# Patient Record
Sex: MALE | Race: WHITE | NOT HISPANIC OR LATINO
[De-identification: names, ages, dates, MRNs, and addresses within clinical notes are randomized per-mention and may not be internally consistent; named-entity substitution may affect disease eponyms.]

---

## 2017-01-13 ENCOUNTER — APPOINTMENT (OUTPATIENT)
Dept: INTERNAL MEDICINE | Facility: CLINIC | Age: 82
End: 2017-01-13

## 2017-03-07 ENCOUNTER — APPOINTMENT (OUTPATIENT)
Dept: INTERNAL MEDICINE | Facility: CLINIC | Age: 82
End: 2017-03-07

## 2017-04-12 ENCOUNTER — APPOINTMENT (OUTPATIENT)
Dept: DERMATOLOGY | Facility: CLINIC | Age: 82
End: 2017-04-12

## 2017-04-14 ENCOUNTER — APPOINTMENT (OUTPATIENT)
Dept: INTERNAL MEDICINE | Facility: CLINIC | Age: 82
End: 2017-04-14

## 2017-07-03 ENCOUNTER — APPOINTMENT (OUTPATIENT)
Dept: DERMATOLOGY | Facility: CLINIC | Age: 82
End: 2017-07-03

## 2017-07-12 ENCOUNTER — APPOINTMENT (OUTPATIENT)
Dept: INTERNAL MEDICINE | Facility: CLINIC | Age: 82
End: 2017-07-12

## 2017-09-15 ENCOUNTER — APPOINTMENT (OUTPATIENT)
Dept: DERMATOLOGY | Facility: CLINIC | Age: 82
End: 2017-09-15
Payer: MEDICARE

## 2017-09-15 PROCEDURE — 99213 OFFICE O/P EST LOW 20 MIN: CPT

## 2017-10-02 ENCOUNTER — APPOINTMENT (OUTPATIENT)
Dept: INTERNAL MEDICINE | Facility: CLINIC | Age: 82
End: 2017-10-02
Payer: MEDICARE

## 2017-10-02 PROCEDURE — 90686 IIV4 VACC NO PRSV 0.5 ML IM: CPT

## 2017-10-02 PROCEDURE — G0008: CPT

## 2017-10-02 PROCEDURE — 99214 OFFICE O/P EST MOD 30 MIN: CPT | Mod: 25

## 2018-01-02 ENCOUNTER — APPOINTMENT (OUTPATIENT)
Dept: INTERNAL MEDICINE | Facility: CLINIC | Age: 83
End: 2018-01-02
Payer: MEDICARE

## 2018-01-02 PROCEDURE — 99214 OFFICE O/P EST MOD 30 MIN: CPT

## 2018-03-19 ENCOUNTER — APPOINTMENT (OUTPATIENT)
Dept: DERMATOLOGY | Facility: CLINIC | Age: 83
End: 2018-03-19
Payer: MEDICARE

## 2018-03-19 PROCEDURE — 99214 OFFICE O/P EST MOD 30 MIN: CPT

## 2018-04-13 ENCOUNTER — APPOINTMENT (OUTPATIENT)
Dept: DERMATOLOGY | Facility: CLINIC | Age: 83
End: 2018-04-13
Payer: MEDICARE

## 2018-04-13 PROCEDURE — 99212 OFFICE O/P EST SF 10 MIN: CPT

## 2018-05-02 ENCOUNTER — APPOINTMENT (OUTPATIENT)
Dept: INTERNAL MEDICINE | Facility: CLINIC | Age: 83
End: 2018-05-02
Payer: MEDICARE

## 2018-05-02 PROCEDURE — 99214 OFFICE O/P EST MOD 30 MIN: CPT

## 2018-06-12 ENCOUNTER — APPOINTMENT (OUTPATIENT)
Dept: INTERNAL MEDICINE | Facility: CLINIC | Age: 83
End: 2018-06-12
Payer: MEDICARE

## 2018-06-12 PROCEDURE — 99213 OFFICE O/P EST LOW 20 MIN: CPT

## 2018-10-03 ENCOUNTER — APPOINTMENT (OUTPATIENT)
Dept: DERMATOLOGY | Facility: CLINIC | Age: 83
End: 2018-10-03
Payer: MEDICARE

## 2018-10-03 PROCEDURE — 99213 OFFICE O/P EST LOW 20 MIN: CPT

## 2018-10-05 ENCOUNTER — OTHER (OUTPATIENT)
Age: 83
End: 2018-10-05

## 2018-10-17 ENCOUNTER — APPOINTMENT (OUTPATIENT)
Dept: INTERNAL MEDICINE | Facility: CLINIC | Age: 83
End: 2018-10-17
Payer: MEDICARE

## 2018-10-17 VITALS
DIASTOLIC BLOOD PRESSURE: 70 MMHG | HEIGHT: 66 IN | SYSTOLIC BLOOD PRESSURE: 140 MMHG | BODY MASS INDEX: 27.64 KG/M2 | WEIGHT: 172 LBS

## 2018-10-17 PROCEDURE — 99215 OFFICE O/P EST HI 40 MIN: CPT

## 2018-10-17 NOTE — PHYSICAL EXAM

## 2018-10-17 NOTE — HISTORY OF PRESENT ILLNESS
[FreeTextEntry1] : follow up on results [de-identified] : PT HERE WITH SON AND WIFE HAS RECENT ELEVATION IN PSA SEEN BY DR SOMMERS AND MRI WAS SOEN WITH CON[facility]EREN FOR MALIGNANCY \par HE HAS SEEN UROLOGY  HERE FOR FOLLOW UP

## 2018-10-17 NOTE — PLAN
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

## 2018-10-23 ENCOUNTER — APPOINTMENT (OUTPATIENT)
Dept: UROLOGY | Facility: CLINIC | Age: 83
End: 2018-10-23
Payer: MEDICARE

## 2018-10-23 DIAGNOSIS — Z85.46 PERSONAL HISTORY OF MALIGNANT NEOPLASM OF PROSTATE: ICD-10-CM

## 2018-10-23 PROCEDURE — 99204 OFFICE O/P NEW MOD 45 MIN: CPT

## 2018-10-24 PROBLEM — Z85.46 HISTORY OF MALIGNANT NEOPLASM OF PROSTATE: Status: RESOLVED | Noted: 2018-10-17 | Resolved: 2018-10-24

## 2019-01-11 ENCOUNTER — APPOINTMENT (OUTPATIENT)
Dept: INTERNAL MEDICINE | Facility: CLINIC | Age: 84
End: 2019-01-11
Payer: MEDICARE

## 2019-01-11 VITALS
WEIGHT: 166 LBS | BODY MASS INDEX: 26.68 KG/M2 | HEIGHT: 66 IN | SYSTOLIC BLOOD PRESSURE: 158 MMHG | HEART RATE: 59 BPM | DIASTOLIC BLOOD PRESSURE: 74 MMHG

## 2019-01-11 VITALS — SYSTOLIC BLOOD PRESSURE: 120 MMHG | DIASTOLIC BLOOD PRESSURE: 70 MMHG

## 2019-01-11 PROCEDURE — 99214 OFFICE O/P EST MOD 30 MIN: CPT

## 2019-01-11 NOTE — HISTORY OF PRESENT ILLNESS
[FreeTextEntry1] : follow up meds [de-identified] : PT HERE FOR FOLLOW UP  FEELS OK \par HAS SOME DYSPNEA ON EXERTION AT TIMES NO CHEST PAIN\par PT SAW DR KIP KULKARNI FOR SECOND OPINION ON ELEVATED PSA\par

## 2019-01-11 NOTE — PLAN
[FreeTextEntry1] : PT HERE FOR FOLLOW UP  FEELS OK \par HAS SOME DYSPNEA ON EXERTION AT TIMES NO CHEST PAIN\par WILL REFER OT Cardiology HE HAD SEEN DR HESS YEARS AOG\par IT MAY BE DECONDITIONING BUT WILL HAVE THEM EVALUATE \par PT SAW DR KIP KULKARNI FOR SECOND OPINION ON ELEVATED PSA\par NO INDICATION FOR FURTHER WORK UP \par WILL FOLLOW UP\par D/W PT AND SON IN ROOM

## 2019-04-05 ENCOUNTER — APPOINTMENT (OUTPATIENT)
Dept: DERMATOLOGY | Facility: CLINIC | Age: 84
End: 2019-04-05
Payer: MEDICARE

## 2019-04-05 PROCEDURE — 99214 OFFICE O/P EST MOD 30 MIN: CPT

## 2019-04-22 ENCOUNTER — APPOINTMENT (OUTPATIENT)
Dept: INTERNAL MEDICINE | Facility: CLINIC | Age: 84
End: 2019-04-22
Payer: MEDICARE

## 2019-04-22 VITALS
BODY MASS INDEX: 26.52 KG/M2 | WEIGHT: 165 LBS | HEIGHT: 66 IN | DIASTOLIC BLOOD PRESSURE: 75 MMHG | SYSTOLIC BLOOD PRESSURE: 130 MMHG

## 2019-04-22 DIAGNOSIS — L29.9 PRURITUS, UNSPECIFIED: ICD-10-CM

## 2019-04-22 PROCEDURE — 99215 OFFICE O/P EST HI 40 MIN: CPT

## 2019-04-22 NOTE — PHYSICAL EXAM

## 2019-04-22 NOTE — HISTORY OF PRESENT ILLNESS
[FreeTextEntry1] : F/U  on B/P & Other Medical Problems [de-identified] : PT HERE FOR FOLLOW UP  FEELS OK \par HAS SOME DYSPNEA ON EXERTION AT TIMES NO CHEST PAIN\par PT SEES DR SOMMERS FOR CRI OVERALL FEELS OK\par \par

## 2019-04-22 NOTE — PLAN
[FreeTextEntry1] : PT HERE FOR FOLLOW UP  FEELS OK \par HAS SOME DYSPNEA ON EXERTION AT TIMES NO CHEST PAIN\par SAW  Cardiology   DR HESS  STRESS TEST OK\par  P T WITH DJD  DIFFICULTY WALKING LONG DISTANCES \par SON IN ROOM WITH PT\par GAVE  RX TO CHECK LIPID LEVEL

## 2019-08-29 ENCOUNTER — APPOINTMENT (OUTPATIENT)
Dept: INTERNAL MEDICINE | Facility: CLINIC | Age: 84
End: 2019-08-29
Payer: MEDICARE

## 2019-08-29 VITALS
DIASTOLIC BLOOD PRESSURE: 70 MMHG | SYSTOLIC BLOOD PRESSURE: 130 MMHG | WEIGHT: 167 LBS | HEIGHT: 68 IN | BODY MASS INDEX: 25.31 KG/M2

## 2019-08-29 DIAGNOSIS — M79.605 PAIN IN LEFT LEG: ICD-10-CM

## 2019-08-29 PROCEDURE — 99214 OFFICE O/P EST MOD 30 MIN: CPT

## 2019-08-29 NOTE — PLAN
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

## 2019-08-29 NOTE — PHYSICAL EXAM
[No Acute Distress] : no acute distress [Well Nourished] : well nourished [Well Developed] : well developed [Well-Appearing] : well-appearing [Normal Sclera/Conjunctiva] : normal sclera/conjunctiva [PERRL] : pupils equal round and reactive to light [EOMI] : extraocular movements intact [Normal Outer Ear/Nose] : the outer ears and nose were normal in appearance [Normal Oropharynx] : the oropharynx was normal [No JVD] : no jugular venous distention [No Lymphadenopathy] : no lymphadenopathy [Supple] : supple [Thyroid Normal, No Nodules] : the thyroid was normal and there were no nodules present [No Respiratory Distress] : no respiratory distress  [Clear to Auscultation] : lungs were clear to auscultation bilaterally [No Accessory Muscle Use] : no accessory muscle use [Regular Rhythm] : with a regular rhythm [Normal Rate] : normal rate  [No Murmur] : no murmur heard [Normal S1, S2] : normal S1 and S2 [No Carotid Bruits] : no carotid bruits [No Abdominal Bruit] : a ~M bruit was not heard ~T in the abdomen [No Varicosities] : no varicosities [Pedal Pulses Present] : the pedal pulses are present [No Palpable Aorta] : no palpable aorta [No Edema] : there was no peripheral edema [No Extremity Clubbing/Cyanosis] : no extremity clubbing/cyanosis [Soft] : abdomen soft [Non-distended] : non-distended [Non Tender] : non-tender [No HSM] : no HSM [No Masses] : no abdominal mass palpated [Normal Bowel Sounds] : normal bowel sounds [Normal Posterior Cervical Nodes] : no posterior cervical lymphadenopathy [Normal Anterior Cervical Nodes] : no anterior cervical lymphadenopathy [No CVA Tenderness] : no CVA  tenderness [No Spinal Tenderness] : no spinal tenderness [No Joint Swelling] : no joint swelling [Grossly Normal Strength/Tone] : grossly normal strength/tone [No Rash] : no rash [Coordination Grossly Intact] : coordination grossly intact [No Focal Deficits] : no focal deficits [Normal Gait] : normal gait [Deep Tendon Reflexes (DTR)] : deep tendon reflexes were 2+ and symmetric [Normal Affect] : the affect was normal [Normal Insight/Judgement] : insight and judgment were intact

## 2019-08-29 NOTE — HISTORY OF PRESENT ILLNESS
[FreeTextEntry1] : Follow Up On Medical Problems [de-identified] : PT HERE FOR FOLLOW UP  FEELS OK \par HAS SOME DYSPNEA ON EXERTION AT TIMES NO CHEST PAIN\par PT SEES DR SOMMERS FOR CRI OVERALL FEELS OK\par LABS Done RECENTLY\par \par

## 2019-10-25 ENCOUNTER — APPOINTMENT (OUTPATIENT)
Dept: DERMATOLOGY | Facility: CLINIC | Age: 84
End: 2019-10-25
Payer: MEDICARE

## 2019-10-25 PROCEDURE — 17000 DESTRUCT PREMALG LESION: CPT

## 2019-10-25 PROCEDURE — 99214 OFFICE O/P EST MOD 30 MIN: CPT | Mod: 25

## 2019-11-15 ENCOUNTER — OTHER (OUTPATIENT)
Age: 84
End: 2019-11-15

## 2020-02-20 ENCOUNTER — APPOINTMENT (OUTPATIENT)
Dept: INTERNAL MEDICINE | Facility: CLINIC | Age: 85
End: 2020-02-20
Payer: MEDICARE

## 2020-02-20 VITALS
SYSTOLIC BLOOD PRESSURE: 130 MMHG | WEIGHT: 168.5 LBS | DIASTOLIC BLOOD PRESSURE: 80 MMHG | BODY MASS INDEX: 25.54 KG/M2 | HEIGHT: 68 IN

## 2020-02-20 PROCEDURE — 99214 OFFICE O/P EST MOD 30 MIN: CPT

## 2020-02-20 NOTE — PHYSICAL EXAM
[No Acute Distress] : no acute distress [Well Nourished] : well nourished [Well Developed] : well developed [Well-Appearing] : well-appearing [Normal Sclera/Conjunctiva] : normal sclera/conjunctiva [EOMI] : extraocular movements intact [PERRL] : pupils equal round and reactive to light [Normal Outer Ear/Nose] : the outer ears and nose were normal in appearance [Normal Oropharynx] : the oropharynx was normal [No JVD] : no jugular venous distention [No Lymphadenopathy] : no lymphadenopathy [Supple] : supple [Thyroid Normal, No Nodules] : the thyroid was normal and there were no nodules present [No Accessory Muscle Use] : no accessory muscle use [No Respiratory Distress] : no respiratory distress  [Clear to Auscultation] : lungs were clear to auscultation bilaterally [Normal Rate] : normal rate  [Regular Rhythm] : with a regular rhythm [Normal S1, S2] : normal S1 and S2 [No Murmur] : no murmur heard [No Carotid Bruits] : no carotid bruits [No Abdominal Bruit] : a ~M bruit was not heard ~T in the abdomen [No Varicosities] : no varicosities [Pedal Pulses Present] : the pedal pulses are present [No Edema] : there was no peripheral edema [No Palpable Aorta] : no palpable aorta [No Extremity Clubbing/Cyanosis] : no extremity clubbing/cyanosis [Soft] : abdomen soft [Non Tender] : non-tender [Non-distended] : non-distended [No Masses] : no abdominal mass palpated [Normal Bowel Sounds] : normal bowel sounds [No HSM] : no HSM [Normal Posterior Cervical Nodes] : no posterior cervical lymphadenopathy [Normal Anterior Cervical Nodes] : no anterior cervical lymphadenopathy [No CVA Tenderness] : no CVA  tenderness [No Spinal Tenderness] : no spinal tenderness [No Joint Swelling] : no joint swelling [Grossly Normal Strength/Tone] : grossly normal strength/tone [No Rash] : no rash [Coordination Grossly Intact] : coordination grossly intact [No Focal Deficits] : no focal deficits [Normal Gait] : normal gait [Deep Tendon Reflexes (DTR)] : deep tendon reflexes were 2+ and symmetric [Normal Affect] : the affect was normal [Normal Insight/Judgement] : insight and judgment were intact

## 2020-02-20 NOTE — HISTORY OF PRESENT ILLNESS
[FreeTextEntry1] : Follow Up on B/P  Chol  & Other  Medical Problems [de-identified] : PT HERE FOR FOLLOW UP  FEELS OK \par NO CP OR SOB\par PT SEES DR SOMMERS FOR CRI OVERALL FEELS OK\par LABS Done IN NOVMEBER\par \par

## 2020-02-20 NOTE — PLAN
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

## 2020-04-16 ENCOUNTER — TRANSCRIPTION ENCOUNTER (OUTPATIENT)
Age: 85
End: 2020-04-16

## 2020-08-10 ENCOUNTER — RX RENEWAL (OUTPATIENT)
Age: 85
End: 2020-08-10

## 2020-08-13 ENCOUNTER — APPOINTMENT (OUTPATIENT)
Dept: INTERNAL MEDICINE | Facility: CLINIC | Age: 85
End: 2020-08-13
Payer: MEDICARE

## 2020-08-13 VITALS
TEMPERATURE: 98.1 F | SYSTOLIC BLOOD PRESSURE: 150 MMHG | BODY MASS INDEX: 25.46 KG/M2 | HEIGHT: 68 IN | WEIGHT: 168 LBS | DIASTOLIC BLOOD PRESSURE: 70 MMHG

## 2020-08-13 VITALS — SYSTOLIC BLOOD PRESSURE: 138 MMHG | DIASTOLIC BLOOD PRESSURE: 80 MMHG

## 2020-08-13 DIAGNOSIS — L30.9 DERMATITIS, UNSPECIFIED: ICD-10-CM

## 2020-08-13 DIAGNOSIS — R97.20 ELEVATED PROSTATE, SPECIFIC ANTIGEN [PSA]: ICD-10-CM

## 2020-08-13 PROCEDURE — 99214 OFFICE O/P EST MOD 30 MIN: CPT

## 2020-08-13 NOTE — PLAN
[FreeTextEntry1] : PT HERE FOR FOLLOW UP  FEELS OK \par   P T WITH DJD  DIFFICULTY WALKING LONG DISTANCES  SCIATIC ISSUES \par  \par LABS DONE  BY RENAL  \par  NOW ON FLOMAX DAILY AN DNOTES SOME IMPROVEMENT\par  \par HAD LIPID AND A1C BEFORE REVIEWED WITH POT\par OVERALL STABLE \par HAS BEEN CAREFUL DURING PANDEMIC\par OVERALL STABLE

## 2020-08-13 NOTE — HISTORY OF PRESENT ILLNESS
[FreeTextEntry1] : check up [de-identified] : PT HERE FOR FOLLOW UP  FEELS OK \par NO CP OR SOB\par PT SEES DR SOMMERS FOR CRI OVERALL FEELS OK\par LABS Done IN FEB\par \par

## 2020-08-21 ENCOUNTER — APPOINTMENT (OUTPATIENT)
Dept: DERMATOLOGY | Facility: CLINIC | Age: 85
End: 2020-08-21
Payer: MEDICARE

## 2020-08-21 PROCEDURE — 99214 OFFICE O/P EST MOD 30 MIN: CPT

## 2020-09-18 DIAGNOSIS — Z23 ENCOUNTER FOR IMMUNIZATION: ICD-10-CM

## 2020-09-21 ENCOUNTER — APPOINTMENT (OUTPATIENT)
Dept: INTERNAL MEDICINE | Facility: CLINIC | Age: 85
End: 2020-09-21

## 2020-09-22 ENCOUNTER — APPOINTMENT (OUTPATIENT)
Dept: DERMATOLOGY | Facility: CLINIC | Age: 85
End: 2020-09-22
Payer: MEDICARE

## 2020-09-22 PROCEDURE — 99213 OFFICE O/P EST LOW 20 MIN: CPT

## 2020-11-19 ENCOUNTER — APPOINTMENT (OUTPATIENT)
Dept: INTERNAL MEDICINE | Facility: CLINIC | Age: 85
End: 2020-11-19
Payer: MEDICARE

## 2020-11-19 VITALS
HEART RATE: 80 BPM | BODY MASS INDEX: 25.46 KG/M2 | TEMPERATURE: 97.4 F | WEIGHT: 168 LBS | HEIGHT: 68 IN | DIASTOLIC BLOOD PRESSURE: 76 MMHG | SYSTOLIC BLOOD PRESSURE: 153 MMHG | RESPIRATION RATE: 16 BRPM

## 2020-11-19 DIAGNOSIS — Z00.00 ENCOUNTER FOR GENERAL ADULT MEDICAL EXAMINATION W/OUT ABNORMAL FINDINGS: ICD-10-CM

## 2020-11-19 PROCEDURE — G0009: CPT

## 2020-11-19 PROCEDURE — 90732 PPSV23 VACC 2 YRS+ SUBQ/IM: CPT

## 2020-11-19 PROCEDURE — 36415 COLL VENOUS BLD VENIPUNCTURE: CPT

## 2020-11-19 PROCEDURE — 99214 OFFICE O/P EST MOD 30 MIN: CPT | Mod: 25

## 2020-11-19 NOTE — HISTORY OF PRESENT ILLNESS
[de-identified] : PT HERE FOR FOLLOW UP  FEELS OK \par NO CP OR SOB\par PT SEES DR SOMMERS FOR CRI OVERALL FEELS OK\par L \par \par

## 2020-11-19 NOTE — PLAN
[FreeTextEntry1] : PT HERE FOR FOLLOW UP  FEELS OK \par   P T WITH DJD  DIFFICULTY WALKING LONG DISTANCES  SCIATIC ISSUES \par  \par LABS DONE TODAY\par   BP IS ELEVATED WILL START LOW DOSE OF NORVASC 2.5MG AND IWLL FOLLOW UP HERE IN A MONTH FOR BP CHECK\par PNEUMOVAX 23 GIVEN

## 2020-11-22 LAB
ALBUMIN SERPL ELPH-MCNC: 4.5 G/DL
ALP BLD-CCNC: 64 U/L
ALT SERPL-CCNC: 26 U/L
ANION GAP SERPL CALC-SCNC: 14 MMOL/L
AST SERPL-CCNC: 22 U/L
BILIRUB SERPL-MCNC: 0.7 MG/DL
BUN SERPL-MCNC: 35 MG/DL
CALCIUM SERPL-MCNC: 10.2 MG/DL
CHLORIDE SERPL-SCNC: 106 MMOL/L
CHOLEST SERPL-MCNC: 158 MG/DL
CO2 SERPL-SCNC: 20 MMOL/L
CREAT SERPL-MCNC: 3.67 MG/DL
ESTIMATED AVERAGE GLUCOSE: 108 MG/DL
GLUCOSE SERPL-MCNC: 78 MG/DL
HBA1C MFR BLD HPLC: 5.4 %
HDLC SERPL-MCNC: 55 MG/DL
LDLC SERPL CALC-MCNC: 57 MG/DL
NONHDLC SERPL-MCNC: 104 MG/DL
POTASSIUM SERPL-SCNC: 4.1 MMOL/L
PROT SERPL-MCNC: 6.6 G/DL
SODIUM SERPL-SCNC: 140 MMOL/L
T4 SERPL-MCNC: 6.2 UG/DL
TRIGL SERPL-MCNC: 231 MG/DL
TSH SERPL-ACNC: 2.87 UIU/ML

## 2020-11-23 ENCOUNTER — NON-APPOINTMENT (OUTPATIENT)
Age: 85
End: 2020-11-23

## 2020-12-18 ENCOUNTER — APPOINTMENT (OUTPATIENT)
Dept: INTERNAL MEDICINE | Facility: CLINIC | Age: 85
End: 2020-12-18
Payer: MEDICARE

## 2020-12-18 VITALS
BODY MASS INDEX: 25.76 KG/M2 | HEIGHT: 68 IN | TEMPERATURE: 97.5 F | WEIGHT: 170 LBS | DIASTOLIC BLOOD PRESSURE: 75 MMHG | SYSTOLIC BLOOD PRESSURE: 130 MMHG

## 2020-12-18 PROCEDURE — 99214 OFFICE O/P EST MOD 30 MIN: CPT

## 2020-12-18 NOTE — HISTORY OF PRESENT ILLNESS
[Family Member] : family member [FreeTextEntry1] : FOLLOW UP ON BP [de-identified] : PT HERE FOR FOLLOW UP  FEELS OK \par NO CP OR SOB\par PT SEES DR SOMMERS FOR CRI OVERALL FEELS OK\par NORVASC ADDED TO BP LAST VISIT\par \par

## 2020-12-18 NOTE — HISTORY OF PRESENT ILLNESS
[Family Member] : family member [FreeTextEntry1] : FOLLOW UP ON BP [de-identified] : PT HERE FOR FOLLOW UP  FEELS OK \par NO CP OR SOB\par PT SEES DR SOMMERS FOR CRI OVERALL FEELS OK\par NORVASC ADDED TO BP LAST VISIT\par \par

## 2020-12-18 NOTE — PLAN
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

## 2021-01-13 ENCOUNTER — APPOINTMENT (OUTPATIENT)
Dept: INTERNAL MEDICINE | Facility: CLINIC | Age: 86
End: 2021-01-13
Payer: MEDICARE

## 2021-01-13 VITALS
WEIGHT: 170 LBS | TEMPERATURE: 97.6 F | DIASTOLIC BLOOD PRESSURE: 80 MMHG | BODY MASS INDEX: 25.76 KG/M2 | HEIGHT: 68 IN | SYSTOLIC BLOOD PRESSURE: 132 MMHG

## 2021-01-13 DIAGNOSIS — R60.9 EDEMA, UNSPECIFIED: ICD-10-CM

## 2021-01-13 PROCEDURE — 99214 OFFICE O/P EST MOD 30 MIN: CPT

## 2021-01-13 NOTE — PHYSICAL EXAM
[No Acute Distress] : no acute distress [Well Nourished] : well nourished [Well Developed] : well developed [Well-Appearing] : well-appearing [Normal Sclera/Conjunctiva] : normal sclera/conjunctiva [PERRL] : pupils equal round and reactive to light [EOMI] : extraocular movements intact [Normal Outer Ear/Nose] : the outer ears and nose were normal in appearance [Normal Oropharynx] : the oropharynx was normal [No JVD] : no jugular venous distention [No Lymphadenopathy] : no lymphadenopathy [Supple] : supple [Thyroid Normal, No Nodules] : the thyroid was normal and there were no nodules present [No Respiratory Distress] : no respiratory distress  [No Accessory Muscle Use] : no accessory muscle use [Clear to Auscultation] : lungs were clear to auscultation bilaterally [Normal Rate] : normal rate  [Regular Rhythm] : with a regular rhythm [Normal S1, S2] : normal S1 and S2 [No Murmur] : no murmur heard [No Carotid Bruits] : no carotid bruits [No Abdominal Bruit] : a ~M bruit was not heard ~T in the abdomen [No Varicosities] : no varicosities [Pedal Pulses Present] : the pedal pulses are present [No Palpable Aorta] : no palpable aorta [No Extremity Clubbing/Cyanosis] : no extremity clubbing/cyanosis [Soft] : abdomen soft [Non Tender] : non-tender [Non-distended] : non-distended [No Masses] : no abdominal mass palpated [No HSM] : no HSM [Normal Bowel Sounds] : normal bowel sounds [Normal Posterior Cervical Nodes] : no posterior cervical lymphadenopathy [Normal Anterior Cervical Nodes] : no anterior cervical lymphadenopathy [No CVA Tenderness] : no CVA  tenderness [No Spinal Tenderness] : no spinal tenderness [No Joint Swelling] : no joint swelling [Grossly Normal Strength/Tone] : grossly normal strength/tone [No Rash] : no rash [Coordination Grossly Intact] : coordination grossly intact [No Focal Deficits] : no focal deficits [Normal Gait] : normal gait [Deep Tendon Reflexes (DTR)] : deep tendon reflexes were 2+ and symmetric [Normal Affect] : the affect was normal [Normal Insight/Judgement] : insight and judgment were intact [de-identified] : bilateral pitting edema +2 [de-identified] : negative Homans sign

## 2021-01-13 NOTE — HISTORY OF PRESENT ILLNESS
[FreeTextEntry1] : bilateral leg swelling [de-identified] : Mr. MALENA STEIN is a 86 year male with a PMH of CKD, HTN comes to the office c.o Swelling of bilateral lower legs for the past 3-4 weeks. Patient states that he has been sitting for long periods of time at home, and has not walked "as much as he should" patient denies pain in legs, Patient denies fever, cough SOB. No other complaints at this time.

## 2021-01-13 NOTE — PLAN
[FreeTextEntry1] : patient's BP well controlled with current medication. will continue current  regimen \par \par Bilateral leg swelling likely secondary to dependent edema, patient was prescribed compression stockings and advised to elevated legs when sitting\par \par CKD- patient has follow up with his Nephrologist Dr. Mtz on 01/14/21\par \par Counseling included abnormal lab results, differential diagnoses, treatment options, risks and benefits, lifestyle changes, current condition, medications, and dose adjustments. \par The patient was interactive, attentive, asked questions, and verbalized understanding

## 2021-03-17 ENCOUNTER — APPOINTMENT (OUTPATIENT)
Dept: INTERNAL MEDICINE | Facility: CLINIC | Age: 86
End: 2021-03-17

## 2021-03-25 ENCOUNTER — APPOINTMENT (OUTPATIENT)
Dept: INTERNAL MEDICINE | Facility: CLINIC | Age: 86
End: 2021-03-25
Payer: MEDICARE

## 2021-03-25 VITALS
BODY MASS INDEX: 28.16 KG/M2 | SYSTOLIC BLOOD PRESSURE: 120 MMHG | HEIGHT: 65 IN | TEMPERATURE: 97.3 F | WEIGHT: 169 LBS | DIASTOLIC BLOOD PRESSURE: 73 MMHG

## 2021-03-25 DIAGNOSIS — R19.7 DIARRHEA, UNSPECIFIED: ICD-10-CM

## 2021-03-25 DIAGNOSIS — G47.33 OBSTRUCTIVE SLEEP APNEA (ADULT) (PEDIATRIC): ICD-10-CM

## 2021-03-25 PROCEDURE — 36415 COLL VENOUS BLD VENIPUNCTURE: CPT

## 2021-03-25 PROCEDURE — 99214 OFFICE O/P EST MOD 30 MIN: CPT | Mod: 25

## 2021-03-25 NOTE — PLAN
[FreeTextEntry1] : PT HERE FOR FOLLOW UP  FEELS OK \par NO CP OR SOB HAD ESPIODE EARLIER IN THE WEEK WITH INCREASED DIARRHEA DN FELT ILL IT HAS PASSSED AND FEELS BETTER POSSIBEL ETIOLOGY INCLUDES VIRAL OR FOOD BORNE ILLNESS\par PT SEES DR SOMMERS FOR CRI OVERALL FEELS OK DOES HAVE OCCASIONAL LEG CRAMPS\par CHECK LABS AND CHCEK MAGNSIUM \par NORVASC ADDED TO BP LAST VISIT AND TODAY\par BP OK\par OVERALL INMPROVED LABS DRAWN HERE\par \par \par

## 2021-03-25 NOTE — PHYSICAL EXAM
[Well Nourished] : well nourished [Well Developed] : well developed [Well-Appearing] : well-appearing [Normal Sclera/Conjunctiva] : normal sclera/conjunctiva [PERRL] : pupils equal round and reactive to light [EOMI] : extraocular movements intact [Normal Outer Ear/Nose] : the outer ears and nose were normal in appearance [Normal Oropharynx] : the oropharynx was normal [No JVD] : no jugular venous distention [No Lymphadenopathy] : no lymphadenopathy [Supple] : supple [Thyroid Normal, No Nodules] : the thyroid was normal and there were no nodules present [No Respiratory Distress] : no respiratory distress  [No Accessory Muscle Use] : no accessory muscle use [Clear to Auscultation] : lungs were clear to auscultation bilaterally [Normal Rate] : normal rate  [Regular Rhythm] : with a regular rhythm [Normal S1, S2] : normal S1 and S2 [No Murmur] : no murmur heard [No Carotid Bruits] : no carotid bruits [No Abdominal Bruit] : a ~M bruit was not heard ~T in the abdomen [No Varicosities] : no varicosities [Pedal Pulses Present] : the pedal pulses are present [No Edema] : there was no peripheral edema [No Palpable Aorta] : no palpable aorta [No Extremity Clubbing/Cyanosis] : no extremity clubbing/cyanosis [Soft] : abdomen soft [Non Tender] : non-tender [Non-distended] : non-distended [No Masses] : no abdominal mass palpated [No HSM] : no HSM [Normal Bowel Sounds] : normal bowel sounds [Normal Posterior Cervical Nodes] : no posterior cervical lymphadenopathy [Normal Anterior Cervical Nodes] : no anterior cervical lymphadenopathy [No CVA Tenderness] : no CVA  tenderness [No Spinal Tenderness] : no spinal tenderness [No Joint Swelling] : no joint swelling [Grossly Normal Strength/Tone] : grossly normal strength/tone [No Rash] : no rash [Coordination Grossly Intact] : coordination grossly intact [No Focal Deficits] : no focal deficits [Normal Gait] : normal gait [Normal Affect] : the affect was normal [Normal Insight/Judgement] : insight and judgment were intact [de-identified] : OSTOMY IN PLACE

## 2021-03-25 NOTE — HISTORY OF PRESENT ILLNESS
[Family Member] : family member [FreeTextEntry1] : FOLLOW UP ON BP [de-identified] : PT HERE FOR FOLLOW UP  FEELS OK \par NO CP OR SOB HAD ESPIODE EARLIER IN THE WEEK WITH INCREASED DIARRHEA DN FELT ILL IT HAS PASSSED AND FEELS BETTER\par PT SEES DR SOMMERS FOR CRI OVERALL FEELS OK DOES HAVE OCCSIAONAL LEG CRAMPS\par NORVASC ADDED TO BP LAST VISIT\par \par

## 2021-04-01 LAB
ALBUMIN SERPL ELPH-MCNC: 4.8 G/DL
ALP BLD-CCNC: 64 U/L
ALT SERPL-CCNC: 22 U/L
ANION GAP SERPL CALC-SCNC: 18 MMOL/L
AST SERPL-CCNC: 18 U/L
BILIRUB SERPL-MCNC: 1 MG/DL
BUN SERPL-MCNC: 62 MG/DL
CALCIUM SERPL-MCNC: 10.4 MG/DL
CHLORIDE SERPL-SCNC: 99 MMOL/L
CO2 SERPL-SCNC: 17 MMOL/L
CREAT SERPL-MCNC: 6.42 MG/DL
GLUCOSE SERPL-MCNC: 101 MG/DL
MAGNESIUM SERPL-MCNC: 2.2 MG/DL
POTASSIUM SERPL-SCNC: 4.2 MMOL/L
PROT SERPL-MCNC: 7.6 G/DL
SODIUM SERPL-SCNC: 135 MMOL/L

## 2021-04-30 ENCOUNTER — TRANSCRIPTION ENCOUNTER (OUTPATIENT)
Age: 86
End: 2021-04-30

## 2021-06-03 ENCOUNTER — APPOINTMENT (OUTPATIENT)
Dept: DERMATOLOGY | Facility: CLINIC | Age: 86
End: 2021-06-03
Payer: MEDICARE

## 2021-06-03 ENCOUNTER — RESULT REVIEW (OUTPATIENT)
Age: 86
End: 2021-06-03

## 2021-06-03 PROCEDURE — 99212 OFFICE O/P EST SF 10 MIN: CPT | Mod: 25

## 2021-06-03 PROCEDURE — 17281 DSTR MAL LS F/E/E/N/L/M .6-1: CPT

## 2021-07-01 ENCOUNTER — APPOINTMENT (OUTPATIENT)
Dept: INTERNAL MEDICINE | Facility: CLINIC | Age: 86
End: 2021-07-01
Payer: MEDICARE

## 2021-07-01 VITALS
HEIGHT: 65 IN | SYSTOLIC BLOOD PRESSURE: 140 MMHG | TEMPERATURE: 97.5 F | BODY MASS INDEX: 28.82 KG/M2 | WEIGHT: 173 LBS | DIASTOLIC BLOOD PRESSURE: 80 MMHG

## 2021-07-01 DIAGNOSIS — R25.2 CRAMP AND SPASM: ICD-10-CM

## 2021-07-01 PROCEDURE — 99214 OFFICE O/P EST MOD 30 MIN: CPT

## 2021-07-01 NOTE — PHYSICAL EXAM
[Well Nourished] : well nourished [Well Developed] : well developed [Well-Appearing] : well-appearing [Normal Sclera/Conjunctiva] : normal sclera/conjunctiva [PERRL] : pupils equal round and reactive to light [EOMI] : extraocular movements intact [Normal Outer Ear/Nose] : the outer ears and nose were normal in appearance [Normal Oropharynx] : the oropharynx was normal [No JVD] : no jugular venous distention [No Lymphadenopathy] : no lymphadenopathy [Supple] : supple [Thyroid Normal, No Nodules] : the thyroid was normal and there were no nodules present [No Respiratory Distress] : no respiratory distress  [No Accessory Muscle Use] : no accessory muscle use [Clear to Auscultation] : lungs were clear to auscultation bilaterally [Normal Rate] : normal rate  [Regular Rhythm] : with a regular rhythm [Normal S1, S2] : normal S1 and S2 [No Murmur] : no murmur heard [No Carotid Bruits] : no carotid bruits [No Abdominal Bruit] : a ~M bruit was not heard ~T in the abdomen [No Varicosities] : no varicosities [Pedal Pulses Present] : the pedal pulses are present [No Edema] : there was no peripheral edema [No Palpable Aorta] : no palpable aorta [No Extremity Clubbing/Cyanosis] : no extremity clubbing/cyanosis [Soft] : abdomen soft [Non Tender] : non-tender [Non-distended] : non-distended [No Masses] : no abdominal mass palpated [Normal Bowel Sounds] : normal bowel sounds [No HSM] : no HSM [Normal Posterior Cervical Nodes] : no posterior cervical lymphadenopathy [Normal Anterior Cervical Nodes] : no anterior cervical lymphadenopathy [No CVA Tenderness] : no CVA  tenderness [No Spinal Tenderness] : no spinal tenderness [No Joint Swelling] : no joint swelling [Grossly Normal Strength/Tone] : grossly normal strength/tone [No Rash] : no rash [Coordination Grossly Intact] : coordination grossly intact [No Focal Deficits] : no focal deficits [Normal Gait] : normal gait [Normal Affect] : the affect was normal [Normal Insight/Judgement] : insight and judgment were intact [de-identified] : OSTOMY IN PLACE

## 2021-07-01 NOTE — PLAN
[FreeTextEntry1] : PT HERE FOR FOLLOW UP  FEELS OK \par NO CP OR SOB  \par PT SEES DR SOMMERS FOR CRI OVERALL FEELS OK DOES HAVE OCCASIONAL LEG CRAMPS\par  \par NORVASC FOR  BP LAST VISIT AND TODAY\par BP OK\par OVERALL NEEDS TO SEE DR SOMMERS\par \par \par

## 2021-07-01 NOTE — HISTORY OF PRESENT ILLNESS
[Family Member] : family member [FreeTextEntry1] : FOLLOW UP ON BP [de-identified] : PT HERE FOR FOLLOW UP  FEELS OK  HAS SEEN DR SOMMERS FROM [facility] OVERALL FEELS OK NO MAJOR COMPLAINTS \par NO CP OR SOB HS LEG CRAMPS IS TRYING TO DRINK MORE WATER\par

## 2021-07-04 ENCOUNTER — RX RENEWAL (OUTPATIENT)
Age: 86
End: 2021-07-04

## 2021-09-09 ENCOUNTER — APPOINTMENT (OUTPATIENT)
Dept: DERMATOLOGY | Facility: CLINIC | Age: 86
End: 2021-09-09
Payer: MEDICARE

## 2021-09-09 PROCEDURE — 99213 OFFICE O/P EST LOW 20 MIN: CPT

## 2021-10-21 ENCOUNTER — APPOINTMENT (OUTPATIENT)
Dept: INTERNAL MEDICINE | Facility: CLINIC | Age: 86
End: 2021-10-21
Payer: MEDICARE

## 2021-10-21 VITALS
BODY MASS INDEX: 28.66 KG/M2 | SYSTOLIC BLOOD PRESSURE: 120 MMHG | HEIGHT: 65 IN | WEIGHT: 172 LBS | DIASTOLIC BLOOD PRESSURE: 60 MMHG

## 2021-10-21 DIAGNOSIS — M25.551 PAIN IN RIGHT HIP: ICD-10-CM

## 2021-10-21 DIAGNOSIS — C61 MALIGNANT NEOPLASM OF PROSTATE: ICD-10-CM

## 2021-10-21 DIAGNOSIS — C79.51 MALIGNANT NEOPLASM OF PROSTATE: ICD-10-CM

## 2021-10-21 PROCEDURE — 99215 OFFICE O/P EST HI 40 MIN: CPT

## 2021-10-21 NOTE — PLAN
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

## 2021-10-21 NOTE — HISTORY OF PRESENT ILLNESS
[Family Member] : family member [FreeTextEntry1] : FOLLOW UP ON BP [de-identified] : PT HERE FOR FOLLOW UP  FEELS OK  HAS SEEN DR SOMMERS FROM [facility] OVERALL FEELS OK NO MAJOR COMPLAINTS \par NO CP OR SOB   HAS RIGHT HIP PAIN  AND HAS BEEN LIMPING AS PER SON \par

## 2021-10-21 NOTE — PHYSICAL EXAM
[Well Nourished] : well nourished [Well Developed] : well developed [Well-Appearing] : well-appearing [Normal Sclera/Conjunctiva] : normal sclera/conjunctiva [PERRL] : pupils equal round and reactive to light [EOMI] : extraocular movements intact [Normal Outer Ear/Nose] : the outer ears and nose were normal in appearance [Normal Oropharynx] : the oropharynx was normal [No JVD] : no jugular venous distention [No Lymphadenopathy] : no lymphadenopathy [Supple] : supple [Thyroid Normal, No Nodules] : the thyroid was normal and there were no nodules present [No Respiratory Distress] : no respiratory distress  [No Accessory Muscle Use] : no accessory muscle use [Clear to Auscultation] : lungs were clear to auscultation bilaterally [Normal Rate] : normal rate  [Regular Rhythm] : with a regular rhythm [Normal S1, S2] : normal S1 and S2 [No Murmur] : no murmur heard [No Carotid Bruits] : no carotid bruits [No Abdominal Bruit] : a ~M bruit was not heard ~T in the abdomen [No Varicosities] : no varicosities [Pedal Pulses Present] : the pedal pulses are present [No Edema] : there was no peripheral edema [No Palpable Aorta] : no palpable aorta [No Extremity Clubbing/Cyanosis] : no extremity clubbing/cyanosis [Soft] : abdomen soft [Non Tender] : non-tender [Non-distended] : non-distended [No Masses] : no abdominal mass palpated [No HSM] : no HSM [Normal Bowel Sounds] : normal bowel sounds [Normal Posterior Cervical Nodes] : no posterior cervical lymphadenopathy [Normal Anterior Cervical Nodes] : no anterior cervical lymphadenopathy [No CVA Tenderness] : no CVA  tenderness [No Spinal Tenderness] : no spinal tenderness [No Rash] : no rash [Coordination Grossly Intact] : coordination grossly intact [No Focal Deficits] : no focal deficits [Normal Gait] : normal gait [Normal Affect] : the affect was normal [Normal Insight/Judgement] : insight and judgment were intact [de-identified] : OSTOMY IN PLACE [de-identified] : RIGHT HIP PAIN

## 2021-11-08 ENCOUNTER — APPOINTMENT (OUTPATIENT)
Dept: INTERNAL MEDICINE | Facility: CLINIC | Age: 86
End: 2021-11-08
Payer: MEDICARE

## 2021-11-08 ENCOUNTER — MED ADMIN CHARGE (OUTPATIENT)
Age: 86
End: 2021-11-08

## 2021-11-08 PROCEDURE — 90471 IMMUNIZATION ADMIN: CPT

## 2021-11-08 PROCEDURE — 90715 TDAP VACCINE 7 YRS/> IM: CPT | Mod: GY

## 2021-12-02 ENCOUNTER — APPOINTMENT (OUTPATIENT)
Dept: PULMONOLOGY | Facility: CLINIC | Age: 86
End: 2021-12-02
Payer: MEDICARE

## 2021-12-02 ENCOUNTER — NON-APPOINTMENT (OUTPATIENT)
Age: 86
End: 2021-12-02

## 2021-12-02 VITALS
SYSTOLIC BLOOD PRESSURE: 128 MMHG | WEIGHT: 173 LBS | OXYGEN SATURATION: 98 % | BODY MASS INDEX: 28.79 KG/M2 | HEART RATE: 65 BPM | RESPIRATION RATE: 16 BRPM | DIASTOLIC BLOOD PRESSURE: 69 MMHG

## 2021-12-02 DIAGNOSIS — Z13.9 ENCOUNTER FOR SCREENING, UNSPECIFIED: ICD-10-CM

## 2021-12-02 DIAGNOSIS — R91.1 SOLITARY PULMONARY NODULE: ICD-10-CM

## 2021-12-02 DIAGNOSIS — J98.6 DISORDERS OF DIAPHRAGM: ICD-10-CM

## 2021-12-02 PROCEDURE — 99204 OFFICE O/P NEW MOD 45 MIN: CPT

## 2021-12-02 RX ORDER — SODIUM CITRATE DIHYDRATE 100 %
POWDER (GRAM) MISCELLANEOUS
Refills: 0 | Status: ACTIVE | COMMUNITY

## 2021-12-02 RX ORDER — SODIUM BICARBONATE 650 MG/1
650 TABLET ORAL
Refills: 0 | Status: ACTIVE | COMMUNITY

## 2021-12-02 NOTE — HISTORY OF PRESENT ILLNESS
[Former] : former [TextBox_4] : 87M PMH HTN, HLD, CKD4, VLADIMIR (untreated) who presents for initial pulmonary evaluation of cough and SOB for 2 months. He has morning coughs, he has post nasal drip. He saw a cardiologist earlier this year (Dr. Flora Hood), had a nuclear stress test a few years ago, he reports there were 'no blockages.' No fevers, no chills. No sweats. No headaches. No LOC. No chest pain or palpitations. He reports getting fatigued easily and winded with exertion, and this abates with rest. \par \par Notably pt had recent CXR at [facility] 8/27/21 showing elevated right diaphragm and possible acute airspace disease at the left lung base. CXR previous to that on 7/28/21 showed right lower lobe infiltrate.  [TextBox_11] : 1 [TextBox_13] : 10 [YearQuit] : 1963

## 2021-12-02 NOTE — PROCEDURE
[FreeTextEntry1] : R\par 	\par Exam requested by:\par MIGRATED REFERRING MD\par [address]\par [facility] [address]\par SITE PERFORMED: [facility]\par SITE PHONE: [phone] \par Patient: CELESTINE STEIN\par YOB: 1934\par Phone: \par MRN: [id number] Acc: [id number]\par Date of Exam: 2016\par  \par \par EXAM:  Chest radiograph\par \par CLINICAL HISTORY:  Cough and congestion\par \par TECHNIQUE:\par PA and lateral\par \par COMPARISON:\par None.\par \par FINDINGS:\par The heart and mediastinum are normal. Minimal infiltrate or atelectasis is noted at the right lung base. Elevation of the right hemidiaphragm is noted as well. The remaining lung fields are clear.  Kyphosis of the thoracic spine is noted. \par \par IMPRESSION:\par Elevation of the right hemidiaphragm with minimal infiltrate or atelectasis at the right lung base.  Radiographic follow-up in 2 weeks time is suggested.\par Thank you for the opportunity to participate in the care of this patient.  \par  \par HAKEEM CHRIS MD  - Electronically Signed: 2016 1:13 PM \par \par ~~~~~~~~~~~~~~~~~~~~~~~~~~~~~~~~~~~~~~~~~~~~~~~~~~~~~~~~~~~~~~~~~~~~~~~~\par \par \par DEVIN\par PATIENT NAME: Celestine Stein\par PATIENT PHONE NUMBER:\[facility] PATIENT ID: [id number]\par : 1934\par DATE OF EXAM: 2006\par R. Phys. Name: Felice Constatnino\par R. Phys. Address: [address] [place] [address]\par R. Phys. Phone: [phone]\par \par CT scan of the chest was performed with volume acquisition obtained from the pulmonary apices to the costophrenic sulci. Intravenous contrast was not administered.\par \par Comparison is made to selected images from and the report of the previous study of .\par \par This study is limited due to variation in patient breathing during the examination.\par \par The tiny nodular densities previously described appear unchanged.\par \par Areas of subsegmental atelectasis versus scarring at the right lung base appear stable. No new nodules are evident. There is a lobulation of the medial aspect of the left hemidiaphragm which appears unchanged. There is no pleural or pericardial effusion. No gross hilar or mediastinal adenopathy is evident. There is no axillary adenopathy.\par \par The bone windows show no destructive lytic or blastic change.\par \par Impression\par \par Limited study due to breathing respiratory variation.\par \par Nodular densities and subsegmental atelectasis in both lungs appear unchanged dating back to . An additional short interval follow up study is suggested.\par \par \par \par \par SIGNED BY: Romero Venegas M.D., Ext. [id number] 2006 12:00 AM\par \par \par ~~~~~~~~~~~~~~~~~~~~~~~~~~~~~~~~~~~~~~~~~~~~~~~~~~~~~~~~~~~~~~~~~~~~~~~~\par \par BRAYDONMAYACLIF\par PATIENT NAME: Celestine Stein\par PATIENT PHONE NUMBER:\[facility] PATIENT ID: [id number]\par : 1934\par DATE OF EXAM: 2005\par R. Phys. Name: Zocassandra Greg\par R. Phys. Address: [address] [place] [address], [facility]\par R. Phys. Phone: [phone]\par \par CT scan of the chest was performed with volume acquisition obtained from the pulmonary apices to the costophrenic sulci. Intravenous contrast was not administered.\par \par This is a follow-up to the patients abdominal CT scan performed on 05.\par \par There are no pathologically enlarged mediastinal or hilar lymph nodes on this noncontrast examination. The heart size is not enlarged. There is no pericardial fluid. There is a subcentimeter right cardiophrenic angle lymph node (image 42). There are no pleural effusions.\par \par There is dependent atelectatic change along the posterior aspect of both lower lobes. There is prominence to the interstitial markings in the left lower lobe. There is a 0.5 cm nodule in the left lower lobe in the superior segment which abuts the fissure measuring 0.5 cm (image 31). There is a nodule measuring 0.2 cm at the left lung base posteriorly (image 45). There is linear atelectasis versus scarring in the right middle lobe. There is question of a tiny nodule at the junction of the right middle lobe and right upper lobe (image 31). There are no endobronchial abnormalities.\par \par In the upper abdomen the adrenal glands are noted. The gallbladder is absent. There are degenerative changes of the spine.\par \par IMPRESSION:\par \par \par There is dependent atelectatic change along the posterior aspect of both lower lobes with prominent interstitial markings particularly at the left lung base.\par \par There is a 0.5 cm nodule in the left lower lobe which abuts the fissure measuring 0.5 cm. There is a question of a tiny nodule at the junction of the right upper and right middle lobe along with linear atelectasis versus scarring in the right middle lobe. Short interval follow-up CT scan in 4-6 months is recommended to assess for stability.\par \par Status post cholecystectomy.\par \par \par \par \par SIGNED BY: Jackeline Osullivan M.D., Ext. [id number] 2005 12:00 AM\par \par ~~~~~~~~~~~~~~~~~~~~~~~~~~~~~~~~~~~~~~~~~~~~~~~~~~~~~~~~~~~~~~~~~~~~~~~~\par \par

## 2021-12-07 ENCOUNTER — APPOINTMENT (OUTPATIENT)
Dept: ORTHOPEDIC SURGERY | Facility: CLINIC | Age: 86
End: 2021-12-07
Payer: MEDICARE

## 2021-12-07 VITALS
SYSTOLIC BLOOD PRESSURE: 164 MMHG | BODY MASS INDEX: 28.82 KG/M2 | DIASTOLIC BLOOD PRESSURE: 76 MMHG | HEART RATE: 58 BPM | WEIGHT: 173 LBS | HEIGHT: 65 IN

## 2021-12-07 PROCEDURE — 73502 X-RAY EXAM HIP UNI 2-3 VIEWS: CPT | Mod: RT

## 2021-12-07 PROCEDURE — 99204 OFFICE O/P NEW MOD 45 MIN: CPT

## 2021-12-07 NOTE — HISTORY OF PRESENT ILLNESS
[de-identified] : Patient is an 87-year-old male here today for evaluation of right hip pain that is been going on for the past 3 to 4 months.  Patient states that he has pain radiating into his right groin that hurts more when he rises from a seated position or is trying to put on shoes.  After he is walking for a short period of time the pain started in the knee improves.  He is unable to take NSAIDs due to chronic kidney disease as well as GI issues.  He takes Tylenol as needed for the pain.  Has not tried therapy or injections.  Denies any history of trauma to the hip.  Denies any neurovascular compromise in the leg or back pain.

## 2021-12-07 NOTE — DISCUSSION/SUMMARY
[Medication Risks Reviewed] : Medication risks reviewed [Surgical risks reviewed] : Surgical risks reviewed [de-identified] : Patient is an 87-year-old male with right hip osteoarthritis presenting today for initial evaluation management.  I do not think he is a surgical candidate due to his history of chronic kidney disease as well as ileostomy and multiple GI issues.  We did discuss conservative treatment at this time.  I recommended physical therapy for his right hip.  Have also recommended a right hip ultrasound-guided cortisone injection and given him a prescription for that.  He is going to follow-up with me on an as-needed basis for his right hip.  All questions were asked and answered with his son.

## 2021-12-07 NOTE — PHYSICAL EXAM
[de-identified] : GENERAL APPEARANCE: Well nourished and hydrated, pleasant, alert, and oriented x 3. Appears their stated age. \par HEENT: Normocephalic, extraocular eye motion intact. Nasal septum midline. Oral cavity clear. External auditory canal clear. \par RESPIRATORY: Breath sounds clear and audible in all lobes. No wheezing, No accessory muscle use.\par CARDIOVASCULAR: No apparent abnormalities. No lower leg edema. No varicosities. Pedal pulses are palpable.\par NEUROLOGIC: Sensation is normal, no muscle weakness in the upper or lower extremities.\par DERMATOLOGIC: No apparent skin lesions, moist, warm, no rash.\par SPINE: Cervical spine appears normal and moves freely; thoracic spine appears normal and moves freely; lumbosacral spine appears normal and moves freely, normal, nontender.\par MUSCULOSKELETAL: Hands, wrists, and elbows are normal and move freely, shoulders are normal and move freely. \par Psychiatric: Oriented to person, place, and time, insight and judgement were intact and the affect was normal. \par Musculoskeletal: ambulates with a limp. Right hip exam showed positive groin pain with SLR, ROM is limited to internal and external rotation with pain in the, NOREEN negative, FADIR negative.  No significant leg length inequality noted.\par 5/5 motor strength in bilateral lower extremities. Sensory: Intact in bilateral lower extremities. DTRs: Biceps, brachioradialis, triceps, patellar, ankle and plantar 2+ and symmetric bilaterally. Pulses: dorsalis pedis, posterior tibial, femoral, popliteal, and radial 2+ and symmetric bilaterally. \par  [de-identified] : AP pelvis and 2 views of the right hip obtained in the office today show no acute fracture or dislocation.  There is severe joint space narrowing subchondral cyst and osteophyte formation in the bilateral hips right worse than left.

## 2021-12-15 ENCOUNTER — NON-APPOINTMENT (OUTPATIENT)
Age: 86
End: 2021-12-15

## 2022-01-19 ENCOUNTER — APPOINTMENT (OUTPATIENT)
Dept: INTERNAL MEDICINE | Facility: CLINIC | Age: 87
End: 2022-01-19
Payer: MEDICARE

## 2022-01-19 VITALS — DIASTOLIC BLOOD PRESSURE: 80 MMHG | SYSTOLIC BLOOD PRESSURE: 140 MMHG

## 2022-01-19 DIAGNOSIS — S40.022A CONTUSION OF LEFT UPPER ARM, INITIAL ENCOUNTER: ICD-10-CM

## 2022-01-19 DIAGNOSIS — E87.5 HYPERKALEMIA: ICD-10-CM

## 2022-01-19 PROCEDURE — 99215 OFFICE O/P EST HI 40 MIN: CPT | Mod: 25

## 2022-01-19 PROCEDURE — 36415 COLL VENOUS BLD VENIPUNCTURE: CPT

## 2022-01-19 NOTE — HISTORY OF PRESENT ILLNESS
[Family Member] : family member [FreeTextEntry1] : FOLLOW UP ON BP [de-identified] : PT HERE FOR FOLLOW UP  FEELS OK  HAS SEEN DR SOMMERS FROM RENAL OVERALL FEELS [place] NO MAJOR COMPLAINTS \par NO CP OR SOB   HAS RIGHT HIP PAIN  AND HAS BEEN LIMPING AS PER SON  PT HAD ELEVATED WHEN BLOODS WERE DRAWN IN DEC\par PT ALSO WITH TRAUMA TO LEFT ARM \par

## 2022-01-19 NOTE — PHYSICAL EXAM
[Well Nourished] : well nourished [Well Developed] : well developed [Well-Appearing] : well-appearing [Normal Sclera/Conjunctiva] : normal sclera/conjunctiva [PERRL] : pupils equal round and reactive to light [EOMI] : extraocular movements intact [Normal Outer Ear/Nose] : the outer ears and nose were normal in appearance [Normal Oropharynx] : the oropharynx was normal [No JVD] : no jugular venous distention [No Lymphadenopathy] : no lymphadenopathy [Supple] : supple [Thyroid Normal, No Nodules] : the thyroid was normal and there were no nodules present [No Respiratory Distress] : no respiratory distress  [No Accessory Muscle Use] : no accessory muscle use [Clear to Auscultation] : lungs were clear to auscultation bilaterally [Normal Rate] : normal rate  [Regular Rhythm] : with a regular rhythm [Normal S1, S2] : normal S1 and S2 [No Murmur] : no murmur heard [No Carotid Bruits] : no carotid bruits [No Abdominal Bruit] : a ~M bruit was not heard ~T in the abdomen [No Varicosities] : no varicosities [Pedal Pulses Present] : the pedal pulses are present [No Edema] : there was no peripheral edema [No Palpable Aorta] : no palpable aorta [No Extremity Clubbing/Cyanosis] : no extremity clubbing/cyanosis [Soft] : abdomen soft [Non Tender] : non-tender [Non-distended] : non-distended [No Masses] : no abdominal mass palpated [No HSM] : no HSM [Normal Bowel Sounds] : normal bowel sounds [Normal Posterior Cervical Nodes] : no posterior cervical lymphadenopathy [Normal Anterior Cervical Nodes] : no anterior cervical lymphadenopathy [No CVA Tenderness] : no CVA  tenderness [No Spinal Tenderness] : no spinal tenderness [No Rash] : no rash [Coordination Grossly Intact] : coordination grossly intact [No Focal Deficits] : no focal deficits [Normal Gait] : normal gait [Normal Affect] : the affect was normal [Normal Insight/Judgement] : insight and judgment were intact [de-identified] : OSTOMY IN PLACE [de-identified] : RIGHT HIP PAIN  [de-identified] : LEFT UPPER EXTREMITUY ECCHMOSOIS  pROMINENT BICEP

## 2022-01-19 NOTE — PLAN
[FreeTextEntry1] : PT HERE FOR FOLLOW UP  FEELS OK  HAS SEEN DR SOMMERS FROM RENAL [facility]ALL [facility]EL[facility] [place] NO MAJOR COMPLAINTS HAD BLLOD WORK FORM RENAL WITH ELEVATED K 5.5 IN DEC WILL REPEAT BP TODAY \par NO CP OR SOB   HAS RIGHT HIP PAIN  AND HAS BEEN LIMPING AS PER SON \par PT WITH TRAUMA TO LEFT ARM AT [employer] AND HAS RESOLVING HEMAOTMA IT APPERAS BICEPS IS PROMINENT  ? 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

## 2022-01-20 LAB
ANION GAP SERPL CALC-SCNC: 17 MMOL/L
BUN SERPL-MCNC: 35 MG/DL
CALCIUM SERPL-MCNC: 10.4 MG/DL
CHLORIDE SERPL-SCNC: 109 MMOL/L
CO2 SERPL-SCNC: 16 MMOL/L
CREAT SERPL-MCNC: 4.49 MG/DL
GLUCOSE SERPL-MCNC: 88 MG/DL
POTASSIUM SERPL-SCNC: 5.1 MMOL/L
SODIUM SERPL-SCNC: 142 MMOL/L

## 2022-01-21 ENCOUNTER — NON-APPOINTMENT (OUTPATIENT)
Age: 87
End: 2022-01-21

## 2022-02-03 ENCOUNTER — APPOINTMENT (OUTPATIENT)
Dept: PULMONOLOGY | Facility: CLINIC | Age: 87
End: 2022-02-03
Payer: MEDICARE

## 2022-02-03 VITALS — BODY MASS INDEX: 28.49 KG/M2 | WEIGHT: 171 LBS | HEIGHT: 65 IN

## 2022-02-03 VITALS
SYSTOLIC BLOOD PRESSURE: 128 MMHG | HEART RATE: 62 BPM | RESPIRATION RATE: 16 BRPM | DIASTOLIC BLOOD PRESSURE: 68 MMHG | OXYGEN SATURATION: 98 %

## 2022-02-03 PROCEDURE — 99214 OFFICE O/P EST MOD 30 MIN: CPT

## 2022-02-03 NOTE — PROCEDURE
[FreeTextEntry1] : R\par 	\par Exam requested by:\par MIGRATED REFERRING MD\par [address]\par [facility] [address]\par SITE PERFORMED: [facility] DATA\par SITE PHONE: [phone] \par Patient: CELESTINE STEIN\par YOB: 1934\par Phone: \par MRN: [id number] Acc: [id number]\par Date of Exam: 2016\par  \par \par EXAM: Chest radiograph\par \par CLINICAL HISTORY: Cough and congestion\par \par TECHNIQUE:\par PA and lateral\par \par COMPARISON:\par None.\par \par FINDINGS:\par The heart and mediastinum are normal. Minimal infiltrate or atelectasis is noted at the right lung base. Elevation of the right hemidiaphragm is noted as well. The remaining lung fields are clear. Kyphosis of the thoracic spine is noted. \par \par IMPRESSION:\par Elevation of the right hemidiaphragm with minimal infiltrate or atelectasis at the right lung base. Radiographic follow-up in 2 weeks time is suggested.\par Thank you for the opportunity to participate in the care of this patient. \par  \par HAKEEM CHRIS MD - Electronically Signed: 2016 1:13 PM \par \par ~~~~~~~~~~~~~~~~~~~~~~~~~~~~~~~~~~~~~~~~~~~~~~~~~~~~~~~~~~~~~~~~~~~~~~~~\par \par \par DEVIN\par PATIENT NAME: Celestine Stein\par PATIENT PHONE NUMBER:\[facility] PATIENT ID: [id number]\par : 1934\par DATE OF EXAM: 2006\par R. Phys. Name: Felice Constantino\par R. Phys. Address: [address] [place] [address]\par R. Phys. Phone: [phone]\par \par CT scan of the chest was performed with volume acquisition obtained from the pulmonary apices to the costophrenic sulci. Intravenous contrast was not administered.\par \par Comparison is made to selected images from and the report of the previous study of .\par \par This study is limited due to variation in patient breathing during the examination.\par \par The tiny nodular densities previously described appear unchanged.\par \par Areas of subsegmental atelectasis versus scarring at the right lung base appear stable. No new nodules are evident. There is a lobulation of the medial aspect of the left hemidiaphragm which appears unchanged. There is no pleural or pericardial effusion. No gross hilar or mediastinal adenopathy is evident. There is no axillary adenopathy.\par \par The bone windows show no destructive lytic or blastic change.\par \par Impression\par \par Limited study due to breathing respiratory variation.\par \par Nodular densities and subsegmental atelectasis in both lungs appear unchanged dating back to . An additional short interval follow up study is suggested.\par \par \par \par \par SIGNED BY: Romero Venegas M.D., Ext. [id number] 2006 12:00 AM\par \par \par ~~~~~~~~~~~~~~~~~~~~~~~~~~~~~~~~~~~~~~~~~~~~~~~~~~~~~~~~~~~~~~~~~~~~~~~~\par \par BRAYDONMAYACLIF\par PATIENT NAME: Celestine Stein\par PATIENT PHONE NUMBER:\[facility] PATIENT ID: [id number]\par : 1934\par DATE OF EXAM: 2005\par R. Phys. Name: Zocassandra Greg\par R. Phys. Address: [address] [place] [address], [facility]\par R. Phys. Phone: [phone]\par \par CT scan of the chest was performed with volume acquisition obtained from the pulmonary apices to the costophrenic sulci. Intravenous contrast was not administered.\par \par This is a follow-up to the patients abdominal CT scan performed on 05.\par \par There are no pathologically enlarged mediastinal or hilar lymph nodes on this noncontrast examination. The heart size is not enlarged. There is no pericardial fluid. There is a subcentimeter right cardiophrenic angle lymph node (image 42). There are no pleural effusions.\par \par There is dependent atelectatic change along the posterior aspect of both lower lobes. There is prominence to the interstitial markings in the left lower lobe. There is a 0.5 cm nodule in the left lower lobe in the superior segment which abuts the fissure measuring 0.5 cm (image 31). There is a nodule measuring 0.2 cm at the left lung base posteriorly (image 45). There is linear atelectasis versus scarring in the right middle lobe. There is question of a tiny nodule at the junction of the right middle lobe and right upper lobe (image 31). There are no endobronchial abnormalities.\par \par In the upper abdomen the adrenal glands are noted. The gallbladder is absent. There are degenerative changes of the spine.\par \par IMPRESSION:\par \par \par There is dependent atelectatic change along the posterior aspect of both lower lobes with prominent interstitial markings particularly at the left lung base.\par \par There is a 0.5 cm nodule in the left lower lobe which abuts the fissure measuring 0.5 cm. There is a question of a tiny nodule at the junction of the right upper and right middle lobe along with linear atelectasis versus scarring in the right middle lobe. Short interval follow-up CT scan in 4-6 months is recommended to assess for stability.\par \par Status post cholecystectomy.\par \par \par \par \par SIGNED BY: Jackeline Osullivan M.D., Ext. [id number] 12:00 AM\par \par ~~~~~~~~~~~~~~~~~~~~~~~~~~~~~~~~~~~~~~~~~~~~~~~~~~~~~~~~~~~~~~~~~~~~~~~~\par

## 2022-02-03 NOTE — HISTORY OF PRESENT ILLNESS
[Former] : former [TextBox_4] : 87M PMH HTN, HLD, CKD4, VLADIMIR (untreated) who presents for f/u cough/SOB. Had CXR 07/2021 showing RLL PNA, and 08/2021 showing LLL PNA. Had CT chest 12/9/2021 showing 3mm nodules in LLL and mild fibrotic changes. Also had a fluoroscopic sniff test (given hemidiaphragm elevation) that was negative bilaterally. He reports SOB with mild-moderate exertion but resolves with rest. No productive cough. No fevers, no chills. No N/V/D.  [TextBox_11] : 1 [TextBox_13] : 10 [YearQuit] : 1963

## 2022-02-16 LAB — SARS-COV-2 N GENE NPH QL NAA+PROBE: NOT DETECTED

## 2022-02-17 ENCOUNTER — APPOINTMENT (OUTPATIENT)
Dept: PULMONOLOGY | Facility: CLINIC | Age: 87
End: 2022-02-17
Payer: MEDICARE

## 2022-02-17 VITALS — HEIGHT: 62 IN | WEIGHT: 173 LBS | BODY MASS INDEX: 31.83 KG/M2

## 2022-02-17 PROCEDURE — 94727 GAS DIL/WSHOT DETER LNG VOL: CPT

## 2022-02-17 PROCEDURE — 85018 HEMOGLOBIN: CPT | Mod: QW

## 2022-02-17 PROCEDURE — 94729 DIFFUSING CAPACITY: CPT

## 2022-02-17 PROCEDURE — 94010 BREATHING CAPACITY TEST: CPT

## 2022-03-31 ENCOUNTER — NON-APPOINTMENT (OUTPATIENT)
Age: 87
End: 2022-03-31

## 2022-04-10 ENCOUNTER — RX RENEWAL (OUTPATIENT)
Age: 87
End: 2022-04-10

## 2022-04-11 ENCOUNTER — RX CHANGE (OUTPATIENT)
Age: 87
End: 2022-04-11

## 2022-04-11 RX ORDER — ALBUTEROL SULFATE 90 UG/1
108 (90 BASE) INHALANT RESPIRATORY (INHALATION)
Qty: 3 | Refills: 3 | Status: DISCONTINUED | COMMUNITY
Start: 2021-12-02 | End: 2022-04-11

## 2022-04-14 ENCOUNTER — APPOINTMENT (OUTPATIENT)
Dept: ORTHOPEDIC SURGERY | Facility: CLINIC | Age: 87
End: 2022-04-14
Payer: MEDICARE

## 2022-04-14 VITALS
DIASTOLIC BLOOD PRESSURE: 71 MMHG | HEIGHT: 62 IN | HEART RATE: 61 BPM | BODY MASS INDEX: 31.83 KG/M2 | WEIGHT: 173 LBS | SYSTOLIC BLOOD PRESSURE: 134 MMHG

## 2022-04-14 PROCEDURE — 99213 OFFICE O/P EST LOW 20 MIN: CPT

## 2022-04-14 NOTE — HISTORY OF PRESENT ILLNESS
[de-identified] : 89 yo m c/o right hip pain.  Patient reports that he had cortisone injection to right hip about 4 months ago.  He reports that he had relief of pain for about 2 months, but the pain has been increasing again over the last 2 months.  He has had f/u with card, pulm and renal doctors.  States that the pain is better when he is walking.  At worst it is an 8 out of 10 however does not reach that often.  Has been following up with his pulmonologist as well as his renal doctor.  He requests another cortisone injection.

## 2022-04-14 NOTE — PHYSICAL EXAM
[de-identified] : Musculoskeletal: ambulates with a limp. Right hip exam showed positive groin pain with SLR, ROM is limited to internal and external rotation 30 degrees of external and 10 degrees of internal with pain NOREEN negative, FADIR negative.  There is approximately half centimeter leg length discrepancy with the right leg being shorter..\par 5/5 motor strength in bilateral lower extremities. Sensory: Intact in bilateral lower extremities. DTRs: Biceps, brachioradialis, triceps, patellar, ankle and plantar 2+ and symmetric bilaterally. Pulses: dorsalis pedis, posterior tibial, femoral, popliteal, and radial 2+ and symmetric bilaterally. \par

## 2022-04-14 NOTE — DISCUSSION/SUMMARY
[Medication Risks Reviewed] : Medication risks reviewed [Surgical risks reviewed] : Surgical risks reviewed [de-identified] : Patient is an 88-year-old male with severe right hip osteoarthritis seen today for follow-up.  He does have significant pulmonary renal and GI issues that he is being treated for.  He had good relief of the pain with a cortisone injection his last visit that allowed him to go out and do his yard work go about his daily business and have minimal pain in his hip.  He would like to try another cortisone injection today due to the fact that he had good response to it at his last visit I think that is indicated.  I given prescription for an ultrasound-guided cortisone injection.  I have also again recommended physical therapy.  He should perform at home exercise and strengthening instructions without were given.  All questions were asked and answered.  I will see him back on an as-needed basis for his right hip.  He was advised to be a candidate for total hip in the future.  However he would need to be cleared by his physicians prior to any surgical intervention.

## 2022-05-04 ENCOUNTER — APPOINTMENT (OUTPATIENT)
Dept: INTERNAL MEDICINE | Facility: CLINIC | Age: 87
End: 2022-05-04
Payer: MEDICARE

## 2022-05-04 VITALS
HEIGHT: 62 IN | DIASTOLIC BLOOD PRESSURE: 72 MMHG | WEIGHT: 173 LBS | HEART RATE: 64 BPM | BODY MASS INDEX: 31.83 KG/M2 | SYSTOLIC BLOOD PRESSURE: 141 MMHG

## 2022-05-04 DIAGNOSIS — K21.9 GASTRO-ESOPHAGEAL REFLUX DISEASE W/OUT ESOPHAGITIS: ICD-10-CM

## 2022-05-04 PROCEDURE — 99214 OFFICE O/P EST MOD 30 MIN: CPT

## 2022-05-04 RX ORDER — ESOMEPRAZOLE MAGNESIUM 40 MG/1
40 CAPSULE, DELAYED RELEASE ORAL DAILY
Qty: 90 | Refills: 2 | Status: ACTIVE | COMMUNITY
Start: 2022-05-04 | End: 1900-01-01

## 2022-05-04 NOTE — PLAN
[FreeTextEntry1] : PT HERE FOR FOLLOW UP  FEELS OK  HAS SEEN DR SOMMERS FROM RENAL [facility]ALL FEEL[facility] [place] NO MAJOR COMPLAINTS \par NO CP OR SOB   HAS RIGHT HIP PAIN   HAS SEEN DR FLORENCE JASMINE HAD INJECTION OVERALL FEELS OK ALOS HAS SEEN DR URENA OF PULM  USES INHALER\par COMPLAINS TODAY OF INTERMITTENT HICCUPS ADN WITH GERD\par IN TERMS OF HICCUPS D/W OSN THORAZINE BUT TOO MANY ISDE EFFECT POTENTIALS\par PT REPORTS HE FEELS IT DEPENDS ON WHAT HE EATS\par ALSO WITH GERD WILLRX NEXIUM\par WILL FOLLOWUP ANSWERED QUESTIONS\par

## 2022-05-04 NOTE — HISTORY OF PRESENT ILLNESS
[Family Member] : family member [FreeTextEntry1] : FOLLOW UP ON BP [de-identified] : PT HERE FOR FOLLOW UP  FEELS OK  HAS SEEN DR SOMMERS FROM RENAL OVERALL FEELS [place] NO MAJOR COMPLAINTS \par NO CP OR SOB   HAS RIGHT HIP PAIN   HAS SEEN DR FLORENCE JASMINE HAD INJECTION OVERALL FEELS OK ALOS HAS SEEN DR URENA OF PULM  USES INHALER\par COMPLAINS TODAY OF INTERMITTENT HICCUPS ADN WITH GERD

## 2022-05-04 NOTE — PHYSICAL EXAM
[Well Nourished] : well nourished [Well Developed] : well developed [Well-Appearing] : well-appearing [Normal Sclera/Conjunctiva] : normal sclera/conjunctiva [PERRL] : pupils equal round and reactive to light [EOMI] : extraocular movements intact [Normal Outer Ear/Nose] : the outer ears and nose were normal in appearance [Normal Oropharynx] : the oropharynx was normal [No JVD] : no jugular venous distention [No Lymphadenopathy] : no lymphadenopathy [Supple] : supple [Thyroid Normal, No Nodules] : the thyroid was normal and there were no nodules present [No Respiratory Distress] : no respiratory distress  [No Accessory Muscle Use] : no accessory muscle use [Clear to Auscultation] : lungs were clear to auscultation bilaterally [Normal Rate] : normal rate  [Regular Rhythm] : with a regular rhythm [Normal S1, S2] : normal S1 and S2 [No Murmur] : no murmur heard [No Carotid Bruits] : no carotid bruits [No Abdominal Bruit] : a ~M bruit was not heard ~T in the abdomen [No Varicosities] : no varicosities [Pedal Pulses Present] : the pedal pulses are present [No Edema] : there was no peripheral edema [No Palpable Aorta] : no palpable aorta [No Extremity Clubbing/Cyanosis] : no extremity clubbing/cyanosis [Soft] : abdomen soft [Non Tender] : non-tender [Non-distended] : non-distended [No Masses] : no abdominal mass palpated [No HSM] : no HSM [Normal Bowel Sounds] : normal bowel sounds [Normal Posterior Cervical Nodes] : no posterior cervical lymphadenopathy [Normal Anterior Cervical Nodes] : no anterior cervical lymphadenopathy [No CVA Tenderness] : no CVA  tenderness [No Spinal Tenderness] : no spinal tenderness [No Rash] : no rash [Coordination Grossly Intact] : coordination grossly intact [No Focal Deficits] : no focal deficits [Normal Gait] : normal gait [Normal Affect] : the affect was normal [Normal Insight/Judgement] : insight and judgment were intact [de-identified] : OSTOMY IN PLACE [de-identified] : RIGHT HIP PAIN  [de-identified] : LEFT UPPER EXTREMITUY ECCHMOSOIS  pROMINENT BICEP

## 2022-05-11 ENCOUNTER — APPOINTMENT (OUTPATIENT)
Dept: PULMONOLOGY | Facility: CLINIC | Age: 87
End: 2022-05-11
Payer: MEDICARE

## 2022-05-11 VITALS
SYSTOLIC BLOOD PRESSURE: 130 MMHG | DIASTOLIC BLOOD PRESSURE: 70 MMHG | WEIGHT: 172 LBS | BODY MASS INDEX: 31.65 KG/M2 | HEIGHT: 62 IN | HEART RATE: 61 BPM | OXYGEN SATURATION: 97 % | RESPIRATION RATE: 16 BRPM

## 2022-05-11 PROCEDURE — 99214 OFFICE O/P EST MOD 30 MIN: CPT

## 2022-05-11 RX ORDER — NYSTATIN 100000 1/G
100000 POWDER TOPICAL
Qty: 1 | Refills: 1 | Status: DISCONTINUED | COMMUNITY
Start: 2020-02-20 | End: 2022-05-11

## 2022-05-11 RX ORDER — BETAMETHASONE DIPROPIONATE 0.5 MG/G
0.05 CREAM TOPICAL TWICE DAILY
Qty: 1 | Refills: 1 | Status: DISCONTINUED | COMMUNITY
Start: 2019-01-11 | End: 2022-05-11

## 2022-05-11 NOTE — PROCEDURE
[FreeTextEntry1] : R\par 	\par Exam requested by:\par MIGRATED REFERRING MD\par [address]\par [facility] [address]\par SITE PERFORMED: [facility]IG[facility] DATA\par SITE PHONE: [phone] \par Patient: CELESTINE STEIN\par YOB: 1934\par Phone: \par MRN: [id number] Acc: [id number]\par Date of Exam: 2016\par  \par \par EXAM: Chest radiograph\par \par CLINICAL HISTORY: Cough and congestion\par \par TECHNIQUE:\par PA and lateral\par \par COMPARISON:\par None.\par \par FINDINGS:\par The heart and mediastinum are normal. Minimal infiltrate or atelectasis is noted at the right lung base. Elevation of the right hemidiaphragm is noted as well. The remaining lung fields are clear. Kyphosis of the thoracic spine is noted. \par \par IMPRESSION:\par Elevation of the right hemidiaphragm with minimal infiltrate or atelectasis at the right lung base. Radiographic follow-up in 2 weeks time is suggested.\par Thank you for the opportunity to participate in the care of this patient. \par  \par HAKEEM CHRIS MD - Electronically Signed: 2016 1:13 PM \par \par ~~~~~~~~~~~~~~~~~~~~~~~~~~~~~~~~~~~~~~~~~~~~~~~~~~~~~~~~~~~~~~~~~~~~~~~~\par \par \par DEVIN\par PATIENT NAME: Celestine Stein\par PATIENT PHONE NUMBER:\[facility] PATIENT ID: [id number]\par : 1934\par DATE OF EXAM: 2006\par R. Phys. Name: Felice Constantino\par R. Phys. Address: [address] [place] [address]\par R. Phys. Phone: [phone]\par \par CT scan of the chest was performed with volume acquisition obtained from the pulmonary apices to the costophrenic sulci. Intravenous contrast was not administered.\par \par Comparison is made to selected images from and the report of the previous study of .\par \par This study is limited due to variation in patient breathing during the examination.\par \par The tiny nodular densities previously described appear unchanged.\par \par Areas of subsegmental atelectasis versus scarring at the right lung base appear stable. No new nodules are evident. There is a lobulation of the medial aspect of the left hemidiaphragm which appears unchanged. There is no pleural or pericardial effusion. No gross hilar or mediastinal adenopathy is evident. There is no axillary adenopathy.\par \par The bone windows show no destructive lytic or blastic change.\par \par Impression\par \par Limited study due to breathing respiratory variation.\par \par Nodular densities and subsegmental atelectasis in both lungs appear unchanged dating back to . An additional short interval follow up study is suggested.\par \par \par \par \par SIGNED BY: Romero Venegas M.D., Ext. [id number] 2006 12:00 AM\par \par \par ~~~~~~~~~~~~~~~~~~~~~~~~~~~~~~~~~~~~~~~~~~~~~~~~~~~~~~~~~~~~~~~~~~~~~~~~\par \par BRAYDONMAYACLIF\par PATIENT NAME: Celestine Stein\par PATIENT PHONE NUMBER:\[facility] PATIENT ID: [id number]\par : 1934\par DATE OF EXAM: 2005\par R. Phys. Name: Zocassandra Greg\par R. Phys. Address: [address] [place] [address], [facility]\par R. Phys. Phone: [phone]\par \par CT scan of the chest was performed with volume acquisition obtained from the pulmonary apices to the costophrenic sulci. Intravenous contrast was not administered.\par \par This is a follow-up to the patients abdominal CT scan performed on 05.\par \par There are no pathologically enlarged mediastinal or hilar lymph nodes on this noncontrast examination. The heart size is not enlarged. There is no pericardial fluid. There is a subcentimeter right cardiophrenic angle lymph node (image 42). There are no pleural effusions.\par \par There is dependent atelectatic change along the posterior aspect of both lower lobes. There is prominence to the interstitial markings in the left lower lobe. There is a 0.5 cm nodule in the left lower lobe in the superior segment which abuts the fissure measuring 0.5 cm (image 31). There is a nodule measuring 0.2 cm at the left lung base posteriorly (image 45). There is linear atelectasis versus scarring in the right middle lobe. There is question of a tiny nodule at the junction of the right middle lobe and right upper lobe (image 31). There are no endobronchial abnormalities.\par \par In the upper abdomen the adrenal glands are noted. The gallbladder is absent. There are degenerative changes of the spine.\par \par IMPRESSION:\par \par \par There is dependent atelectatic change along the posterior aspect of both lower lobes with prominent interstitial markings particularly at the left lung base.\par \par There is a 0.5 cm nodule in the left lower lobe which abuts the fissure measuring 0.5 cm. There is a question of a tiny nodule at the junction of the right upper and right middle lobe along with linear atelectasis versus scarring in the right middle lobe. Short interval follow-up CT scan in 4-6 months is recommended to assess for stability.\par \par Status post cholecystectomy.\par \par \par \par \par SIGNED BY: Jackeline Osullivan M.D., Ext. [id number] 2005 12:00 AM\par \par ~~~~~~~~~~~~~~~~~~~~~~~~~~~~~~~~~~~~~~~~~~~~~~~~~~~~~~~~~~~~~~~~~~~~~~~~\par

## 2022-05-11 NOTE — HISTORY OF PRESENT ILLNESS
[Former] : former [TextBox_4] : 88M PMH mild COPD, HTN, HLD, CKD4, VLADIMIR (untreated) who presents for f/u cough/SOB. Had CXR 07/2021 showing RLL PNA, and 08/2021 showing LLL PNA. Had CT chest 12/9/2021 showing 3mm nodules in LLL and mild fibrotic changes. Also had a fluoroscopic sniff test (given hemidiaphragm elevation) that was negative bilaterally. PFT 02/2022 showed mild obstruction with low DLCO, consistent COPD. Patient is feeling slightly better while using albuterol, which he has been doing about once per day. No fevers, no chills, no hospitalizations recently.  [TextBox_11] : 1 [TextBox_13] : 10 [YearQuit] : 1963

## 2022-06-15 ENCOUNTER — APPOINTMENT (OUTPATIENT)
Dept: DERMATOLOGY | Facility: CLINIC | Age: 87
End: 2022-06-15
Payer: MEDICARE

## 2022-06-15 PROCEDURE — 17000 DESTRUCT PREMALG LESION: CPT

## 2022-06-15 PROCEDURE — 99213 OFFICE O/P EST LOW 20 MIN: CPT | Mod: 25

## 2022-08-02 ENCOUNTER — APPOINTMENT (OUTPATIENT)
Dept: ORTHOPEDIC SURGERY | Facility: CLINIC | Age: 87
End: 2022-08-02

## 2022-08-02 VITALS
SYSTOLIC BLOOD PRESSURE: 148 MMHG | BODY MASS INDEX: 31.65 KG/M2 | HEIGHT: 62 IN | WEIGHT: 172 LBS | HEART RATE: 60 BPM | DIASTOLIC BLOOD PRESSURE: 75 MMHG

## 2022-08-02 PROCEDURE — 99213 OFFICE O/P EST LOW 20 MIN: CPT

## 2022-08-02 PROCEDURE — 73502 X-RAY EXAM HIP UNI 2-3 VIEWS: CPT

## 2022-08-02 NOTE — DISCUSSION/SUMMARY
[Medication Risks Reviewed] : Medication risks reviewed [Surgical risks reviewed] : Surgical risks reviewed [de-identified] : Patient is an 88-year-old male with severe right hip osteoarthritis presenting today for follow-up.  He is hesitant to proceed with any type of surgical intervention due to his medical comorbidities.  He would like to try another cortisone injection.  I have given him prescription for that.  I have also recommended physical therapy again given him prescription for that.  He should use Voltaren gel for his hip as well as Tylenol as needed.  We again thoroughly discussed a total hip arthroplasty.  He would like to exhaust all conservative treatment prior to proceeding with this.  All questions were asked and answered.  I will see him back on an as-needed basis for his right hip.

## 2022-08-02 NOTE — HISTORY OF PRESENT ILLNESS
[de-identified] : Patient is an 88-year-old male here today for follow-up of his right hip pain.  He had a cortisone injection his hip in December that he states helped his pain immensely.  Had another one in April that moderate relieved his pain.  States the hip is starting to hurt him more.  States is chronic kidney disease as well as COPD has been stable since his last visit.  Has difficulty with navigating stairs and rising reseated position.  Has pain over the lateral aspect of the hip that radiates into the groin.  Is here today for follow-up

## 2022-08-02 NOTE — PHYSICAL EXAM
[de-identified] : Musculoskeletal: ambulates with a limp. Right hip exam showed positive groin pain with SLR, ROM is limited to internal and external rotation 30 degrees of external and 10 degrees of internal with pain NOREEN negative, FADIR negative.  There is approximately half centimeter leg length discrepancy with the right leg being shorter..\par 5/5 motor strength in bilateral lower extremities. Sensory: Intact in bilateral lower extremities. DTRs: Biceps, brachioradialis, triceps, patellar, ankle and plantar 2+ and symmetric bilaterally. Pulses: dorsalis pedis, posterior tibial, femoral, popliteal, and radial 2+ and symmetric bilaterally. \par  [de-identified] : AP pelvis and 2 views of the right hip obtained the office today show no acute fracture or dislocation.  There is progression of his right hip arthritis with subchondral collapse bone-on-bone osteoarthritis consistent with severe right hip osteoarthritis.

## 2022-08-10 ENCOUNTER — APPOINTMENT (OUTPATIENT)
Dept: PULMONOLOGY | Facility: CLINIC | Age: 87
End: 2022-08-10

## 2022-08-10 VITALS
SYSTOLIC BLOOD PRESSURE: 138 MMHG | BODY MASS INDEX: 31.47 KG/M2 | DIASTOLIC BLOOD PRESSURE: 78 MMHG | OXYGEN SATURATION: 98 % | HEIGHT: 62 IN | RESPIRATION RATE: 16 BRPM | HEART RATE: 72 BPM | WEIGHT: 171 LBS

## 2022-08-10 DIAGNOSIS — R06.02 SHORTNESS OF BREATH: ICD-10-CM

## 2022-08-10 DIAGNOSIS — R09.89 OTHER SPECIFIED SYMPTOMS AND SIGNS INVOLVING THE CIRCULATORY AND RESPIRATORY SYSTEMS: ICD-10-CM

## 2022-08-10 PROCEDURE — 99214 OFFICE O/P EST MOD 30 MIN: CPT

## 2022-08-10 NOTE — HISTORY OF PRESENT ILLNESS
[Former] : former [TextBox_4] : 88M PMH mild COPD, HTN, HLD, CKD4, VLADIMIR (untreated) who presents for f/u cough/SOB, COPD. PFT 02/2022 showed FEV1 1.84L (132.5%). Last visit was started on a trial of Anoro ellipta; pt reports not feeling a difference, however his son reports his shortness of breath episodes are "less dramatic" and so he thinks he is better overall. No recent hospital stays. No phlegm production. No chest pain or tightness. No N/V/D.  [TextBox_11] : 1 [TextBox_13] : 10 [YearQuit] : 1963

## 2022-08-11 ENCOUNTER — APPOINTMENT (OUTPATIENT)
Dept: PULMONOLOGY | Facility: CLINIC | Age: 87
End: 2022-08-11

## 2022-08-11 ENCOUNTER — APPOINTMENT (OUTPATIENT)
Dept: INTERNAL MEDICINE | Facility: CLINIC | Age: 87
End: 2022-08-11

## 2022-08-11 VITALS
HEART RATE: 74 BPM | OXYGEN SATURATION: 98 % | BODY MASS INDEX: 31.65 KG/M2 | DIASTOLIC BLOOD PRESSURE: 80 MMHG | WEIGHT: 172 LBS | SYSTOLIC BLOOD PRESSURE: 138 MMHG | HEIGHT: 62 IN

## 2022-08-11 PROCEDURE — 99214 OFFICE O/P EST MOD 30 MIN: CPT

## 2022-08-11 NOTE — PHYSICAL EXAM
[Well Nourished] : well nourished [Well Developed] : well developed [Well-Appearing] : well-appearing [Normal Sclera/Conjunctiva] : normal sclera/conjunctiva [PERRL] : pupils equal round and reactive to light [EOMI] : extraocular movements intact [Normal Outer Ear/Nose] : the outer ears and nose were normal in appearance [Normal Oropharynx] : the oropharynx was normal [No JVD] : no jugular venous distention [No Lymphadenopathy] : no lymphadenopathy [Supple] : supple [Thyroid Normal, No Nodules] : the thyroid was normal and there were no nodules present [No Respiratory Distress] : no respiratory distress  [No Accessory Muscle Use] : no accessory muscle use [Clear to Auscultation] : lungs were clear to auscultation bilaterally [Normal Rate] : normal rate  [Regular Rhythm] : with a regular rhythm [Normal S1, S2] : normal S1 and S2 [No Murmur] : no murmur heard [No Carotid Bruits] : no carotid bruits [No Abdominal Bruit] : a ~M bruit was not heard ~T in the abdomen [No Varicosities] : no varicosities [Pedal Pulses Present] : the pedal pulses are present [No Edema] : there was no peripheral edema [No Palpable Aorta] : no palpable aorta [No Extremity Clubbing/Cyanosis] : no extremity clubbing/cyanosis [Soft] : abdomen soft [Non Tender] : non-tender [Non-distended] : non-distended [No Masses] : no abdominal mass palpated [No HSM] : no HSM [Normal Bowel Sounds] : normal bowel sounds [Normal Posterior Cervical Nodes] : no posterior cervical lymphadenopathy [Normal Anterior Cervical Nodes] : no anterior cervical lymphadenopathy [No CVA Tenderness] : no CVA  tenderness [No Spinal Tenderness] : no spinal tenderness [No Rash] : no rash [Coordination Grossly Intact] : coordination grossly intact [No Focal Deficits] : no focal deficits [Normal Gait] : normal gait [Normal Affect] : the affect was normal [Normal Insight/Judgement] : insight and judgment were intact [de-identified] : OSTOMY IN PLACE [de-identified] : RIGHT HIP PAIN  [de-identified] : LEFT UPPER EXTREMITUY ECCHMOSOIS  pROMINENT BICEP

## 2022-08-11 NOTE — PLAN
[FreeTextEntry1] : 87YO MALE PT HERE FOR FOLLOW UP  FEELS OK  HAS SEEN DR SOMMERS FROM RENAL OVERALL FEELS OK NO MAJOR COMPLAINTS \par NO CP OR SOB   HAS RIGHT HIP PAIN   HAS SEEN DR FLORENCE JASMINE HAD INJECTION OVERALL FEELS OK ALOS HAS SEEN DR URENA OF PULM  USES INHALER\par   OVERALL DOING WELL HAD LABS  ORDERED BY DR URENA WILL FOLLOUWP \par ANSWERED QUESTIONS

## 2022-10-26 ENCOUNTER — APPOINTMENT (OUTPATIENT)
Dept: ORTHOPEDIC SURGERY | Facility: CLINIC | Age: 87
End: 2022-10-26

## 2022-11-17 ENCOUNTER — APPOINTMENT (OUTPATIENT)
Dept: PULMONOLOGY | Facility: CLINIC | Age: 87
End: 2022-11-17
Payer: MEDICARE

## 2022-11-17 VITALS — OXYGEN SATURATION: 98 % | RESPIRATION RATE: 16 BRPM | HEART RATE: 60 BPM

## 2022-11-17 VITALS — BODY MASS INDEX: 31.09 KG/M2 | WEIGHT: 170 LBS

## 2022-11-17 VITALS — DIASTOLIC BLOOD PRESSURE: 60 MMHG | SYSTOLIC BLOOD PRESSURE: 115 MMHG

## 2022-11-17 PROCEDURE — 99214 OFFICE O/P EST MOD 30 MIN: CPT

## 2022-11-17 RX ORDER — DICLOFENAC SODIUM 1% 10 MG/G
1 GEL TOPICAL
Qty: 300 | Refills: 0 | Status: ACTIVE | COMMUNITY
Start: 2022-10-11

## 2022-11-17 RX ORDER — OXICONAZOLE NITRATE 10 MG/G
1 CREAM TOPICAL
Qty: 180 | Refills: 0 | Status: ACTIVE | COMMUNITY
Start: 2022-09-14

## 2022-11-17 RX ORDER — SODIUM CITRATE AND CITRIC ACID 334; 500 MG/5ML; MG/5ML
500-334 SOLUTION ORAL
Qty: 1419 | Refills: 0 | Status: ACTIVE | COMMUNITY
Start: 2022-05-31

## 2022-11-17 NOTE — HISTORY OF PRESENT ILLNESS
[Former] : former [TextBox_4] : 88M PMH mild COPD, HTN, HLD, CKD4, VLADIMIR (untreated) who presents for f/u cough/SOB, COPD. Is currently on Anoro. He feels he gets SOB when walking mainly because of his R hip pain, for which he is getting joint injections. No recent hospitalizations. Denies increased sputum production or hemoptysis. No chest pain or pressure. No N/V/D. No fevers, no chills.  [TextBox_11] : 1 [TextBox_13] : 10 [YearQuit] : 1963

## 2022-12-02 ENCOUNTER — RX RENEWAL (OUTPATIENT)
Age: 87
End: 2022-12-02

## 2022-12-08 ENCOUNTER — APPOINTMENT (OUTPATIENT)
Dept: INTERNAL MEDICINE | Facility: CLINIC | Age: 87
End: 2022-12-08
Payer: MEDICARE

## 2022-12-08 VITALS
OXYGEN SATURATION: 97 % | DIASTOLIC BLOOD PRESSURE: 60 MMHG | SYSTOLIC BLOOD PRESSURE: 110 MMHG | WEIGHT: 170 LBS | BODY MASS INDEX: 31.28 KG/M2 | HEIGHT: 62 IN | HEART RATE: 67 BPM

## 2022-12-08 DIAGNOSIS — R53.83 OTHER FATIGUE: ICD-10-CM

## 2022-12-08 DIAGNOSIS — M25.551 PAIN IN RIGHT HIP: ICD-10-CM

## 2022-12-08 PROCEDURE — 99215 OFFICE O/P EST HI 40 MIN: CPT

## 2022-12-08 NOTE — PHYSICAL EXAM
[Well Nourished] : well nourished [Well Developed] : well developed [Well-Appearing] : well-appearing [Normal Sclera/Conjunctiva] : normal sclera/conjunctiva [PERRL] : pupils equal round and reactive to light [EOMI] : extraocular movements intact [Normal Outer Ear/Nose] : the outer ears and nose were normal in appearance [Normal Oropharynx] : the oropharynx was normal [No JVD] : no jugular venous distention [No Lymphadenopathy] : no lymphadenopathy [Supple] : supple [Thyroid Normal, No Nodules] : the thyroid was normal and there were no nodules present [No Respiratory Distress] : no respiratory distress  [No Accessory Muscle Use] : no accessory muscle use [Clear to Auscultation] : lungs were clear to auscultation bilaterally [Normal Rate] : normal rate  [Regular Rhythm] : with a regular rhythm [Normal S1, S2] : normal S1 and S2 [No Murmur] : no murmur heard [No Carotid Bruits] : no carotid bruits [No Abdominal Bruit] : a ~M bruit was not heard ~T in the abdomen [No Varicosities] : no varicosities [Pedal Pulses Present] : the pedal pulses are present [No Edema] : there was no peripheral edema [No Palpable Aorta] : no palpable aorta [No Extremity Clubbing/Cyanosis] : no extremity clubbing/cyanosis [Soft] : abdomen soft [Non Tender] : non-tender [Non-distended] : non-distended [No Masses] : no abdominal mass palpated [No HSM] : no HSM [Normal Bowel Sounds] : normal bowel sounds [Normal Posterior Cervical Nodes] : no posterior cervical lymphadenopathy [Normal Anterior Cervical Nodes] : no anterior cervical lymphadenopathy [No CVA Tenderness] : no CVA  tenderness [No Spinal Tenderness] : no spinal tenderness [No Rash] : no rash [Coordination Grossly Intact] : coordination grossly intact [No Focal Deficits] : no focal deficits [Normal Gait] : normal gait [Normal Affect] : the affect was normal [Normal Insight/Judgement] : insight and judgment were intact [de-identified] : OSTOMY IN PLACE [de-identified] : RIGHT HIP PAIN  [de-identified] : LEFT UPPER EXTREMITUY ECCHMOSOIS  pROMINENT BICEP

## 2022-12-08 NOTE — PLAN
[FreeTextEntry1] : PT HERE FOR FOLLOW UP  FEELS OK  HAS SEEN DR SOMMERS FROM RENAL OVERALL MAJOR ISSUE IS R HIP HAS BEEN SEEIN [facility] HAS SEEN PULM AS WELL FOR MILD COPD \par ALOS HAS SEEN MD FOR MEDICAL MARIJUANA\par PT IS IN PAIN BUT AT HIGHER REISK FOR SURGERY\par HE MAY CONISDER SURGERY IF NO RELIEF FORM PAIN\par D.W SON \par WILL FOLLLWO UP WITH DR SOMMERS FOR BLOOD WORK

## 2022-12-08 NOTE — HISTORY OF PRESENT ILLNESS
[Family Member] : family member [FreeTextEntry1] : FOLLOW UP ON BP AN R IGHT HIP PAN [de-identified] : PT HERE FOR FOLLOW UP  FEELS OK  HAS SEEN DR SOMMERS FROM RE[facility] OVERALL MAJOR ISSUE IS R HIP HAS BEEN SEEIN [facility] HAS SEEN PULM AS WELL FOR MILD COPD \emma EDWARDS HAS SEEN MD FOR MEDICAL MARIJUANA

## 2023-01-15 ENCOUNTER — RX RENEWAL (OUTPATIENT)
Age: 88
End: 2023-01-15

## 2023-02-04 ENCOUNTER — NON-APPOINTMENT (OUTPATIENT)
Age: 88
End: 2023-02-04

## 2023-02-07 ENCOUNTER — APPOINTMENT (OUTPATIENT)
Dept: DERMATOLOGY | Facility: CLINIC | Age: 88
End: 2023-02-07
Payer: MEDICARE

## 2023-02-07 PROCEDURE — 99213 OFFICE O/P EST LOW 20 MIN: CPT

## 2023-02-08 ENCOUNTER — APPOINTMENT (OUTPATIENT)
Dept: FAMILY MEDICINE | Facility: CLINIC | Age: 88
End: 2023-02-08

## 2023-02-08 ENCOUNTER — APPOINTMENT (OUTPATIENT)
Dept: INTERNAL MEDICINE | Facility: CLINIC | Age: 88
End: 2023-02-08
Payer: MEDICARE

## 2023-02-08 ENCOUNTER — NON-APPOINTMENT (OUTPATIENT)
Age: 88
End: 2023-02-08

## 2023-02-08 VITALS
DIASTOLIC BLOOD PRESSURE: 46 MMHG | WEIGHT: 170 LBS | BODY MASS INDEX: 31.28 KG/M2 | HEART RATE: 54 BPM | SYSTOLIC BLOOD PRESSURE: 115 MMHG | HEIGHT: 62 IN | RESPIRATION RATE: 16 BRPM

## 2023-02-08 PROCEDURE — 99215 OFFICE O/P EST HI 40 MIN: CPT

## 2023-02-08 RX ORDER — SILVER SULFADIAZINE 10 MG/G
1 CREAM TOPICAL TWICE DAILY
Qty: 1 | Refills: 1 | Status: ACTIVE | COMMUNITY
Start: 2023-02-08 | End: 1900-01-01

## 2023-02-22 ENCOUNTER — APPOINTMENT (OUTPATIENT)
Dept: ORTHOPEDIC SURGERY | Facility: CLINIC | Age: 88
End: 2023-02-22
Payer: MEDICARE

## 2023-02-22 ENCOUNTER — APPOINTMENT (OUTPATIENT)
Dept: INTERNAL MEDICINE | Facility: CLINIC | Age: 88
End: 2023-02-22
Payer: MEDICARE

## 2023-02-22 VITALS
BODY MASS INDEX: 31.47 KG/M2 | HEART RATE: 64 BPM | SYSTOLIC BLOOD PRESSURE: 122 MMHG | HEIGHT: 62 IN | WEIGHT: 171 LBS | DIASTOLIC BLOOD PRESSURE: 80 MMHG | OXYGEN SATURATION: 98 %

## 2023-02-22 VITALS
SYSTOLIC BLOOD PRESSURE: 124 MMHG | BODY MASS INDEX: 31.28 KG/M2 | HEIGHT: 62 IN | HEART RATE: 80 BPM | DIASTOLIC BLOOD PRESSURE: 62 MMHG | WEIGHT: 170 LBS

## 2023-02-22 DIAGNOSIS — L89.319 PRESSURE ULCER OF RIGHT BUTTOCK, UNSPECIFIED STAGE: ICD-10-CM

## 2023-02-22 DIAGNOSIS — L89.90 PRESSURE ULCER OF UNSPECIFIED SITE, UNSPECIFIED STAGE: ICD-10-CM

## 2023-02-22 DIAGNOSIS — M16.0 BILATERAL PRIMARY OSTEOARTHRITIS OF HIP: ICD-10-CM

## 2023-02-22 PROCEDURE — 73502 X-RAY EXAM HIP UNI 2-3 VIEWS: CPT

## 2023-02-22 PROCEDURE — 99213 OFFICE O/P EST LOW 20 MIN: CPT

## 2023-02-22 PROCEDURE — 99215 OFFICE O/P EST HI 40 MIN: CPT

## 2023-02-22 NOTE — DISCUSSION/SUMMARY
[Medication Risks Reviewed] : Medication risks reviewed [Surgical risks reviewed] : Surgical risks reviewed [de-identified] : 88 y/o M pt presents with advanced right hip osteoarthritis. The nature of his condition and treatment options were discussed in detail. The pt is a candidate for a right ROMINA.He is extremely high risk. THe surgery was discussed in detail. The pt has exhausted conservative treatment such as bursa injection, intraarticular injections, and antiinflammatories. We are concerned for his advanced age, hx of COPD and kidney disease, specifically the risk of dialysis. However, the pt is losing his quality of life. We discussed the risk of infection and possibly needing dialysis after surgery. The pt understands he needs nephrologic and cardiac clearance. He understands his surgical site of his ileostomy will be covered during surgery. He will talk with the surgical coordinators to schedule a right ROMINA.  He has a small pressure sore centrally which will not prohibit surgery.  There is no current sign of infection.  We discussed the risk of prolonged hospitalization and postoperative medical complication.  Both the patient and family is aware that he is extremely high risk.  They understand that this risks include end-stage renal disease requiring dialysis, infection, fracture, medical complications including perioperative mortality.\par \par The patient is a 89 year individual with end stage arthritis of their right hip joint. Based upon the patient's continued symptoms and failure to respond to conservative treatment (including HA injections, cortisone injections, over the counter medications, and PT) I have recommended a right total hip arthroplasty for this patient. A long discussion took place with the patient describing what a total joint replacement is and what the procedure would entail. A total hip arthroplasty model, similar to the implant that will be used during the operation, was utilized to demonstrate and to discuss the various bearing surfaces of the implants. The hospitalization and post-operative care and rehabilitation were also discussed. The use of perioperative antibiotics and DVT prophylaxis were discussed. The risk, benefits and alternatives to a surgical intervention were discussed at length with the patient. The patient was also advised of risks related to the medical comorbidities, elevated body mass index (BMI), and smoking where applicable. We discussed how to reduce modifiable risk factors and encouraged smoking cessation were applicable. A lengthy discussion took place to review the most common complications including but not limited to: deep vein thrombosis, pulmonary embolus, heart attack, stroke, infection, wound breakdown, numbness, damage to nerves, tendon, muscles, arteries or other blood vessels, death and other possible complications from anesthesia. The patient was told that we will take steps to minimize these risks by using sterile technique, antibiotics and DVT prophylaxis when appropriate and follow the patient postoperatively in the office setting to monitor progress. The possibility of recurrent pain, no improvement in pain and actual worsening of pain were also discussed with the patient.\par The discharge plan of care focused on the patient going home following surgery. The patient was encouraged to make the necessary arrangements to have someone stay with them when they are discharged home. Following discharge, a home care nurse will visit the patient. The home care nurse will open your home care case and request home physical therapy services. Home physical therapy will commence following discharge provided it is appropriate and covered by the health insurance benefit plan. \par The benefits of surgery were discussed with the patient including the potential for improving his current clinical condition through operative intervention. Alternatives to surgical intervention including continued conservative management were also discussed in detail. All questions were answered to the satisfaction of the patient. The treatment plan of care, as well as a model of a total hip arthroplasty equivalent to the one that will be used for their total joint replacement, was shared with the patient. The patient agreed to the plan of care as well as the use of implants in their total joint replacement.

## 2023-02-22 NOTE — PLAN
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

## 2023-02-22 NOTE — HISTORY OF PRESENT ILLNESS
[Family Member] : family member [FreeTextEntry1] : BED SORE [de-identified] : 89 PT HERE FOR FOLLOW UP  FEELS OK  HAS SEEN DR SOMMERS FROM [facility] OVERALL MAJOR ISSUE IS R HIP HAS BEEN SEEIN [facility] HAS SEEN PULM AS WELL FOR MILD COPD \par   REASON FOR VISIT IS FOLLOW UP ON  PRESSURE SORES  ON BUTTOCKS HAS SEEN DERM AND NOW HERE FOR FOLLOWUP AGAIN  WS GIVEN  RX FOR SILVADNE LAST VISIT \par

## 2023-02-22 NOTE — PLAN
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

## 2023-02-22 NOTE — PHYSICAL EXAM
[Well Nourished] : well nourished [Well Developed] : well developed [Well-Appearing] : well-appearing [Normal Sclera/Conjunctiva] : normal sclera/conjunctiva [PERRL] : pupils equal round and reactive to light [EOMI] : extraocular movements intact [Normal Outer Ear/Nose] : the outer ears and nose were normal in appearance [Normal Oropharynx] : the oropharynx was normal [No JVD] : no jugular venous distention [No Lymphadenopathy] : no lymphadenopathy [Supple] : supple [Thyroid Normal, No Nodules] : the thyroid was normal and there were no nodules present [No Respiratory Distress] : no respiratory distress  [No Accessory Muscle Use] : no accessory muscle use [Clear to Auscultation] : lungs were clear to auscultation bilaterally [Normal Rate] : normal rate  [Regular Rhythm] : with a regular rhythm [Normal S1, S2] : normal S1 and S2 [No Murmur] : no murmur heard [No Carotid Bruits] : no carotid bruits [No Abdominal Bruit] : a ~M bruit was not heard ~T in the abdomen [No Varicosities] : no varicosities [Pedal Pulses Present] : the pedal pulses are present [No Edema] : there was no peripheral edema [No Palpable Aorta] : no palpable aorta [No Extremity Clubbing/Cyanosis] : no extremity clubbing/cyanosis [Soft] : abdomen soft [Non Tender] : non-tender [Non-distended] : non-distended [No Masses] : no abdominal mass palpated [No HSM] : no HSM [Normal Bowel Sounds] : normal bowel sounds [No CVA Tenderness] : no CVA  tenderness [No Spinal Tenderness] : no spinal tenderness [No Rash] : no rash [Coordination Grossly Intact] : coordination grossly intact [No Focal Deficits] : no focal deficits [Normal Gait] : normal gait [Normal Affect] : the affect was normal [Normal Insight/Judgement] : insight and judgment were intact [de-identified] : OSTOMY IN PLACE [de-identified] : RIGHT HIP PAIN  [de-identified] : 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

## 2023-02-22 NOTE — PHYSICAL EXAM
[LE] : Sensory: Intact in bilateral lower extremities [ALL] : dorsalis pedis, posterior tibial, femoral, popliteal, and radial 2+ and symmetric bilaterally [Normal] : Alert and in no acute distress [Poor Appearance] : well-appearing [de-identified] : GENERAL APPEARANCE: Well nourished and hydrated, pleasant, alert, and oriented x 3. Appears their stated age. \par HEENT: Normocephalic, extraocular eye motion intact. Nasal septum midline. Oral cavity clear. External auditory canal clear. \par RESPIRATORY: Breath sounds clear and audible in all lobes. No wheezing, No accessory muscle use.\par CARDIOVASCULAR: No apparent abnormalities. No lower leg edema. No varicosities. Pedal pulses are palpable.\par NEUROLOGIC: Sensation is normal, no muscle weakness in the upper or lower extremities.\par DERMATOLOGIC: No apparent skin lesions, moist, warm, no rash.\par SPINE: Cervical spine appears normal and moves freely; thoracic spine appears normal and moves freely; lumbosacral spine appears normal and moves freely, normal, nontender.\par MUSCULOSKELETAL: Hands, wrists, and elbows are normal and move freely, shoulders are normal and move freely.  [de-identified] : Right hip exam shows pain with straight leg raise, pain over treater trochanter, positive Stinchfield maneuver  [de-identified] : 3V xray of the right hip done in the office today and reviewed by Dr. Alessandro Sarmiento demonstrates advanced bilateral hip osteoarthritis

## 2023-02-22 NOTE — HISTORY OF PRESENT ILLNESS
[FreeTextEntry1] : BED SORE [de-identified] : PT HERE FOR FOLLOW UP  FEELS OK  HAS SEEN DR SOMMERS FROM [facility] MAJOR ISSUE IS R HIP HAS BEEN SEEIN [facility] HAS SEEN PULM AS WELL FOR MILD COPD \par EARLENE REASON FOR VISIT IS PRESSURE SORES  ON BUTTOCKS HAS SEEN DERM AND NOW HERE FOR FOLLOWUP \par

## 2023-02-22 NOTE — END OF VISIT
[FreeTextEntry3] : I, Julian Umanzor, acted solely as a scribe for Dr. Alessandro Sarmiento on 02/22/2023

## 2023-02-22 NOTE — PHYSICAL EXAM
[Well Nourished] : well nourished [Well Developed] : well developed [Well-Appearing] : well-appearing [Normal Sclera/Conjunctiva] : normal sclera/conjunctiva [PERRL] : pupils equal round and reactive to light [EOMI] : extraocular movements intact [Normal Outer Ear/Nose] : the outer ears and nose were normal in appearance [Normal Oropharynx] : the oropharynx was normal [No JVD] : no jugular venous distention [No Lymphadenopathy] : no lymphadenopathy [Supple] : supple [Thyroid Normal, No Nodules] : the thyroid was normal and there were no nodules present [No Respiratory Distress] : no respiratory distress  [No Accessory Muscle Use] : no accessory muscle use [Clear to Auscultation] : lungs were clear to auscultation bilaterally [Normal Rate] : normal rate  [Regular Rhythm] : with a regular rhythm [Normal S1, S2] : normal S1 and S2 [No Murmur] : no murmur heard [No Carotid Bruits] : no carotid bruits [No Abdominal Bruit] : a ~M bruit was not heard ~T in the abdomen [No Varicosities] : no varicosities [Pedal Pulses Present] : the pedal pulses are present [No Edema] : there was no peripheral edema [No Palpable Aorta] : no palpable aorta [No Extremity Clubbing/Cyanosis] : no extremity clubbing/cyanosis [Soft] : abdomen soft [Non Tender] : non-tender [Non-distended] : non-distended [No Masses] : no abdominal mass palpated [No HSM] : no HSM [Normal Bowel Sounds] : normal bowel sounds [No CVA Tenderness] : no CVA  tenderness [No Spinal Tenderness] : no spinal tenderness [No Rash] : no rash [Coordination Grossly Intact] : coordination grossly intact [No Focal Deficits] : no focal deficits [Normal Gait] : normal gait [Normal Affect] : the affect was normal [Normal Insight/Judgement] : insight and judgment were intact [de-identified] : OSTOMY IN PLACE [de-identified] : RIGHT HIP PAIN  [de-identified] : 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

## 2023-02-22 NOTE — HISTORY OF PRESENT ILLNESS
[Worsening] : worsening [7] : a minimum pain level of 7/10 [10] : a current pain level of 10/10 [Walking] : worsened by walking [Rest] : relieved by rest [de-identified] : 88 y/o M pt presents with right hip pain. Pt states his pain has been present for many years. He has pain over the lateral aspect of the hip and radiates over the groin. He has severe right hip osteoarthritis. He is on tylenol with codeine. He has difficulty navigating stairs. He has been by Dr. Lopez and has been receiving intermittent bursa injections. He had a cortisone injection his hip in December 2021 that he states helped his pain immensely. He Had another one in April 2022 that moderate relieved his pain. He also had intraarticular injections in the past which also did not helped. States the hip is starting to hurt him more. States is chronic kidney disease as well as COPD. Pt has a hx of ileostomy. \par \par

## 2023-03-06 ENCOUNTER — APPOINTMENT (OUTPATIENT)
Dept: PULMONOLOGY | Facility: CLINIC | Age: 88
End: 2023-03-06
Payer: MEDICARE

## 2023-03-06 VITALS
HEIGHT: 64 IN | RESPIRATION RATE: 16 BRPM | WEIGHT: 170 LBS | HEART RATE: 67 BPM | OXYGEN SATURATION: 98 % | BODY MASS INDEX: 29.02 KG/M2 | SYSTOLIC BLOOD PRESSURE: 114 MMHG | DIASTOLIC BLOOD PRESSURE: 60 MMHG

## 2023-03-06 VITALS — BODY MASS INDEX: 29.02 KG/M2 | HEIGHT: 64 IN | WEIGHT: 170 LBS

## 2023-03-06 DIAGNOSIS — G47.33 OBSTRUCTIVE SLEEP APNEA (ADULT) (PEDIATRIC): ICD-10-CM

## 2023-03-06 PROCEDURE — 85018 HEMOGLOBIN: CPT | Mod: QW

## 2023-03-06 PROCEDURE — 94729 DIFFUSING CAPACITY: CPT

## 2023-03-06 PROCEDURE — 99214 OFFICE O/P EST MOD 30 MIN: CPT | Mod: 25

## 2023-03-06 PROCEDURE — 94010 BREATHING CAPACITY TEST: CPT

## 2023-03-06 PROCEDURE — 94727 GAS DIL/WSHOT DETER LNG VOL: CPT

## 2023-03-06 RX ORDER — ASPIRIN 81 MG
81 TABLET, DELAYED RELEASE (ENTERIC COATED) ORAL
Refills: 0 | Status: DISCONTINUED | COMMUNITY
End: 2023-03-06

## 2023-03-06 NOTE — HISTORY OF PRESENT ILLNESS
[Former] : former [TextBox_4] : 89M PMH mild COPD, HTN, HLD, CKD4, VLADIMIR (untreated) who presents for f/u cough/SOB, COPD. PFT today showed FEV1/FVC 74.5%, FEV1 135%, %, TLC 94.5%, DLCOc 76.3%. Previously in 02/2022 his FEV1 was 132.5%. Is going for R hip replacement on 3/24/23. He was diagnosed with VLADIMIR many years ago, but is untreated. He does report fatigue during the day - he attributes this to having to use the bathroom frequently at night, getting up every 2-3 hours for this.  [TextBox_11] : 1 [TextBox_13] : 10 [YearQuit] : 1963

## 2023-03-08 ENCOUNTER — OUTPATIENT (OUTPATIENT)
Dept: OUTPATIENT SERVICES | Facility: HOSPITAL | Age: 88
LOS: 1 days | End: 2023-03-08
Payer: MEDICARE

## 2023-03-08 DIAGNOSIS — G47.33 OBSTRUCTIVE SLEEP APNEA (ADULT) (PEDIATRIC): ICD-10-CM

## 2023-03-08 PROCEDURE — 95800 SLP STDY UNATTENDED: CPT

## 2023-03-08 PROCEDURE — G0400: CPT | Mod: 26

## 2023-03-13 ENCOUNTER — OUTPATIENT (OUTPATIENT)
Dept: OUTPATIENT SERVICES | Facility: HOSPITAL | Age: 88
LOS: 1 days | End: 2023-03-13
Payer: MEDICARE

## 2023-03-13 VITALS
SYSTOLIC BLOOD PRESSURE: 120 MMHG | HEART RATE: 74 BPM | DIASTOLIC BLOOD PRESSURE: 60 MMHG | TEMPERATURE: 97 F | WEIGHT: 170.2 LBS | OXYGEN SATURATION: 98 % | RESPIRATION RATE: 17 BRPM | HEIGHT: 64 IN

## 2023-03-13 DIAGNOSIS — Z29.9 ENCOUNTER FOR PROPHYLACTIC MEASURES, UNSPECIFIED: ICD-10-CM

## 2023-03-13 DIAGNOSIS — M16.11 UNILATERAL PRIMARY OSTEOARTHRITIS, RIGHT HIP: ICD-10-CM

## 2023-03-13 DIAGNOSIS — Z01.818 ENCOUNTER FOR OTHER PREPROCEDURAL EXAMINATION: ICD-10-CM

## 2023-03-13 DIAGNOSIS — Z98.890 OTHER SPECIFIED POSTPROCEDURAL STATES: Chronic | ICD-10-CM

## 2023-03-13 DIAGNOSIS — N18.4 CHRONIC KIDNEY DISEASE, STAGE 4 (SEVERE): ICD-10-CM

## 2023-03-13 DIAGNOSIS — Z90.49 ACQUIRED ABSENCE OF OTHER SPECIFIED PARTS OF DIGESTIVE TRACT: Chronic | ICD-10-CM

## 2023-03-13 DIAGNOSIS — Z98.49 CATARACT EXTRACTION STATUS, UNSPECIFIED EYE: Chronic | ICD-10-CM

## 2023-03-13 DIAGNOSIS — Z13.89 ENCOUNTER FOR SCREENING FOR OTHER DISORDER: ICD-10-CM

## 2023-03-13 DIAGNOSIS — I10 ESSENTIAL (PRIMARY) HYPERTENSION: ICD-10-CM

## 2023-03-13 LAB
A1C WITH ESTIMATED AVERAGE GLUCOSE RESULT: 5.2 % — SIGNIFICANT CHANGE UP (ref 4–5.6)
ANION GAP SERPL CALC-SCNC: 17 MMOL/L — SIGNIFICANT CHANGE UP (ref 5–17)
APTT BLD: 29.6 SEC — SIGNIFICANT CHANGE UP (ref 27.5–35.5)
BASOPHILS # BLD AUTO: 0.06 K/UL — SIGNIFICANT CHANGE UP (ref 0–0.2)
BASOPHILS NFR BLD AUTO: 0.6 % — SIGNIFICANT CHANGE UP (ref 0–2)
BLD GP AB SCN SERPL QL: SIGNIFICANT CHANGE UP
BUN SERPL-MCNC: 54.9 MG/DL — HIGH (ref 8–20)
CALCIUM SERPL-MCNC: 10.3 MG/DL — SIGNIFICANT CHANGE UP (ref 8.4–10.5)
CHLORIDE SERPL-SCNC: 102 MMOL/L — SIGNIFICANT CHANGE UP (ref 96–108)
CO2 SERPL-SCNC: 21 MMOL/L — LOW (ref 22–29)
CREAT SERPL-MCNC: 6.54 MG/DL — HIGH (ref 0.5–1.3)
EGFR: 8 ML/MIN/1.73M2 — LOW
EOSINOPHIL # BLD AUTO: 0.53 K/UL — HIGH (ref 0–0.5)
EOSINOPHIL NFR BLD AUTO: 5.6 % — SIGNIFICANT CHANGE UP (ref 0–6)
ESTIMATED AVERAGE GLUCOSE: 103 MG/DL — SIGNIFICANT CHANGE UP (ref 68–114)
GLUCOSE SERPL-MCNC: 80 MG/DL — SIGNIFICANT CHANGE UP (ref 70–99)
HCT VFR BLD CALC: 31.9 % — LOW (ref 39–50)
HGB BLD-MCNC: 10.9 G/DL — LOW (ref 13–17)
IMM GRANULOCYTES NFR BLD AUTO: 1.4 % — HIGH (ref 0–0.9)
INR BLD: 1.03 RATIO — SIGNIFICANT CHANGE UP (ref 0.88–1.16)
LYMPHOCYTES # BLD AUTO: 1.51 K/UL — SIGNIFICANT CHANGE UP (ref 1–3.3)
LYMPHOCYTES # BLD AUTO: 15.9 % — SIGNIFICANT CHANGE UP (ref 13–44)
MCHC RBC-ENTMCNC: 33.4 PG — SIGNIFICANT CHANGE UP (ref 27–34)
MCHC RBC-ENTMCNC: 34.2 GM/DL — SIGNIFICANT CHANGE UP (ref 32–36)
MCV RBC AUTO: 97.9 FL — SIGNIFICANT CHANGE UP (ref 80–100)
MONOCYTES # BLD AUTO: 1.17 K/UL — HIGH (ref 0–0.9)
MONOCYTES NFR BLD AUTO: 12.3 % — SIGNIFICANT CHANGE UP (ref 2–14)
MRSA PCR RESULT.: SIGNIFICANT CHANGE UP
NEUTROPHILS # BLD AUTO: 6.12 K/UL — SIGNIFICANT CHANGE UP (ref 1.8–7.4)
NEUTROPHILS NFR BLD AUTO: 64.2 % — SIGNIFICANT CHANGE UP (ref 43–77)
PLATELET # BLD AUTO: 173 K/UL — SIGNIFICANT CHANGE UP (ref 150–400)
POTASSIUM SERPL-MCNC: 4.5 MMOL/L — SIGNIFICANT CHANGE UP (ref 3.5–5.3)
POTASSIUM SERPL-SCNC: 4.5 MMOL/L — SIGNIFICANT CHANGE UP (ref 3.5–5.3)
PROTHROM AB SERPL-ACNC: 12 SEC — SIGNIFICANT CHANGE UP (ref 10.5–13.4)
RBC # BLD: 3.26 M/UL — LOW (ref 4.2–5.8)
RBC # FLD: 14 % — SIGNIFICANT CHANGE UP (ref 10.3–14.5)
S AUREUS DNA NOSE QL NAA+PROBE: SIGNIFICANT CHANGE UP
SODIUM SERPL-SCNC: 140 MMOL/L — SIGNIFICANT CHANGE UP (ref 135–145)
WBC # BLD: 9.52 K/UL — SIGNIFICANT CHANGE UP (ref 3.8–10.5)
WBC # FLD AUTO: 9.52 K/UL — SIGNIFICANT CHANGE UP (ref 3.8–10.5)

## 2023-03-13 PROCEDURE — G0463: CPT

## 2023-03-13 PROCEDURE — 93010 ELECTROCARDIOGRAM REPORT: CPT

## 2023-03-13 PROCEDURE — 93005 ELECTROCARDIOGRAM TRACING: CPT

## 2023-03-13 NOTE — H&P PST ADULT - ASSESSMENT
89 y/o male with PMH HTN, COPD, anemia, VLADIMIR with no tx, stage 4 kidney failure, precancerous polyp in colon s/p colectomy and colostomy, Ulcerative colitis, HTN, GERD now with right hip osteoarthritis scheduled for right hip total joint replacement 3/24/2023.    -Medical evaluation pending  -Cardiac eval pending  Nephrology eval pending  - Patient educated on ERP protocol (written/verbal)- verbalized understanding  -Educated on NSAIDS, multivitamins and herbals that increase the risk of bleeding and need to be stopped 5 days before procedure  -Educated on infection prevention  -Covid testing 3-5 days prior to surgery   -Tylenol can be taken 5 days before surgery if needed for pain  -Will continue all other medications as prescribed  -Verbalized understanding of all instructions.    OPIOID RISK TOOL    JANET EACH BOX THAT APPLIES AND ADD TOTALS AT THE END    FAMILY HISTORY OF SUBSTANCE ABUSE                 FEMALE         MALE                                                Alcohol                             [  ]1 pt          [  ]3pts                                               Illegal Durgs                     [  ]2 pts        [  ]3pts                                               Rx Drugs                           [  ]4 pts        [  ]4 pts    PERSONAL HISTORY OF SUBSTANCE ABUSE                                                                                          Alcohol                             [  ]3 pts       [  ]3 pts                                               Illegal Drugs                     [  ]4 pts        [  ]4 pts                                               Rx Drugs                           [  ]5 pts        [  ]5 pts    AGE BETWEEN 16-45 YEARS                                      [  ]1 pt         [  ]1 pt    HISTORY OF PREADOLESCENT   SEXUAL ABUSE                                                             [  ]3 pts        [  ]0pts    PSYCHOLOGICAL DISEASE                     ADD, OCD, Bipolar, Schizophrenia        [  ]2 pts         [  ]2 pts                      Depression                                               [  ]1 pt           [  ]1 pt           SCORING TOTAL   (add numbers and type here)              (0)                                     A score of 3 or lower indicated LOW risk for future opioid abuse  A score of 4 to 7 indicated moderate risk for future opioid abuse  A score of 8 or higher indicates a high risk for opioid abuse    CAPRINI SCORE [CLOT]    AGE RELATED RISK FACTORS                                                       MOBILITY RELATED FACTORS  [ ] Age 41-60 years                                            (1 Point)                  [ ] Bed rest                                                        (1 Point)  [ ] Age: 61-74 years                                           (2 Points)                 [ ] Plaster cast                                                   (2 Points)  [x ] Age= 75 years                                              (3 Points)                 [ ] Bed bound for more than 72 hours                 (2 Points)    DISEASE RELATED RISK FACTORS                                               GENDER SPECIFIC FACTORS  [x] Edema in the lower extremities                       (1 Point)                  [ ] Pregnancy                                                     (1 Point)  [ ] Varicose veins                                               (1 Point)                  [ ] Post-partum < 6 weeks                                   (1 Point)             [ x] BMI > 25 Kg/m2                                            (1 Point)                  [ ] Hormonal therapy  or oral contraception          (1 Point)                 [ ] Sepsis (in the previous month)                        (1 Point)                  [ ] History of pregnancy complications                 (1 point)  [ ] Pneumonia or serious lung disease                                               [ ] Unexplained or recurrent                     (1 Point)           (in the previous month)                               (1 Point)  [ ] Abnormal pulmonary function test                     (1 Point)                 SURGERY RELATED RISK FACTORS  [ ] Acute myocardial infarction                              (1 Point)                 [ ]  Section                                             (1 Point)  [ ] Congestive heart failure (in the previous month)  (1 Point)               [ ] Minor surgery                                                  (1 Point)   [ ] Inflammatory bowel disease                             (1 Point)                 [ ] Arthroscopic surgery                                        (2 Points)  [ ] Central venous access                                      (2 Points)                [ x] General surgery lasting more than 45 minutes   (2 Points)       [ ] Stroke (in the previous month)                          (5 Points)               [ ] Elective arthroplasty                                         (5 Points)                                                                                                                                               HEMATOLOGY RELATED FACTORS                                                 TRAUMA RELATED RISK FACTORS  [ ] Prior episodes of VTE                                     (3 Points)                [ ] Fracture of the hip, pelvis, or leg                       (5 Points)  [ ] Positive family history for VTE                         (3 Points)                 [ ] Acute spinal cord injury (in the previous month)  (5 Points)  [ ] Prothrombin 30539 A                                     (3 Points)                 [ ] Paralysis  (less than 1 month)                             (5 Points)  [ ] Factor V Leiden                                             (3 Points)                  [ ] Multiple Trauma within 1 month                        (5 Points)  [ ] Lupus anticoagulants                                     (3 Points)                                                           [ ] Anticardiolipin antibodies                               (3 Points)                                                       [ ] High homocysteine in the blood                      (3 Points)                                             [ ] Other congenital or acquired thrombophilia      (3 Points)                                                [ ] Heparin induced thrombocytopenia                  (3 Points)                                          Total Score [   7       ]    Caprini Score 0 - 2:  Low Risk, No VTE Prophylaxis required for most patients, encourage ambulation  Caprini Score 3 - 6:  At Risk, pharmacologic VTE prophylaxis is indicated for most patients (in the absence of a contraindication)  Caprini Score Greater than or = 7:  High Risk, pharmacologic VTE prophylaxis is indicated for most patients (in the absence of a contraindication)

## 2023-03-13 NOTE — H&P PST ADULT - NSICDXPASTSURGICALHX_GEN_ALL_CORE_FT
PAST SURGICAL HISTORY:  H/O colectomy     History of cholecystectomy     Previous back surgery     S/P cataract surgery

## 2023-03-13 NOTE — H&P PST ADULT - NSICDXFAMILYHX_GEN_ALL_CORE_FT
FAMILY HISTORY:  Father  Still living? No  FH: stroke, Age at diagnosis: Age Unknown    Mother  Still living? No  FH: diabetes mellitus, Age at diagnosis: Age Unknown  FH: heart disease, Age at diagnosis: Age Unknown

## 2023-03-13 NOTE — H&P PST ADULT - ANESTHESIA, PREVIOUS REACTION, PROFILE
respiratory complications during back surgery 11/1961 and completed other surgeries after without any problems/respiratory complications

## 2023-03-13 NOTE — H&P PST ADULT - PROBLEM SELECTOR PLAN 1
88 y/o male with PMH HTN, COPD, anemia, VLADIMIR with no tx, stage 4 kidney failure, precancerous polyp in colon s/p colectomy and colostomy, Ulcerative colitis, HTN, GERD now with right hip osteoarthritis scheduled for right hip total joint replacement 3/24/2023.    -Medical evaluation pending  -Cardiac eval pending  Nephrology eval pending  - Patient educated on ERP protocol (written/verbal)- verbalized understanding  -Educated on NSAIDS, multivitamins and herbals that increase the risk of bleeding and need to be stopped 5 days before procedure  -Educated on infection prevention  -Covid testing 3-5 days prior to surgery   -Tylenol can be taken 5 days before surgery if needed for pain  -Will continue all other medications as prescribed  -Verbalized understanding of all instructions.

## 2023-03-13 NOTE — H&P PST ADULT - NSICDXPASTMEDICALHX_GEN_ALL_CORE_FT
PAST MEDICAL HISTORY:  Chronic kidney failure, stage 4 (severe)     COPD (chronic obstructive pulmonary disease)     GERD (gastroesophageal reflux disease)     H/O ulcerative colitis     HTN (hypertension)

## 2023-03-13 NOTE — H&P PST ADULT - NSHP PST DIAGOTHER LIST_GEN_A_CORE
Abnormal lab results reported to Yeon, Elebiary, PA. Pt. is pending medical, cardiac and nephrology evaluations.

## 2023-03-13 NOTE — H&P PST ADULT - MUSCULOSKELETAL
details… no joint erythema/no joint warmth/decreased ROM/decreased ROM due to pain/strength 5/5 bilateral upper extremities/decreased strength/abnormal gait

## 2023-03-13 NOTE — H&P PST ADULT - HISTORY OF PRESENT ILLNESS
89 y/o male with PMH HTN, COPD, anemia, VLADIMIR with no tx, stage 4 kidney failure, precancerous polyp in colon s/p colectomy and colostomy, Ulcerative colitis, HTN, GERD presents to PST today. Pt. reports pain to right hip pain progressively worse the past 18 months. Described pain as sharp with radiation to RLE. Reports completed cortisone injections previously with the last 12/2022 with norelief. Pt. reports pain is constant. Walking and laying down have been exacerbating pain. Sitting down alleviate pain. Pain levels include a current pain level of 9/10, a minimum pain level of 3/10 and a maximum pain level of 10/10. Pt ambulates with walker. Used to self ambulate 2 miles everyday until 1.5 year ago but can no longer the same due to pain.  89 y/o male with PMH HTN, COPD, anemia, VLADIMIR with no tx, stage 4 kidney failure, precancerous polyp in colon s/p colectomy and colostomy, Ulcerative colitis, HTN, GERD presents to PST today. Pt. reports pain to right hip pain progressively worse the past 18 months. Described pain as sharp with radiation to RLE. Reports completed cortisone injections previously with the last 12/2022 with norelief. Pt. reports pain is constant. Walking and laying down have been exacerbating pain. Sitting down alleviate pain. Pain levels include a current pain level of 9/10, a minimum pain level of 3/10 and a maximum pain level of 10/10. Pt ambulates with walker. Used to self ambulate 2 miles everyday until 1.5 year ago but can no longer the same due to pain. Scheduled for right hip total joint replacement 3/24/2023.

## 2023-03-20 ENCOUNTER — APPOINTMENT (OUTPATIENT)
Dept: INTERNAL MEDICINE | Facility: CLINIC | Age: 88
End: 2023-03-20
Payer: MEDICARE

## 2023-03-20 VITALS
SYSTOLIC BLOOD PRESSURE: 136 MMHG | WEIGHT: 170 LBS | HEIGHT: 64 IN | DIASTOLIC BLOOD PRESSURE: 70 MMHG | BODY MASS INDEX: 29.02 KG/M2

## 2023-03-20 VITALS — SYSTOLIC BLOOD PRESSURE: 132 MMHG | DIASTOLIC BLOOD PRESSURE: 70 MMHG

## 2023-03-20 PROCEDURE — 99215 OFFICE O/P EST HI 40 MIN: CPT

## 2023-03-20 RX ORDER — AMLODIPINE BESYLATE 2.5 MG/1
2.5 TABLET ORAL DAILY
Qty: 90 | Refills: 3 | Status: DISCONTINUED | COMMUNITY
Start: 2020-11-19 | End: 2023-03-20

## 2023-03-20 NOTE — PHYSICAL EXAM
[No Acute Distress] : no acute distress [Well Nourished] : well nourished [Well Developed] : well developed [Well-Appearing] : well-appearing [Normal Sclera/Conjunctiva] : normal sclera/conjunctiva [PERRL] : pupils equal round and reactive to light [EOMI] : extraocular movements intact [Normal Outer Ear/Nose] : the outer ears and nose were normal in appearance [Normal Oropharynx] : the oropharynx was normal [No JVD] : no jugular venous distention [No Lymphadenopathy] : no lymphadenopathy [Supple] : supple [Thyroid Normal, No Nodules] : the thyroid was normal and there were no nodules present [No Respiratory Distress] : no respiratory distress  [No Accessory Muscle Use] : no accessory muscle use [Clear to Auscultation] : lungs were clear to auscultation bilaterally [Normal Rate] : normal rate  [Regular Rhythm] : with a regular rhythm [Normal S1, S2] : normal S1 and S2 [No Murmur] : no murmur heard [No Carotid Bruits] : no carotid bruits [No Abdominal Bruit] : a ~M bruit was not heard ~T in the abdomen [No Varicosities] : no varicosities [Pedal Pulses Present] : the pedal pulses are present [No Edema] : there was no peripheral edema [No Palpable Aorta] : no palpable aorta [No Extremity Clubbing/Cyanosis] : no extremity clubbing/cyanosis [Soft] : abdomen soft [Non Tender] : non-tender [Non-distended] : non-distended [No Masses] : no abdominal mass palpated [No HSM] : no HSM [Normal Bowel Sounds] : normal bowel sounds [Normal Posterior Cervical Nodes] : no posterior cervical lymphadenopathy [Normal Anterior Cervical Nodes] : no anterior cervical lymphadenopathy [No CVA Tenderness] : no CVA  tenderness [No Spinal Tenderness] : no spinal tenderness [No Joint Swelling] : no joint swelling [Grossly Normal Strength/Tone] : grossly normal strength/tone [No Rash] : no rash [Coordination Grossly Intact] : coordination grossly intact [No Focal Deficits] : no focal deficits [Normal Gait] : normal gait [Deep Tendon Reflexes (DTR)] : deep tendon reflexes were 2+ and symmetric [Normal Affect] : the affect was normal [Normal Insight/Judgement] : insight and judgment were intact [de-identified] : OSTOMY

## 2023-03-20 NOTE — PLAN
[FreeTextEntry1] : 89YO MALE WITH HX OF ILIOSOTMY HX CRI  NOT YET ON DIALYSIS SEVERE DJD AND PLAN FOR R HIP SURGERY- HERE FOR MEDICAL CLEARANCE\par PT OVERALL FEELS WELL HAS SEVERE RIGHT HIP PAIN\par PT WITH CRI NO TYET ON DIALYSIS BUT CR IS 6.0  PT IS AWARE \par MAY NEED TO GO ON DIALYSIS POST OP\par SPOKE TO RENAL DR SOMMERS\par THERE IS NO CONTRAINDICATION FOR RIGHT HIP SURGERY AT THIS TIME\par FILLED OUT PAPER FORMS AND FAXED THEM

## 2023-03-20 NOTE — HISTORY OF PRESENT ILLNESS
[No Pertinent Cardiac History] : no history of aortic stenosis, atrial fibrillation, coronary artery disease, recent myocardial infarction, or implantable device/pacemaker [COPD] : COPD [No Adverse Anesthesia Reaction] : no adverse anesthesia reaction in self or family member [Chronic Kidney Disease] : chronic kidney disease [FreeTextEntry1] : Right hip surgery [FreeTextEntry3] : DR DUMONT [FreeTextEntry4] : 88YO MALE WITH HX OF ILIOSOTMY HX CRI  NOT YET ON DIALYSIS SEVERE DJD AND PLAN FOR R HIP SURGERY- HERE FOR MEDICAL CLEARJNACE

## 2023-03-22 ENCOUNTER — APPOINTMENT (OUTPATIENT)
Dept: DERMATOLOGY | Facility: CLINIC | Age: 88
End: 2023-03-22
Payer: MEDICARE

## 2023-03-22 PROBLEM — Z87.19 PERSONAL HISTORY OF OTHER DISEASES OF THE DIGESTIVE SYSTEM: Chronic | Status: ACTIVE | Noted: 2023-03-13

## 2023-03-22 PROBLEM — K21.9 GASTRO-ESOPHAGEAL REFLUX DISEASE WITHOUT ESOPHAGITIS: Chronic | Status: ACTIVE | Noted: 2023-03-13

## 2023-03-22 PROBLEM — J44.9 CHRONIC OBSTRUCTIVE PULMONARY DISEASE, UNSPECIFIED: Chronic | Status: ACTIVE | Noted: 2023-03-13

## 2023-03-22 PROBLEM — N18.4 CHRONIC KIDNEY DISEASE, STAGE 4 (SEVERE): Chronic | Status: ACTIVE | Noted: 2023-03-13

## 2023-03-22 PROBLEM — I10 ESSENTIAL (PRIMARY) HYPERTENSION: Chronic | Status: ACTIVE | Noted: 2023-03-13

## 2023-03-22 LAB — SARS-COV-2 N GENE NPH QL NAA+PROBE: NOT DETECTED

## 2023-03-22 PROCEDURE — 99213 OFFICE O/P EST LOW 20 MIN: CPT

## 2023-03-23 ENCOUNTER — APPOINTMENT (OUTPATIENT)
Dept: INTERNAL MEDICINE | Facility: CLINIC | Age: 88
End: 2023-03-23

## 2023-03-23 ENCOUNTER — TRANSCRIPTION ENCOUNTER (OUTPATIENT)
Age: 88
End: 2023-03-23

## 2023-03-24 ENCOUNTER — INPATIENT (INPATIENT)
Facility: HOSPITAL | Age: 88
LOS: 2 days | Discharge: NURSING, REHAB OR LONG-TERM CARE | DRG: 470 | End: 2023-03-27
Attending: ORTHOPAEDIC SURGERY | Admitting: ORTHOPAEDIC SURGERY
Payer: MEDICARE

## 2023-03-24 ENCOUNTER — APPOINTMENT (OUTPATIENT)
Dept: ORTHOPEDIC SURGERY | Facility: HOSPITAL | Age: 88
End: 2023-03-24

## 2023-03-24 ENCOUNTER — TRANSCRIPTION ENCOUNTER (OUTPATIENT)
Age: 88
End: 2023-03-24

## 2023-03-24 VITALS
SYSTOLIC BLOOD PRESSURE: 133 MMHG | OXYGEN SATURATION: 100 % | HEART RATE: 67 BPM | DIASTOLIC BLOOD PRESSURE: 52 MMHG | TEMPERATURE: 98 F | WEIGHT: 170.2 LBS | HEIGHT: 60 IN | RESPIRATION RATE: 16 BRPM

## 2023-03-24 DIAGNOSIS — G47.33 OBSTRUCTIVE SLEEP APNEA (ADULT) (PEDIATRIC): ICD-10-CM

## 2023-03-24 DIAGNOSIS — M16.11 UNILATERAL PRIMARY OSTEOARTHRITIS, RIGHT HIP: ICD-10-CM

## 2023-03-24 DIAGNOSIS — Z98.890 OTHER SPECIFIED POSTPROCEDURAL STATES: Chronic | ICD-10-CM

## 2023-03-24 DIAGNOSIS — Z87.19 PERSONAL HISTORY OF OTHER DISEASES OF THE DIGESTIVE SYSTEM: ICD-10-CM

## 2023-03-24 DIAGNOSIS — Z98.49 CATARACT EXTRACTION STATUS, UNSPECIFIED EYE: Chronic | ICD-10-CM

## 2023-03-24 DIAGNOSIS — Z90.49 ACQUIRED ABSENCE OF OTHER SPECIFIED PARTS OF DIGESTIVE TRACT: Chronic | ICD-10-CM

## 2023-03-24 LAB
ABO RH CONFIRMATION: SIGNIFICANT CHANGE UP
BASOPHILS # BLD AUTO: 0.05 K/UL — SIGNIFICANT CHANGE UP (ref 0–0.2)
BASOPHILS NFR BLD AUTO: 0.5 % — SIGNIFICANT CHANGE UP (ref 0–2)
EOSINOPHIL # BLD AUTO: 0.42 K/UL — SIGNIFICANT CHANGE UP (ref 0–0.5)
EOSINOPHIL NFR BLD AUTO: 4.6 % — SIGNIFICANT CHANGE UP (ref 0–6)
HCT VFR BLD CALC: 34 % — LOW (ref 39–50)
HGB BLD-MCNC: 11.1 G/DL — LOW (ref 13–17)
IMM GRANULOCYTES NFR BLD AUTO: 2 % — HIGH (ref 0–0.9)
LYMPHOCYTES # BLD AUTO: 1.44 K/UL — SIGNIFICANT CHANGE UP (ref 1–3.3)
LYMPHOCYTES # BLD AUTO: 15.8 % — SIGNIFICANT CHANGE UP (ref 13–44)
MCHC RBC-ENTMCNC: 31.3 PG — SIGNIFICANT CHANGE UP (ref 27–34)
MCHC RBC-ENTMCNC: 32.6 GM/DL — SIGNIFICANT CHANGE UP (ref 32–36)
MCV RBC AUTO: 95.8 FL — SIGNIFICANT CHANGE UP (ref 80–100)
MONOCYTES # BLD AUTO: 0.96 K/UL — HIGH (ref 0–0.9)
MONOCYTES NFR BLD AUTO: 10.5 % — SIGNIFICANT CHANGE UP (ref 2–14)
NEUTROPHILS # BLD AUTO: 6.09 K/UL — SIGNIFICANT CHANGE UP (ref 1.8–7.4)
NEUTROPHILS NFR BLD AUTO: 66.6 % — SIGNIFICANT CHANGE UP (ref 43–77)
PLATELET # BLD AUTO: 162 K/UL — SIGNIFICANT CHANGE UP (ref 150–400)
POTASSIUM SERPL-MCNC: 4.1 MMOL/L — SIGNIFICANT CHANGE UP (ref 3.5–5.3)
POTASSIUM SERPL-SCNC: 4.1 MMOL/L — SIGNIFICANT CHANGE UP (ref 3.5–5.3)
RBC # BLD: 3.55 M/UL — LOW (ref 4.2–5.8)
RBC # FLD: 13.9 % — SIGNIFICANT CHANGE UP (ref 10.3–14.5)
WBC # BLD: 9.14 K/UL — SIGNIFICANT CHANGE UP (ref 3.8–10.5)
WBC # FLD AUTO: 9.14 K/UL — SIGNIFICANT CHANGE UP (ref 3.8–10.5)

## 2023-03-24 PROCEDURE — 99223 1ST HOSP IP/OBS HIGH 75: CPT

## 2023-03-24 PROCEDURE — 27130 TOTAL HIP ARTHROPLASTY: CPT | Mod: AS,RT

## 2023-03-24 PROCEDURE — 73501 X-RAY EXAM HIP UNI 1 VIEW: CPT | Mod: 26,RT

## 2023-03-24 PROCEDURE — 27130 TOTAL HIP ARTHROPLASTY: CPT | Mod: RT

## 2023-03-24 DEVICE — IMPLANTABLE DEVICE: Type: IMPLANTABLE DEVICE | Site: RIGHT | Status: FUNCTIONAL

## 2023-03-24 DEVICE — HEAF FEM CERAMIC DELTA NEUT 36MM: Type: IMPLANTABLE DEVICE | Site: RIGHT | Status: FUNCTIONAL

## 2023-03-24 DEVICE — SCREW ACET EMPOWR 6.5X40MM: Type: IMPLANTABLE DEVICE | Site: RIGHT | Status: FUNCTIONAL

## 2023-03-24 DEVICE — CUP ACET EMPOWR CLUSTER 56MM ALPHA H: Type: IMPLANTABLE DEVICE | Site: RIGHT | Status: FUNCTIONAL

## 2023-03-24 DEVICE — SCREW ACET SZ 6.5X30MM: Type: IMPLANTABLE DEVICE | Site: RIGHT | Status: FUNCTIONAL

## 2023-03-24 RX ORDER — PANTOPRAZOLE SODIUM 20 MG/1
40 TABLET, DELAYED RELEASE ORAL
Refills: 0 | Status: DISCONTINUED | OUTPATIENT
Start: 2023-03-24 | End: 2023-03-24

## 2023-03-24 RX ORDER — CHOLECALCIFEROL (VITAMIN D3) 125 MCG
1 CAPSULE ORAL
Qty: 0 | Refills: 0 | DISCHARGE

## 2023-03-24 RX ORDER — CELECOXIB 200 MG/1
400 CAPSULE ORAL ONCE
Refills: 0 | Status: DISCONTINUED | OUTPATIENT
Start: 2023-03-24 | End: 2023-03-24

## 2023-03-24 RX ORDER — HYDROMORPHONE HYDROCHLORIDE 2 MG/ML
0.25 INJECTION INTRAMUSCULAR; INTRAVENOUS; SUBCUTANEOUS ONCE
Refills: 0 | Status: DISCONTINUED | OUTPATIENT
Start: 2023-03-24 | End: 2023-03-24

## 2023-03-24 RX ORDER — MAGNESIUM HYDROXIDE 400 MG/1
30 TABLET, CHEWABLE ORAL DAILY
Refills: 0 | Status: DISCONTINUED | OUTPATIENT
Start: 2023-03-24 | End: 2023-03-27

## 2023-03-24 RX ORDER — APREPITANT 80 MG/1
40 CAPSULE ORAL ONCE
Refills: 0 | Status: DISCONTINUED | OUTPATIENT
Start: 2023-03-24 | End: 2023-03-24

## 2023-03-24 RX ORDER — TRANEXAMIC ACID 100 MG/ML
1000 INJECTION, SOLUTION INTRAVENOUS ONCE
Refills: 0 | Status: DISCONTINUED | OUTPATIENT
Start: 2023-03-24 | End: 2023-03-24

## 2023-03-24 RX ORDER — UMECLIDINIUM BROMIDE AND VILANTEROL TRIFENATATE 62.5; 25 UG/1; UG/1
1 POWDER RESPIRATORY (INHALATION) DAILY
Refills: 0 | Status: DISCONTINUED | OUTPATIENT
Start: 2023-03-24 | End: 2023-03-27

## 2023-03-24 RX ORDER — NEBIVOLOL HYDROCHLORIDE 5 MG/1
2.5 TABLET ORAL DAILY
Refills: 0 | Status: DISCONTINUED | OUTPATIENT
Start: 2023-03-24 | End: 2023-03-24

## 2023-03-24 RX ORDER — TAMSULOSIN HYDROCHLORIDE 0.4 MG/1
0.4 CAPSULE ORAL AT BEDTIME
Refills: 0 | Status: DISCONTINUED | OUTPATIENT
Start: 2023-03-24 | End: 2023-03-27

## 2023-03-24 RX ORDER — FAMOTIDINE 10 MG/ML
20 INJECTION INTRAVENOUS DAILY
Refills: 0 | Status: DISCONTINUED | OUTPATIENT
Start: 2023-03-24 | End: 2023-03-27

## 2023-03-24 RX ORDER — FENTANYL CITRATE 50 UG/ML
25 INJECTION INTRAVENOUS
Refills: 0 | Status: DISCONTINUED | OUTPATIENT
Start: 2023-03-24 | End: 2023-03-24

## 2023-03-24 RX ORDER — ACETAMINOPHEN 500 MG
975 TABLET ORAL ONCE
Refills: 0 | Status: DISCONTINUED | OUTPATIENT
Start: 2023-03-24 | End: 2023-03-24

## 2023-03-24 RX ORDER — ONDANSETRON 8 MG/1
4 TABLET, FILM COATED ORAL EVERY 6 HOURS
Refills: 0 | Status: DISCONTINUED | OUTPATIENT
Start: 2023-03-24 | End: 2023-03-27

## 2023-03-24 RX ORDER — APIXABAN 2.5 MG/1
2.5 TABLET, FILM COATED ORAL
Refills: 0 | Status: DISCONTINUED | OUTPATIENT
Start: 2023-03-25 | End: 2023-03-27

## 2023-03-24 RX ORDER — NEBIVOLOL HYDROCHLORIDE 5 MG/1
2.5 TABLET ORAL DAILY
Refills: 0 | Status: DISCONTINUED | OUTPATIENT
Start: 2023-03-24 | End: 2023-03-27

## 2023-03-24 RX ORDER — NEBIVOLOL HYDROCHLORIDE 5 MG/1
1 TABLET ORAL
Qty: 0 | Refills: 0 | DISCHARGE

## 2023-03-24 RX ORDER — HYDROMORPHONE HYDROCHLORIDE 2 MG/ML
0.5 INJECTION INTRAMUSCULAR; INTRAVENOUS; SUBCUTANEOUS ONCE
Refills: 0 | Status: DISCONTINUED | OUTPATIENT
Start: 2023-03-24 | End: 2023-03-24

## 2023-03-24 RX ORDER — ONDANSETRON 8 MG/1
4 TABLET, FILM COATED ORAL ONCE
Refills: 0 | Status: DISCONTINUED | OUTPATIENT
Start: 2023-03-24 | End: 2023-03-24

## 2023-03-24 RX ORDER — SODIUM CHLORIDE 9 MG/ML
1000 INJECTION INTRAMUSCULAR; INTRAVENOUS; SUBCUTANEOUS
Refills: 0 | Status: DISCONTINUED | OUTPATIENT
Start: 2023-03-24 | End: 2023-03-27

## 2023-03-24 RX ORDER — POLYETHYLENE GLYCOL 3350 17 G/17G
17 POWDER, FOR SOLUTION ORAL AT BEDTIME
Refills: 0 | Status: DISCONTINUED | OUTPATIENT
Start: 2023-03-24 | End: 2023-03-27

## 2023-03-24 RX ORDER — PROCHLORPERAZINE MALEATE 5 MG
10 TABLET ORAL ONCE
Refills: 0 | Status: COMPLETED | OUTPATIENT
Start: 2023-03-24 | End: 2023-03-24

## 2023-03-24 RX ORDER — TRAMADOL HYDROCHLORIDE 50 MG/1
50 TABLET ORAL
Refills: 0 | Status: DISCONTINUED | OUTPATIENT
Start: 2023-03-24 | End: 2023-03-27

## 2023-03-24 RX ORDER — CEFAZOLIN SODIUM 1 G
2000 VIAL (EA) INJECTION
Refills: 0 | Status: COMPLETED | OUTPATIENT
Start: 2023-03-24 | End: 2023-03-24

## 2023-03-24 RX ORDER — OXYCODONE HYDROCHLORIDE 5 MG/1
5 TABLET ORAL
Refills: 0 | Status: DISCONTINUED | OUTPATIENT
Start: 2023-03-24 | End: 2023-03-27

## 2023-03-24 RX ORDER — SODIUM CHLORIDE 9 MG/ML
1000 INJECTION, SOLUTION INTRAVENOUS
Refills: 0 | Status: DISCONTINUED | OUTPATIENT
Start: 2023-03-24 | End: 2023-03-24

## 2023-03-24 RX ORDER — SODIUM CHLORIDE 9 MG/ML
3 INJECTION INTRAMUSCULAR; INTRAVENOUS; SUBCUTANEOUS EVERY 8 HOURS
Refills: 0 | Status: DISCONTINUED | OUTPATIENT
Start: 2023-03-24 | End: 2023-03-24

## 2023-03-24 RX ORDER — CEFAZOLIN SODIUM 1 G
2000 VIAL (EA) INJECTION ONCE
Refills: 0 | Status: DISCONTINUED | OUTPATIENT
Start: 2023-03-24 | End: 2023-03-24

## 2023-03-24 RX ORDER — CITRIC ACID/SODIUM CITRATE 300-500 MG
15 SOLUTION, ORAL ORAL
Refills: 0 | Status: DISCONTINUED | OUTPATIENT
Start: 2023-03-24 | End: 2023-03-27

## 2023-03-24 RX ORDER — SENNA PLUS 8.6 MG/1
2 TABLET ORAL AT BEDTIME
Refills: 0 | Status: DISCONTINUED | OUTPATIENT
Start: 2023-03-24 | End: 2023-03-27

## 2023-03-24 RX ORDER — TAMSULOSIN HYDROCHLORIDE 0.4 MG/1
1 CAPSULE ORAL
Qty: 0 | Refills: 0 | DISCHARGE

## 2023-03-24 RX ORDER — UMECLIDINIUM BROMIDE AND VILANTEROL TRIFENATATE 62.5; 25 UG/1; UG/1
1 POWDER RESPIRATORY (INHALATION)
Qty: 0 | Refills: 0 | DISCHARGE

## 2023-03-24 RX ORDER — TRAMADOL HYDROCHLORIDE 50 MG/1
25 TABLET ORAL
Refills: 0 | Status: DISCONTINUED | OUTPATIENT
Start: 2023-03-24 | End: 2023-03-27

## 2023-03-24 RX ORDER — ACETAMINOPHEN 500 MG
1000 TABLET ORAL ONCE
Refills: 0 | Status: DISCONTINUED | OUTPATIENT
Start: 2023-03-24 | End: 2023-03-27

## 2023-03-24 RX ORDER — SODIUM BICARBONATE 1 MEQ/ML
650 SYRINGE (ML) INTRAVENOUS
Refills: 0 | Status: DISCONTINUED | OUTPATIENT
Start: 2023-03-24 | End: 2023-03-27

## 2023-03-24 RX ORDER — ACETAMINOPHEN 500 MG
975 TABLET ORAL EVERY 8 HOURS
Refills: 0 | Status: DISCONTINUED | OUTPATIENT
Start: 2023-03-24 | End: 2023-03-27

## 2023-03-24 RX ADMIN — HYDROMORPHONE HYDROCHLORIDE 0.25 MILLIGRAM(S): 2 INJECTION INTRAMUSCULAR; INTRAVENOUS; SUBCUTANEOUS at 10:45

## 2023-03-24 RX ADMIN — FAMOTIDINE 20 MILLIGRAM(S): 10 INJECTION INTRAVENOUS at 14:32

## 2023-03-24 RX ADMIN — HYDROMORPHONE HYDROCHLORIDE 0.5 MILLIGRAM(S): 2 INJECTION INTRAMUSCULAR; INTRAVENOUS; SUBCUTANEOUS at 11:30

## 2023-03-24 RX ADMIN — SENNA PLUS 2 TABLET(S): 8.6 TABLET ORAL at 23:30

## 2023-03-24 RX ADMIN — ONDANSETRON 4 MILLIGRAM(S): 8 TABLET, FILM COATED ORAL at 14:32

## 2023-03-24 RX ADMIN — TAMSULOSIN HYDROCHLORIDE 0.4 MILLIGRAM(S): 0.4 CAPSULE ORAL at 23:30

## 2023-03-24 RX ADMIN — POLYETHYLENE GLYCOL 3350 17 GRAM(S): 17 POWDER, FOR SOLUTION ORAL at 23:30

## 2023-03-24 RX ADMIN — Medication 15 MILLILITER(S): at 23:30

## 2023-03-24 RX ADMIN — Medication 975 MILLIGRAM(S): at 23:30

## 2023-03-24 RX ADMIN — HYDROMORPHONE HYDROCHLORIDE 0.25 MILLIGRAM(S): 2 INJECTION INTRAMUSCULAR; INTRAVENOUS; SUBCUTANEOUS at 11:01

## 2023-03-24 RX ADMIN — HYDROMORPHONE HYDROCHLORIDE 0.25 MILLIGRAM(S): 2 INJECTION INTRAMUSCULAR; INTRAVENOUS; SUBCUTANEOUS at 10:35

## 2023-03-24 RX ADMIN — Medication 2000 MILLIGRAM(S): at 23:30

## 2023-03-24 RX ADMIN — OXYCODONE HYDROCHLORIDE 5 MILLIGRAM(S): 5 TABLET ORAL at 12:42

## 2023-03-24 RX ADMIN — Medication 650 MILLIGRAM(S): at 23:30

## 2023-03-24 RX ADMIN — HYDROMORPHONE HYDROCHLORIDE 0.5 MILLIGRAM(S): 2 INJECTION INTRAMUSCULAR; INTRAVENOUS; SUBCUTANEOUS at 11:15

## 2023-03-24 RX ADMIN — HYDROMORPHONE HYDROCHLORIDE 0.25 MILLIGRAM(S): 2 INJECTION INTRAMUSCULAR; INTRAVENOUS; SUBCUTANEOUS at 11:15

## 2023-03-24 RX ADMIN — Medication 2000 MILLIGRAM(S): at 14:32

## 2023-03-24 NOTE — DISCHARGE NOTE PROVIDER - PROVIDER TOKENS
PROVIDER:[TOKEN:[[id number]]] PROVIDER:[TOKEN:[[id number]]],PROVIDER:[TOKEN:[[id number]]]

## 2023-03-24 NOTE — ASU PREOP CHECKLIST - ALLERGY BAND ON
83-year-old male with past medical history of high blood pressure, hyperlipidemia, on metoprolol 25 mg takes half the dose in the morning and half the dose at night took half the dose this morning presents from cardiologist office Dr. Mensah for abnormal EKG/bradycardia.  Patient reports for many weeks he has been having generalized weakness associated with shortness of breath on exertion.  When he went to the cardiologist today it was sent into the emergency department.  EKG concerning for complete heart block.  Patient placed on monitor, pacer pads.  No fever, chills, n/v, cp,  pleuritic cp, palpitations, diaphoresis, cough, ha/lh/dizziness, numbness/tingling, neck pain/ stiffness, abd pain, diarrhea, constipation, melena/brbpr, urinary symptoms, trauma, edema, calf pain/swelling/erythema, sick contacts, recent travel or rash.    On Exam:   Vital Signs: I have reviewed the initial vital signs. Constitutional: Non toxic appearing pt sitting on stretcher speaking full sentences. Integumentary: No rash. ENT: MMM NECK: Supple, non-tender, no meningeal signs. Cardiovascular: BRasy, radial pulses 2/4 b/l. No JVD. Respiratory: BS present b/l, ctabl, no wheezing or crackles,  no accessory muscle use, no stridor. Gastrointestinal: BS present throughout all 4 quadrants, soft, nd, nt, no rebound tenderness or guarding, no cvat. Musculoskeletal: FROM, no edema, no calf pain/swelling/erythema. Neurologic: AAOx3, motor 5/5 and sensation intact throughout upper and lowe ext, CN II-XII intact, No facial droop or slurring of speech. No focal deficits. no known allergies 83-year-old male with past medical history of high blood pressure, hyperlipidemia, on metoprolol 25 mg takes half the dose in the morning and half the dose at night took half the dose this morning presents from cardiologist office Dr. Mensah for abnormal EKG/bradycardia.  Patient reports for many weeks he has been having generalized weakness associated with shortness of breath on exertion.  When he went to the cardiologist today it was sent into the emergency department.  EKG concerning for complete heart block.  Patient placed on monitor, pacer pads.  No fever, chills, n/v, cp,  pleuritic cp, palpitations, diaphoresis, cough, ha/lh/dizziness, numbness/tingling, neck pain/ stiffness, abd pain, diarrhea, constipation, melena/brbpr, urinary symptoms, trauma, edema, calf pain/swelling/erythema, sick contacts, recent travel or rash.    On Exam:   Vital Signs: I have reviewed the initial vital signs. Constitutional: Non toxic appearing pt sitting on stretcher speaking full sentences. Integumentary: No rash. ENT: MMM NECK: Supple, non-tender, no meningeal signs. Cardiovascular: Shawn, radial pulses 2/4 b/l. No JVD. Respiratory: BS present b/l, ctabl, no wheezing or crackles,  no accessory muscle use, no stridor. Gastrointestinal: BS present throughout all 4 quadrants, soft, nd, nt, no rebound tenderness or guarding, no cvat. Musculoskeletal: FROM, no edema, no calf pain/swelling/erythema. Neurologic: AAOx3, motor 5/5 and sensation intact throughout upper and lowe ext, CN II-XII intact, No facial droop or slurring of speech. No focal deficits.

## 2023-03-24 NOTE — DISCHARGE NOTE PROVIDER - CARE PROVIDER_API CALL
Alessandro Sarmiento)  Orthopaedic Surgery  [address] [place] [address]  Phone: [phone]  Fax: [phone]  Follow Up Time:    Alessandro Sarmiento)  Orthopaedic Surgery  [address] [place] [address]  Phone: [phone]  Fax: [phone]  Follow Up Time:     Jovanni Jones)  Urology  [address], [address]  [place] [address]  Phone: [phone]  Fax: [phone]  Follow Up Time:

## 2023-03-24 NOTE — CONSULT NOTE ADULT - NS ATTEND AMEND GEN_ALL_CORE FT
Patient seen and examined on PACU , s/p R ROMINA , [place] ,   daughter ( Blanca ) at bed side . Tolerated procedure well ,   unable to void ,   s/p straight cath - 700 ml obtained .     Vital Signs Last 24 Hrs  T(C): 36.3 (24 Mar 2023 12:00), Max: 36.9 (24 Mar 2023 06:14)  T(F): 97.4 (24 Mar 2023 12:00), Max: 98.4 (24 Mar 2023 06:14)  HR: 55 (24 Mar 2023 12:00) (55 - 81)  BP: 105/45 (24 Mar 2023 12:00) (91/40 - 133/52)  RR: 16 (24 Mar 2023 12:00) (13 - 16)  SpO2: 100% (24 Mar 2023 12:00) (94% - 100%)    O2 Parameters below as of 24 Mar 2023 10:30  Patient On (Oxygen Delivery Method): room air    [place]  Lungs : CTA B/L   CVS: S1S2 , no rubs , no murmur   R hip dressing + , clean and dry     Hx of BPH  - on Flomax , post op with urinary retention - 700 ml obtained   will have patient to ambulate , stand up , repeat bladder scan 6 hr after first one  and if PVR > 350 ML - place [place] cath , continue Flomax , TOV in am       Above noted and reviewed with NP .   f/u bmp in am , if renal function worse - consider renal eval     Thank you for the courtesy of this consult ,   will follow along with you .

## 2023-03-24 NOTE — DISCHARGE NOTE PROVIDER - NSDCFUSCHEDAPPT_GEN_ALL_CORE_FT
Alessandro Sarmiento  [facility]  ORTHOSURG [address]  Scheduled Appointment: 03/24/2023    Alessandro Sarmiento  [facility]  ORTHOSURG [address]  Scheduled Appointment: 04/14/2023    Elebiary, Yeon J  [facility]  ORTHOSURG [address]  Scheduled Appointment: 05/16/2023    Alessandro Sarmiento  [facility]  ORTHOSURG [address]  Scheduled Appointment: 06/14/2023     Alessandro Sarmiento  [facility]  ORTHOSURG [address]  Scheduled Appointment: 04/14/2023    Elebiary, Yeon J  [facility]  ORTHOSURG [address]  Scheduled Appointment: 05/16/2023    Alessandro Sarmiento  [facility]  ORTHOSURG [address]  Scheduled Appointment: 06/14/2023

## 2023-03-24 NOTE — CONSULT NOTE ADULT - PROBLEM SELECTOR RECOMMENDATION 9
S/P right hip surgery  Post op ABX as per SCIP  DVT ppx/Pain control/PT as per Ortho  IS S/P right hip surgery  Post op ABX as per SCIP  DVT ppx/Pain control/PT as per Ortho  IS  Wound care / WB status -as per ortho

## 2023-03-24 NOTE — CONSULT NOTE ADULT - PROBLEM SELECTOR RECOMMENDATION 4
Patient on Lasix and Nebivolol ( called pharmacy to add)  Hold Lasix and resume as clinically indicated

## 2023-03-24 NOTE — DISCHARGE NOTE PROVIDER - NSDCMRMEDTOKEN_GEN_ALL_CORE_FT
Anoro Ellipta 62.5 mcg-25 mcg/inh inhalation powder: 1 puff(s) inhaled once a day  furosemide 40 mg oral tablet: 1 tab(s) orally once a day  Nebivolol 2.5 mg oral tablet: 1 tab(s) orally once a day  sodium bicarbonate 650 mg oral tablet: 1 tab(s) orally 4 times a day  sodium citrate-citric acid 500 mg-334 mg/5 mL oral solution: 15 milliliter(s) orally 4 times a day (after meals and at bedtime)  tamsulosin 0.4 mg oral capsule: 1 cap(s) orally once a day  Tylenol: 650 milligram(s) orally every 6 hours, As Needed  Vitamin D3 25 mcg (1000 intl units) oral tablet: 1 tab(s) orally once a day   acetaminophen 325 mg oral tablet: 3 tab(s) orally every 8 hours  Anoro Ellipta 62.5 mcg-25 mcg/inh inhalation powder: 1 puff(s) inhaled once a day  apixaban 2.5 mg oral tablet: 1 tab(s) orally 2 times a day  Aspir 81 oral delayed release tablet: 1 orally 2 times a day  furosemide 40 mg oral tablet: 1 tab(s) orally once a day  Nebivolol 2.5 mg oral tablet: 1 tab(s) orally once a day  oxyCODONE 5 mg oral tablet: 1 tab(s) orally every 6 hours as needed for Severe Pain (7 - 10)  senna leaf extract oral tablet: 2 tab(s) orally once a day (at bedtime)  sodium bicarbonate 650 mg oral tablet: 1 tab(s) orally 4 times a day  sodium citrate-citric acid 500 mg-334 mg/5 mL oral solution: 15 milliliter(s) orally 4 times a day (after meals and at bedtime)  tamsulosin 0.4 mg oral capsule: 1 cap(s) orally once a day  Vitamin D3 25 mcg (1000 intl units) oral tablet: 1 tab(s) orally once a day

## 2023-03-24 NOTE — DISCHARGE NOTE PROVIDER - CARE PROVIDERS DIRECT ADDRESSES
[email or web address]

## 2023-03-24 NOTE — PATIENT PROFILE ADULT - FALL HARM RISK - HARM RISK INTERVENTIONS
Assistance with ambulation/Assistance OOB with selected safe patient handling equipment/Communicate Risk of Fall with Harm to all staff/Discuss with provider need for PT consult/Monitor gait and stability/Provide patient with walking aids - walker, cane, crutches/Reinforce activity limits and safety measures with patient and family/Sit up slowly, dangle for a short time, stand at bedside before walking/Tailored Fall Risk Interventions/Use of alarms - bed, chair and/or voice tab/Visual Cue: Yellow wristband and red socks/Bed in lowest position, wheels locked, appropriate side rails in place/Call bell, personal items and telephone in reach/Instruct patient to call for assistance before getting out of bed or chair/Non-slip footwear when patient is out of bed/[place] to call system/Physically safe environment - no spills, clutter or unnecessary equipment/Purposeful Proactive Rounding/Room/bathroom lighting operational, light cord in reach

## 2023-03-24 NOTE — DISCHARGE NOTE PROVIDER - NSDCFUADDINST_GEN_ALL_CORE_FT
The patient will be seen in the office between 2-3 weeks for wound check.   **Your first post-operative visit has been scheduled prior to your admission. PLEASE CONTACT OFFICE TO CONFIRM THE APPOINTMENT DATE. Sutures/Staples/Tape will be removed at that time.  **  The silver based dressing is to be removed 7 days from the date of surgery.   ** CONTACT THE OFFICE IF THE FOLLOWING DEVELOP:  - the dressing becomes soiled or saturated  - you develop a fever greater that 101F  - the wound becomes red or you develop blistering around the wound  * Patient may shower after post-op day #3.   * The patient will continue home PT consistent with posterior total hip replacement protocol and will continue to practice posterior total hip precautions for a minimum of 6 week. Transition to outpatient PT will occur at the time of the first office visit.   * The patient is FULL weight bearing.  * The patient will continue ELIQUIS for 4 weeks after surgery for blood clot prevention, then ASA for next 2 weeks for blood clot prevention.   *** While on aspirin, the patient will take daily omeprazole or other similar medication to protect the stomach from irritation.   * The patient will take OXYCODONE AND TYLENOL for pain control and adjust according to prescription and patient needs. Contact the office if pain increases while taking prescribed pain medications or related concerns develop.  * The patient will take Senna S while taking oxycodone to prevent narcotic associated constipation.  Additionally, increase water intake (drink at least 8 glasses of water daily) and try adding fiber to the diet by eating fruits, vegetables and foods that are rich in grains. If constipation is experienced, contact the medical/primary care provider to discuss further treatment options.  * To avoid injury at home:  - continue use of rolling walker until cleared by physical therapist  - have family or friend remove all throw rug or objects in hallways that may present a trip hazard.  - if you experience any dizziness or medical concerns, call your medical doctor or  911.  * The implant may activate metal detection devices.  The patient will be seen in the office between 2-3 weeks for wound check.   **Your first post-operative visit has been scheduled prior to your admission. PLEASE CONTACT OFFICE TO CONFIRM THE APPOINTMENT DATE. Sutures/Staples/Tape will be removed at that time.  **  The silver based dressing is to be removed 7 days from the date of surgery.   ** CONTACT THE OFFICE IF THE FOLLOWING DEVELOP:  - the dressing becomes soiled or saturated  - you develop a fever greater that 101F  - the wound becomes red or you develop blistering around the wound  * Patient may shower after post-op day #3.   * The patient will continue home PT consistent with posterior total hip replacement protocol and will continue to practice posterior total hip precautions for a minimum of 6 week. Transition to outpatient PT will occur at the time of the first office visit.   * The patient is FULL weight bearing.  * The patient will continue ELIQUIS for 4 weeks after surgery for blood clot prevention, then ASA for next 2 weeks for blood clot prevention.   *** While on aspirin, the patient will take daily omeprazole or other similar medication to protect the stomach from irritation.   * The patient will take OXYCODONE AND TYLENOL for pain control and adjust according to prescription and patient needs. Contact the office if pain increases while taking prescribed pain medications or related concerns develop.  * The patient will take Senna S while taking oxycodone to prevent narcotic associated constipation.  Additionally, increase water intake (drink at least 8 glasses of water daily) and try adding fiber to the diet by eating fruits, vegetables and foods that are rich in grains. If constipation is experienced, contact the medical/primary care provider to discuss further treatment options.  * To avoid injury at home:  - continue use of rolling walker until cleared by physical therapist  - have family or friend remove all throw rug or objects in hallways that may present a trip hazard.  - if you experience any dizziness or medical concerns, call your medical doctor or  911.  * The implant may activate metal detection devices.   Follow up with urology this week

## 2023-03-24 NOTE — PHYSICAL THERAPY INITIAL EVALUATION ADULT - RANGE OF MOTION EXAMINATION, REHAB EVAL
except right hip n/a/bilateral upper extremity ROM was WFL (within functional limits)/bilateral lower extremity ROM was WFL (within functional limits)

## 2023-03-24 NOTE — CONSULT NOTE ADULT - SUBJECTIVE AND OBJECTIVE BOX
HPI:  89 y/o male with PMH HTN, COPD, anemia, VLADIMIR with no tx, stage 4 kidney failure, precancerous polyp in colon s/p colectomy and colostomy, Ulcerative colitis, HTN, GERD with  pain to right hip pain progressively worse the past 18 months. Described pain as sharp with radiation to RLE. Reports completed cortisone injections previously with the last 12/2022 with no relief. His  pain is constant. Now S/P  right hip total joint replacement 3/24/2023.      PAST MEDICAL & SURGICAL HISTORY:  HTN (hypertension)      COPD (chronic obstructive pulmonary disease)      Chronic kidney failure, stage 4 (severe)      H/O ulcerative colitis      GERD (gastroesophageal reflux disease)      Previous back surgery      History of cholecystectomy      H/O colectomy      S/P cataract surgery          MEDICATIONS  (STANDING):  acetaminophen     Tablet .. 975 milliGRAM(s) Oral every 8 hours  acetaminophen   IVPB .. 1000 milliGRAM(s) IV Intermittent once  ceFAZolin  Injectable. 2000 milliGRAM(s) IV Push <User Schedule>  pantoprazole    Tablet 40 milliGRAM(s) Oral before breakfast  polyethylene glycol 3350 17 Gram(s) Oral at bedtime  senna 2 Tablet(s) Oral at bedtime  sodium chloride 0.9%. 1000 milliLiter(s) (75 mL/Hr) IV Continuous <Continuous>    MEDICATIONS  (PRN):  magnesium hydroxide Suspension 30 milliLiter(s) Oral daily PRN Constipation  ondansetron Injectable 4 milliGRAM(s) IV Push every 6 hours PRN Nausea and/or Vomiting      Allergies    No Known Allergies    Intolerances        SOCIAL HISTORY:  Former smoker  Denies ETOH/IVDA    FAMILY HISTORY:  FH: stroke (Father)    FH: heart disease (Mother)    FH: diabetes mellitus (Mother)        Vital Signs Last 24 Hrs  T(C): 36.9 (24 Mar 2023 06:14), Max: 36.9 (24 Mar 2023 06:14)  T(F): 98.4 (24 Mar 2023 06:14), Max: 98.4 (24 Mar 2023 06:14)  HR: 67 (24 Mar 2023 06:14) (67 - 67)  BP: 133/52 (24 Mar 2023 06:14) (133/52 - 133/52)  BP(mean): --  RR: 16 (24 Mar 2023 06:14) (16 - 16)  SpO2: 100% (24 Mar 2023 06:14) (100% - 100%)        LABS:                        11.1   9.14  )-----------( 162      ( 24 Mar 2023 06:00 )             34.0     03-24    x   |  x   |  x   ----------------------------<  x   4.1   |  x   |  x                 RADIOLOGY & ADDITIONAL STUDIES: HPI:  89 y/o male with PMH HTN, COPD, anemia, VLADIMIR with no tx, stage 4 kidney failure ( not on HD), precancerous polyp in colon s/p colectomy and ileostostomy, Ulcerative colitis, HTN, GERD with  pain to right hip pain progressively worse the past 18 months. Described pain as sharp with radiation to RLE. Reports completed cortisone injections previously with the last 12/2022 with no relief. His  pain is constant. Now S/P  right hip total joint replacement 3/24/2023. Patient seen and examined in PACU, POD#0. Denies chest pain, SOB, dizziness, lightheadedness. Able to move lower extremities. Ileostomy functioning.       PAST MEDICAL & SURGICAL HISTORY:  HTN (hypertension)      COPD (chronic obstructive pulmonary disease)      Chronic kidney failure, stage 4 (severe)      H/O ulcerative colitis      GERD (gastroesophageal reflux disease)      Previous back surgery      History of cholecystectomy      H/O colectomy      S/P cataract surgery          MEDICATIONS  (STANDING):  acetaminophen     Tablet .. 975 milliGRAM(s) Oral every 8 hours  acetaminophen   IVPB .. 1000 milliGRAM(s) IV Intermittent once  ceFAZolin  Injectable. 2000 milliGRAM(s) IV Push <User Schedule>  pantoprazole    Tablet 40 milliGRAM(s) Oral before breakfast  polyethylene glycol 3350 17 Gram(s) Oral at bedtime  senna 2 Tablet(s) Oral at bedtime  sodium chloride 0.9%. 1000 milliLiter(s) (75 mL/Hr) IV Continuous <Continuous>    MEDICATIONS  (PRN):  magnesium hydroxide Suspension 30 milliLiter(s) Oral daily PRN Constipation  ondansetron Injectable 4 milliGRAM(s) IV Push every 6 hours PRN Nausea and/or Vomiting      Allergies    No Known Allergies    Intolerances        SOCIAL HISTORY:  Former smoker  Denies ETOH/IVDA    FAMILY HISTORY:  FH: stroke (Father)    FH: heart disease (Mother)    FH: diabetes mellitus (Mother)        Vital Signs Last 24 Hrs  T(C): 36.9 (24 Mar 2023 06:14), Max: 36.9 (24 Mar 2023 06:14)  T(F): 98.4 (24 Mar 2023 06:14), Max: 98.4 (24 Mar 2023 06:14)  HR: 67 (24 Mar 2023 06:14) (67 - 67)  BP: 133/52 (24 Mar 2023 06:14) (133/52 - 133/52)  BP(mean): --  RR: 16 (24 Mar 2023 06:14) (16 - 16)  SpO2: 100% (24 Mar 2023 06:14) (100% - 100%)    ROS:  Constitutional, Eyes, ENT, Cardiovascular, Respiratory, Gastrointestinal, Genitourinary, Musculoskeletal, Neurological, Integumentary, Psychiatric, Endocrine, Heme/Lymph are otherwise negative.    PHYSICAL EXAM:    General: Well developed; elderly, in no acute distress  Eyes: PERRLA, EOMI; conjunctiva and sclera clear  Head: Normocephalic; atraumatic, [place]  ENMT: No nasal discharge; airway clear  Neck: Supple; non tender; no JVD  Respiratory: No wheezes, rales or rhonchi  Cardiovascular: Regular rate and rhythm. S1 and S2 Normal; No murmurs, gallops or rubs  Gastrointestinal: Soft non-tender non-distended; Normal bowel sounds  Extremities: Right hip dressing C/D/I.  No clubbing, cyanosis or edema  Vascular: Peripheral pulses palpable 2+ bilaterally  Neurological: Alert and oriented x4  Skin: Warm and dry. No acute rash  Psychiatric: Cooperative and appropriate          LABS:                        11.1   9.14  )-----------( 162      ( 24 Mar 2023 06:00 )             34.0     03-24    x   |  x   |  x   ----------------------------<  x   4.1   |  x   |  x                 RADIOLOGY & ADDITIONAL STUDIES: HPI:  88 y/o male with PMH HTN, COPD, anemia, VLADIMIR with no tx, stage 4 kidney failure ( not on HD), precancerous polyp in colon s/p colectomy and ileostostomy, Ulcerative colitis, HTN, GERD with  pain to right hip pain progressively worse the past 18 months. Described pain as sharp with radiation to RLE. Reports completed cortisone injections previously with the last 12/2022 with no relief. His  pain is constant. Now S/P  right hip total joint replacement 3/24/2023. Patient seen and examined in PACU, POD#0. Denies chest pain, SOB, dizziness, lightheadedness. Able to move lower extremities. Ileostomy functioning.       PAST MEDICAL & SURGICAL HISTORY:  HTN (hypertension)      COPD (chronic obstructive pulmonary disease)      Chronic kidney failure, stage 4 (severe)      H/O ulcerative colitis      GERD (gastroesophageal reflux disease)      Previous back surgery      History of cholecystectomy      H/O colectomy      S/P cataract surgery          MEDICATIONS  (STANDING):  acetaminophen     Tablet .. 975 milliGRAM(s) Oral every 8 hours  acetaminophen   IVPB .. 1000 milliGRAM(s) IV Intermittent once  ceFAZolin  Injectable. 2000 milliGRAM(s) IV Push <User Schedule>  pantoprazole    Tablet 40 milliGRAM(s) Oral before breakfast  polyethylene glycol 3350 17 Gram(s) Oral at bedtime  senna 2 Tablet(s) Oral at bedtime  sodium chloride 0.9%. 1000 milliLiter(s) (75 mL/Hr) IV Continuous <Continuous>    MEDICATIONS  (PRN):  magnesium hydroxide Suspension 30 milliLiter(s) Oral daily PRN Constipation  ondansetron Injectable 4 milliGRAM(s) IV Push every 6 hours PRN Nausea and/or Vomiting      Allergies    No Known Allergies    Intolerances        SOCIAL HISTORY:  Former smoker  Denies ETOH/IVDA    FAMILY HISTORY:  FH: stroke (Father)    FH: heart disease (Mother)    FH: diabetes mellitus (Mother)        Vital Signs Last 24 Hrs  T(C): 36.9 (24 Mar 2023 06:14), Max: 36.9 (24 Mar 2023 06:14)  T(F): 98.4 (24 Mar 2023 06:14), Max: 98.4 (24 Mar 2023 06:14)  HR: 67 (24 Mar 2023 06:14) (67 - 67)  BP: 133/52 (24 Mar 2023 06:14) (133/52 - 133/52)  BP(mean): --  RR: 16 (24 Mar 2023 06:14) (16 - 16)  SpO2: 100% (24 Mar 2023 06:14) (100% - 100%)    ROS:  Constitutional, Eyes, ENT, Cardiovascular, Respiratory, Gastrointestinal, Genitourinary, Musculoskeletal, Neurological, Integumentary, Psychiatric, Endocrine, Heme/Lymph are otherwise negative.    PHYSICAL EXAM:    General: Well developed; elderly, in no acute distress  Eyes: PERRLA, EOMI; conjunctiva and sclera clear  Head: Normocephalic; atraumatic, [place]  ENMT: No nasal discharge; airway clear  Neck: Supple; non tender; no JVD  Respiratory: No wheezes, rales or rhonchi  Cardiovascular: Regular rate and rhythm. S1 and S2 Normal; No murmurs, gallops or rubs  Gastrointestinal: Soft non-tender non-distended; Normal bowel sounds  Extremities: Right hip dressing C/D/I.  No clubbing, cyanosis or edema  Vascular: Peripheral pulses palpable 2+ bilaterally  Neurological: Alert and oriented x4  Skin: Warm and dry. No acute rash  Psychiatric: Cooperative and appropriate          LABS:                        11.1   9.14  )-----------( 162      ( 24 Mar 2023 06:00 )             34.0     03-24    x   |  x   |  x   ----------------------------<  x   4.1   |  x   |  x         RADIOLOGY & ADDITIONAL STUDIES:      Anoro Ellipta 62.5 mcg-25 mcg/inh inhalation powder: 1 puff(s) inhaled once a day (24 Mar 2023 11:18)  furosemide 40 mg oral tablet: 1 tab(s) orally once a day (24 Mar 2023 11:18)  Nebivolol 2.5 mg oral tablet: 1 tab(s) orally once a day (24 Mar 2023 11:18)  sodium bicarbonate 650 mg oral tablet: 1 tab(s) orally 4 times a day (24 Mar 2023 11:18)  sodium citrate-citric acid 500 mg-334 mg/5 mL oral solution: 15 milliliter(s) orally 4 times a day (after meals and at bedtime) (24 Mar 2023 11:18)  tamsulosin 0.4 mg oral capsule: 1 cap(s) orally once a day (24 Mar 2023 11:18)  Tylenol: 650 milligram(s) orally every 6 hours, As Needed (24 Mar 2023 11:18)  Vitamin D3 25 mcg (1000 intl units) oral tablet: 1 tab(s) orally once a day (24 Mar 2023 11:18)

## 2023-03-24 NOTE — CONSULT NOTE ADULT - ASSESSMENT
89 y/o male with PMH HTN, COPD, anemia, VLADIMIR with no tx, stage 4 kidney failure, precancerous polyp in colon s/p colectomy and colostomy, Ulcerative colitis, HTN, GERD with  pain to right hip pain progressively worse the past 18 months. Described pain as sharp with radiation to RLE. Reports completed cortisone injections previously with the last 12/2022 with no relief. His  pain is constant. Now S/P  right hip total joint replacement 3/24/2023.

## 2023-03-24 NOTE — CONSULT NOTE ADULT - PROBLEM SELECTOR RECOMMENDATION 2
follows with Dr. Mtz  change Protonix to H2 blocker  avoid NSAID's  Monitor BMP  Bladder scan q 6 hrs

## 2023-03-24 NOTE — PHYSICAL THERAPY INITIAL EVALUATION ADULT - ADDITIONAL COMMENTS
Pt lives with wife and sons in a 1 story house with 4 steps to enter. Modified Independent with all PTA, with rollator. Pt has a rollator at home.

## 2023-03-24 NOTE — DISCHARGE NOTE PROVIDER - HOSPITAL COURSE
The patient underwent a RIGHT POSTERIOR TOTAL HIP REPLACEMENT on 3/24/23. The patient received antibiotics consistent with SCIP guidelines. The patient underwent the procedure and had no intra-operative complications. Post-operatively, the patient was seen by medicine and PT. The patient received ELIQUIS for DVTP. The patient received pain medications per orthopedic pain management pathway and the pain was appropriately controlled. Patient was instructed on posterior total hip precautions by PT. The patient did not have any post-operative medical complications. The patient was discharged in stable condition. The patient underwent a RIGHT POSTERIOR TOTAL HIP REPLACEMENT on 3/24/23. The patient received antibiotics consistent with SCIP guidelines. The patient underwent the procedure and had no intra-operative complications. Post-operatively, the patient was seen by medicine and PT. The patient received ELIQUIS for DVTP. The patient received pain medications per orthopedic pain management pathway and the pain was appropriately controlled. Patient was instructed on posterior total hip precautions by PT. The patient did have urinary retention requiring a clement which will be left in place until follow up. The patient was discharged in stable condition.

## 2023-03-25 LAB
ANION GAP SERPL CALC-SCNC: 12 MMOL/L — SIGNIFICANT CHANGE UP (ref 5–17)
BUN SERPL-MCNC: 57.4 MG/DL — HIGH (ref 8–20)
CALCIUM SERPL-MCNC: 9.1 MG/DL — SIGNIFICANT CHANGE UP (ref 8.4–10.5)
CHLORIDE SERPL-SCNC: 106 MMOL/L — SIGNIFICANT CHANGE UP (ref 96–108)
CO2 SERPL-SCNC: 18 MMOL/L — LOW (ref 22–29)
CREAT SERPL-MCNC: 7.17 MG/DL — HIGH (ref 0.5–1.3)
EGFR: 7 ML/MIN/1.73M2 — LOW
GLUCOSE SERPL-MCNC: 123 MG/DL — HIGH (ref 70–99)
HCT VFR BLD CALC: 26.9 % — LOW (ref 39–50)
HGB BLD-MCNC: 8.5 G/DL — LOW (ref 13–17)
MCHC RBC-ENTMCNC: 31.6 GM/DL — LOW (ref 32–36)
MCHC RBC-ENTMCNC: 31.6 PG — SIGNIFICANT CHANGE UP (ref 27–34)
MCV RBC AUTO: 100 FL — SIGNIFICANT CHANGE UP (ref 80–100)
PLATELET # BLD AUTO: 125 K/UL — LOW (ref 150–400)
POTASSIUM SERPL-MCNC: 4.9 MMOL/L — SIGNIFICANT CHANGE UP (ref 3.5–5.3)
POTASSIUM SERPL-SCNC: 4.9 MMOL/L — SIGNIFICANT CHANGE UP (ref 3.5–5.3)
RBC # BLD: 2.69 M/UL — LOW (ref 4.2–5.8)
RBC # FLD: 14 % — SIGNIFICANT CHANGE UP (ref 10.3–14.5)
SODIUM SERPL-SCNC: 136 MMOL/L — SIGNIFICANT CHANGE UP (ref 135–145)
WBC # BLD: 8.68 K/UL — SIGNIFICANT CHANGE UP (ref 3.8–10.5)
WBC # FLD AUTO: 8.68 K/UL — SIGNIFICANT CHANGE UP (ref 3.8–10.5)

## 2023-03-25 PROCEDURE — 99232 SBSQ HOSP IP/OBS MODERATE 35: CPT

## 2023-03-25 RX ORDER — APIXABAN 2.5 MG/1
1 TABLET, FILM COATED ORAL
Qty: 0 | Refills: 0 | DISCHARGE
Start: 2023-03-25 | End: 2023-04-22

## 2023-03-25 RX ORDER — LIDOCAINE HCL 20 MG/ML
5 VIAL (ML) INJECTION
Refills: 0 | Status: DISCONTINUED | OUTPATIENT
Start: 2023-03-25 | End: 2023-03-27

## 2023-03-25 RX ADMIN — Medication 975 MILLIGRAM(S): at 06:30

## 2023-03-25 RX ADMIN — Medication 650 MILLIGRAM(S): at 15:05

## 2023-03-25 RX ADMIN — APIXABAN 2.5 MILLIGRAM(S): 2.5 TABLET, FILM COATED ORAL at 06:30

## 2023-03-25 RX ADMIN — SENNA PLUS 2 TABLET(S): 8.6 TABLET ORAL at 23:11

## 2023-03-25 RX ADMIN — POLYETHYLENE GLYCOL 3350 17 GRAM(S): 17 POWDER, FOR SOLUTION ORAL at 23:11

## 2023-03-25 RX ADMIN — Medication 650 MILLIGRAM(S): at 06:30

## 2023-03-25 RX ADMIN — Medication 5 MILLILITER(S): at 14:10

## 2023-03-25 RX ADMIN — TRAMADOL HYDROCHLORIDE 50 MILLIGRAM(S): 50 TABLET ORAL at 15:05

## 2023-03-25 RX ADMIN — FAMOTIDINE 20 MILLIGRAM(S): 10 INJECTION INTRAVENOUS at 15:05

## 2023-03-25 RX ADMIN — Medication 650 MILLIGRAM(S): at 17:58

## 2023-03-25 RX ADMIN — Medication 975 MILLIGRAM(S): at 15:04

## 2023-03-25 RX ADMIN — TAMSULOSIN HYDROCHLORIDE 0.4 MILLIGRAM(S): 0.4 CAPSULE ORAL at 23:11

## 2023-03-25 RX ADMIN — APIXABAN 2.5 MILLIGRAM(S): 2.5 TABLET, FILM COATED ORAL at 17:38

## 2023-03-25 RX ADMIN — TRAMADOL HYDROCHLORIDE 50 MILLIGRAM(S): 50 TABLET ORAL at 11:31

## 2023-03-25 RX ADMIN — TRAMADOL HYDROCHLORIDE 50 MILLIGRAM(S): 50 TABLET ORAL at 16:00

## 2023-03-25 RX ADMIN — Medication 975 MILLIGRAM(S): at 23:10

## 2023-03-25 RX ADMIN — Medication 15 MILLILITER(S): at 15:05

## 2023-03-25 RX ADMIN — TRAMADOL HYDROCHLORIDE 50 MILLIGRAM(S): 50 TABLET ORAL at 12:15

## 2023-03-25 RX ADMIN — Medication 15 MILLILITER(S): at 06:00

## 2023-03-25 RX ADMIN — Medication 975 MILLIGRAM(S): at 16:00

## 2023-03-25 RX ADMIN — Medication 650 MILLIGRAM(S): at 23:11

## 2023-03-25 RX ADMIN — Medication 15 MILLILITER(S): at 23:11

## 2023-03-25 NOTE — PROGRESS NOTE ADULT - ASSESSMENT
89 y/o male with Hx of HTN, COPD, anemia, VLADIMIR with no tx, stage 4 kidney failure, precancerous polyp in colon s/p colectomy and colostomy, Ulcerative colitis, HTN, GERD with  pain to right hip pain progressively worse the past 18 months, completed cortisone injections previously with the last 12/2022 with no relief, came in here for elective right hip total joint replacement 3/24/2023 s/p procedure.     Plan:       Unilateral primary osteoarthritis, right hip S/P right hip surgery  Post op ABX as per SCIP  DVT ppx/Pain control/PT as per Ortho  IS  Wound care / WB status -as per ortho.    Acute on chronic Stage 4 chronic kidney disease: follows with Dr. Mtz  change Protonix to H2 blocker  avoid NSAID's  Monitor BMP  Creatinine went from 6.5 to 7.1, will continue monitoring BMP     Urine retention: s/p foleys placement, he has Hx of BPH, on tamsulosin might need to follow with Urology      H/O ulcerative colitis: Patient has ileostomy- ileostomy care   Monitor output.    HTN (hypertension): Patient on Lasix and Nebivolol ( called pharmacy to add)  Hold Lasix and resume as clinically indicated.    VLADIMIR (obstructive sleep apnea): Not on CPAP  Follows with Dr. Sylvia MCNAIR.      Need for prophylactic measure.   : DVT ppx  GI: Pepcid  Bowel rX for opioid induced constipation ppx.

## 2023-03-26 LAB
ANION GAP SERPL CALC-SCNC: 15 MMOL/L — SIGNIFICANT CHANGE UP (ref 5–17)
BUN SERPL-MCNC: 58.4 MG/DL — HIGH (ref 8–20)
CALCIUM SERPL-MCNC: 9.1 MG/DL — SIGNIFICANT CHANGE UP (ref 8.4–10.5)
CHLORIDE SERPL-SCNC: 104 MMOL/L — SIGNIFICANT CHANGE UP (ref 96–108)
CO2 SERPL-SCNC: 18 MMOL/L — LOW (ref 22–29)
CREAT SERPL-MCNC: 7.19 MG/DL — HIGH (ref 0.5–1.3)
EGFR: 7 ML/MIN/1.73M2 — LOW
GLUCOSE SERPL-MCNC: 116 MG/DL — HIGH (ref 70–99)
HCT VFR BLD CALC: 26.8 % — LOW (ref 39–50)
HGB BLD-MCNC: 8.5 G/DL — LOW (ref 13–17)
MCHC RBC-ENTMCNC: 31.1 PG — SIGNIFICANT CHANGE UP (ref 27–34)
MCHC RBC-ENTMCNC: 31.7 GM/DL — LOW (ref 32–36)
MCV RBC AUTO: 98.2 FL — SIGNIFICANT CHANGE UP (ref 80–100)
PLATELET # BLD AUTO: 124 K/UL — LOW (ref 150–400)
POTASSIUM SERPL-MCNC: 4.7 MMOL/L — SIGNIFICANT CHANGE UP (ref 3.5–5.3)
POTASSIUM SERPL-SCNC: 4.7 MMOL/L — SIGNIFICANT CHANGE UP (ref 3.5–5.3)
RBC # BLD: 2.73 M/UL — LOW (ref 4.2–5.8)
RBC # FLD: 14.4 % — SIGNIFICANT CHANGE UP (ref 10.3–14.5)
SODIUM SERPL-SCNC: 137 MMOL/L — SIGNIFICANT CHANGE UP (ref 135–145)
WBC # BLD: 8.56 K/UL — SIGNIFICANT CHANGE UP (ref 3.8–10.5)
WBC # FLD AUTO: 8.56 K/UL — SIGNIFICANT CHANGE UP (ref 3.8–10.5)

## 2023-03-26 PROCEDURE — 99232 SBSQ HOSP IP/OBS MODERATE 35: CPT

## 2023-03-26 RX ADMIN — APIXABAN 2.5 MILLIGRAM(S): 2.5 TABLET, FILM COATED ORAL at 05:18

## 2023-03-26 RX ADMIN — Medication 975 MILLIGRAM(S): at 06:15

## 2023-03-26 RX ADMIN — TRAMADOL HYDROCHLORIDE 50 MILLIGRAM(S): 50 TABLET ORAL at 19:24

## 2023-03-26 RX ADMIN — Medication 975 MILLIGRAM(S): at 13:34

## 2023-03-26 RX ADMIN — Medication 15 MILLILITER(S): at 13:34

## 2023-03-26 RX ADMIN — APIXABAN 2.5 MILLIGRAM(S): 2.5 TABLET, FILM COATED ORAL at 18:31

## 2023-03-26 RX ADMIN — TRAMADOL HYDROCHLORIDE 50 MILLIGRAM(S): 50 TABLET ORAL at 12:15

## 2023-03-26 RX ADMIN — Medication 650 MILLIGRAM(S): at 05:19

## 2023-03-26 RX ADMIN — Medication 650 MILLIGRAM(S): at 18:31

## 2023-03-26 RX ADMIN — Medication 5 MILLILITER(S): at 05:18

## 2023-03-26 RX ADMIN — Medication 975 MILLIGRAM(S): at 00:10

## 2023-03-26 RX ADMIN — Medication 650 MILLIGRAM(S): at 13:33

## 2023-03-26 RX ADMIN — Medication 975 MILLIGRAM(S): at 14:30

## 2023-03-26 RX ADMIN — Medication 975 MILLIGRAM(S): at 22:20

## 2023-03-26 RX ADMIN — Medication 975 MILLIGRAM(S): at 05:15

## 2023-03-26 RX ADMIN — TRAMADOL HYDROCHLORIDE 50 MILLIGRAM(S): 50 TABLET ORAL at 11:16

## 2023-03-26 RX ADMIN — Medication 15 MILLILITER(S): at 05:18

## 2023-03-26 RX ADMIN — TRAMADOL HYDROCHLORIDE 50 MILLIGRAM(S): 50 TABLET ORAL at 18:35

## 2023-03-26 RX ADMIN — Medication 5 MILLILITER(S): at 19:24

## 2023-03-26 NOTE — PROGRESS NOTE ADULT - ASSESSMENT
89 y/o male with Hx of HTN, COPD, anemia, VLADIMIR with no tx, stage 4 kidney failure, precancerous polyp in colon s/p colectomy and colostomy, Ulcerative colitis, HTN, GERD with  pain to right hip pain progressively worse the past 18 months, completed cortisone injections previously with the last 12/2022 with no relief, came in here for elective right hip total joint replacement 3/24/2023 s/p procedure.     Plan:       Unilateral primary osteoarthritis, right hip S/P right hip surgery  Post op ABX as per SCIP  DVT ppx/Pain control/PT as per Ortho  IS  Wound care / WB status -as per ortho.    Acute on chronic Stage 4 chronic kidney disease: follows with Dr. Mtz  change Protonix to H2 blocker  avoid NSAID's  Monitor BMP  Creatinine went from 6.5 to 7.1, will continue monitoring BMP   creatinine is stable, would recommend BMP to be checked in 1 week and follow up with Nephrology in 1 week as well.      Urine retention: s/p foleys placement, he has Hx of BPH, on tamsulosin might need to follow with Urology, would do voiding trial today      H/O ulcerative colitis: Patient has ileostomy- ileostomy care   Monitor output.    HTN (hypertension): Patient on Lasix and Nebivolol ( called pharmacy to add)  Hold Lasix and resume as clinically indicated.    VLADIMIR (obstructive sleep apnea): Not on CPAP  Follows with Dr. Sylvia MCNAIR.      Need for prophylactic measure.    DVT ppx  GI: Pepcid  Bowel rX for opioid induced constipation ppx.    Voiding trial, if fails will put clement's cathter in and have him follow with Urology.   Patient is stable from the medicine point of view for discharge pending PT and Ortho eval.

## 2023-03-27 ENCOUNTER — TRANSCRIPTION ENCOUNTER (OUTPATIENT)
Age: 88
End: 2023-03-27

## 2023-03-27 VITALS
TEMPERATURE: 98 F | SYSTOLIC BLOOD PRESSURE: 116 MMHG | RESPIRATION RATE: 17 BRPM | HEART RATE: 68 BPM | DIASTOLIC BLOOD PRESSURE: 60 MMHG | OXYGEN SATURATION: 98 %

## 2023-03-27 LAB — SARS-COV-2 RNA SPEC QL NAA+PROBE: SIGNIFICANT CHANGE UP

## 2023-03-27 PROCEDURE — 73501 X-RAY EXAM HIP UNI 1 VIEW: CPT

## 2023-03-27 PROCEDURE — 85025 COMPLETE CBC W/AUTO DIFF WBC: CPT

## 2023-03-27 PROCEDURE — 99232 SBSQ HOSP IP/OBS MODERATE 35: CPT

## 2023-03-27 PROCEDURE — 80048 BASIC METABOLIC PNL TOTAL CA: CPT

## 2023-03-27 PROCEDURE — U0003: CPT

## 2023-03-27 PROCEDURE — U0005: CPT

## 2023-03-27 PROCEDURE — 97110 THERAPEUTIC EXERCISES: CPT

## 2023-03-27 PROCEDURE — 36415 COLL VENOUS BLD VENIPUNCTURE: CPT

## 2023-03-27 PROCEDURE — C1776: CPT

## 2023-03-27 PROCEDURE — 85027 COMPLETE CBC AUTOMATED: CPT

## 2023-03-27 PROCEDURE — C1713: CPT

## 2023-03-27 PROCEDURE — 97116 GAIT TRAINING THERAPY: CPT

## 2023-03-27 PROCEDURE — 84132 ASSAY OF SERUM POTASSIUM: CPT

## 2023-03-27 RX ORDER — SENNA PLUS 8.6 MG/1
2 TABLET ORAL
Qty: 0 | Refills: 0 | DISCHARGE
Start: 2023-03-27

## 2023-03-27 RX ORDER — OXYCODONE HYDROCHLORIDE 5 MG/1
1 TABLET ORAL
Qty: 0 | Refills: 0 | DISCHARGE
Start: 2023-03-27

## 2023-03-27 RX ORDER — ACETAMINOPHEN 500 MG
3 TABLET ORAL
Qty: 0 | Refills: 0 | DISCHARGE
Start: 2023-03-27

## 2023-03-27 RX ORDER — ACETAMINOPHEN 500 MG
650 TABLET ORAL
Qty: 0 | Refills: 0 | DISCHARGE

## 2023-03-27 RX ADMIN — Medication 650 MILLIGRAM(S): at 06:11

## 2023-03-27 RX ADMIN — Medication 15 MILLILITER(S): at 12:05

## 2023-03-27 RX ADMIN — TRAMADOL HYDROCHLORIDE 50 MILLIGRAM(S): 50 TABLET ORAL at 12:04

## 2023-03-27 RX ADMIN — FAMOTIDINE 20 MILLIGRAM(S): 10 INJECTION INTRAVENOUS at 12:01

## 2023-03-27 RX ADMIN — APIXABAN 2.5 MILLIGRAM(S): 2.5 TABLET, FILM COATED ORAL at 06:11

## 2023-03-27 RX ADMIN — Medication 975 MILLIGRAM(S): at 06:10

## 2023-03-27 RX ADMIN — TRAMADOL HYDROCHLORIDE 50 MILLIGRAM(S): 50 TABLET ORAL at 13:04

## 2023-03-27 RX ADMIN — TRAMADOL HYDROCHLORIDE 50 MILLIGRAM(S): 50 TABLET ORAL at 05:54

## 2023-03-27 RX ADMIN — NEBIVOLOL HYDROCHLORIDE 2.5 MILLIGRAM(S): 5 TABLET ORAL at 06:11

## 2023-03-27 RX ADMIN — TAMSULOSIN HYDROCHLORIDE 0.4 MILLIGRAM(S): 0.4 CAPSULE ORAL at 00:39

## 2023-03-27 RX ADMIN — Medication 650 MILLIGRAM(S): at 12:01

## 2023-03-27 RX ADMIN — Medication 5 MILLILITER(S): at 05:55

## 2023-03-27 RX ADMIN — Medication 650 MILLIGRAM(S): at 00:40

## 2023-03-27 NOTE — PROGRESS NOTE ADULT - SUBJECTIVE AND OBJECTIVE BOX
MALENA GALINAMICHELELINSEY    [id number]    History: Patient is status post right posterior total hip arthroplasty on 3/24/2023, pod #1. Patient is doing well. The patient's pain is controlled using the prescribed pain medications. The patient is participating in physical therapy.  Pt had clement placed this am for urinary retention.  Denies nausea, vomiting, chest pain, shortness of breath, abdominal pain or fever. No new complaints.                              8.5    8.68  )-----------( 125      ( 25 Mar 2023 05:30 )             26.9     03-25    136  |  106  |  57.4<H>  ----------------------------<  123<H>  4.9   |  18.0<L>  |  7.17<H>    Ca    9.1      25 Mar 2023 05:30        MEDICATIONS  (STANDING):  acetaminophen     Tablet .. 975 milliGRAM(s) Oral every 8 hours  acetaminophen   IVPB .. 1000 milliGRAM(s) IV Intermittent once  apixaban 2.5 milliGRAM(s) Oral two times a day  ceFAZolin  Injectable. 2000 milliGRAM(s) IV Push <User Schedule>  citric acid/sodium citrate Solution 15 milliLiter(s) Oral four times a day  famotidine    Tablet 20 milliGRAM(s) Oral daily  nebivolol 2.5 milliGRAM(s) Oral daily  polyethylene glycol 3350 17 Gram(s) Oral at bedtime  senna 2 Tablet(s) Oral at bedtime  sodium bicarbonate 650 milliGRAM(s) Oral four times a day  sodium chloride 0.9%. 1000 milliLiter(s) (75 mL/Hr) IV Continuous <Continuous>  tamsulosin 0.4 milliGRAM(s) Oral at bedtime  umeclidinium 62.5 MICROgram(s)/vilanterol 25 MICROgram(s) Inhaler 1 Puff(s) Inhalation daily    MEDICATIONS  (PRN):  magnesium hydroxide Suspension 30 milliLiter(s) Oral daily PRN Constipation  ondansetron Injectable 4 milliGRAM(s) IV Push every 6 hours PRN Nausea and/or Vomiting  oxyCODONE    IR 5 milliGRAM(s) Oral every 3 hours PRN Severe Pain (7 - 10)  traMADol 25 milliGRAM(s) Oral every 3 hours PRN Mild Pain (1 - 3)  traMADol 50 milliGRAM(s) Oral every 3 hours PRN Moderate Pain (4 - 6)      Physical exam: The right hip dressing is clean, dry and intact. No drainage or discharge. No erythema is noted. No blistering. No ecchymosis. The calf is supple nontender.  No calf tenderness. Sensation to light touch is grossly intact distally. Motor function distally is 5/5. No foot drop. 2+ dorsalis pedis pulse. Capillary refill is less than 2 seconds. No cyanosis.    Primary Orthopedic Assessment:  • s/p RIGHT POSTERIOR total hip replacement pod #1    Plan:   Pt may be discharged with clement and follow up with urology this week  • DVT prophylaxis eliquis 2.5mg bid x 4 weeks then aspirin 81mg bid x 2 weeks including use of compression devices and ankle pumps  • Continue physical therapy  • Weightbearing as tolerated of the right lower extremity with assistance of a walker  • Incentive spirometry encouraged  • Pain control as clinically indicated  • Posterior hip precautions reviewed with patient  • Discharge planning – anticipated discharge is subacute rehabilitation   
MALENA STEIN    [id number]    89y      Male    Patient is a 89y old  Male who presents with a chief complaint of right hip surgery (24 Mar 2023 10:21)      INTERVAL HPI/OVERNIGHT EVENTS:    patient's pain is well controlled, he failed voiding trial, denies fever, chills, chest pain, he has no complains     REVIEW OF SYSTEMS:    CONSTITUTIONAL: No fever, fatigue  RESPIRATORY: No cough, No shortness of breath  CARDIOVASCULAR: No chest pain, palpitations  GASTROINTESTINAL: No abdominal, No nausea, vomiting  NEUROLOGICAL: No headaches,  loss of strength.  MISCELLANEOUS: Right hip is controlled        Vital Signs Last 24 Hrs  T(C): 36.8 (27 Mar 2023 05:52), Max: 36.8 (26 Mar 2023 16:14)  T(F): 98.2 (27 Mar 2023 05:52), Max: 98.2 (26 Mar 2023 16:14)  HR: 72 (27 Mar 2023 05:52) (68 - 73)  BP: 125/65 (27 Mar 2023 05:52) (111/55 - 126/65)  RR: 18 (27 Mar 2023 05:52) (18 - 18)  SpO2: 98% (27 Mar 2023 05:52) (92% - 98%)    Parameters below as of 27 Mar 2023 05:52  Patient On (Oxygen Delivery Method): room air        PHYSICAL EXAM:  GENERAL: Elderly male looking comfortable    HEENT: PERRL, +EOMI  NECK: soft, Supple, No JVD   CHEST/LUNG: Clear to auscultate bilaterally; No wheezing  HEART: S1S2+, Regular rate and rhythm; No murmurs  ABDOMEN: Soft, Nontender, Nondistended; Bowel sounds present, has ileostomy   EXTREMITIES:  1+ Peripheral Pulses, No edema, right hip with clean dressings on, no bleeding or soaking   SKIN: No rashes or lesions  NEURO: AAOX3  PSYCH: normal mood  has clement's catheter       LABS:                        8.5    8.56  )-----------( 124      ( 26 Mar 2023 08:46 )             26.8     03-26    137  |  104  |  58.4<H>  ----------------------------<  116<H>  4.7   |  18.0<L>  |  7.19<H>    Ca    9.1      26 Mar 2023 08:46              I&O's Summary    26 Mar 2023 07:01  -  27 Mar 2023 07:00  --------------------------------------------------------  IN: 480 mL / OUT: 1075 mL / NET: -595 mL        MEDICATIONS  (STANDING):  acetaminophen     Tablet .. 975 milliGRAM(s) Oral every 8 hours  acetaminophen   IVPB .. 1000 milliGRAM(s) IV Intermittent once  apixaban 2.5 milliGRAM(s) Oral two times a day  citric acid/sodium citrate Solution 15 milliLiter(s) Oral four times a day  famotidine    Tablet 20 milliGRAM(s) Oral daily  lidocaine 2% Jelly 5 milliLiter(s) IntraUrethral two times a day  nebivolol 2.5 milliGRAM(s) Oral daily  polyethylene glycol 3350 17 Gram(s) Oral at bedtime  senna 2 Tablet(s) Oral at bedtime  sodium bicarbonate 650 milliGRAM(s) Oral four times a day  sodium chloride 0.9%. 1000 milliLiter(s) (75 mL/Hr) IV Continuous <Continuous>  tamsulosin 0.4 milliGRAM(s) Oral at bedtime  umeclidinium 62.5 MICROgram(s)/vilanterol 25 MICROgram(s) Inhaler 1 Puff(s) Inhalation daily    MEDICATIONS  (PRN):  bisacodyl Suppository 10 milliGRAM(s) Rectal once PRN Constipation  magnesium hydroxide Suspension 30 milliLiter(s) Oral daily PRN Constipation  ondansetron Injectable 4 milliGRAM(s) IV Push every 6 hours PRN Nausea and/or Vomiting  oxyCODONE    IR 5 milliGRAM(s) Oral every 3 hours PRN Severe Pain (7 - 10)  traMADol 25 milliGRAM(s) Oral every 3 hours PRN Mild Pain (1 - 3)  traMADol 50 milliGRAM(s) Oral every 3 hours PRN Moderate Pain (4 - 6)        
MALENA STEIN    [id number]    89y      Male    Patient is a 89y old  Male who presents with a chief complaint of right hip surgery (24 Mar 2023 10:21)      INTERVAL HPI/OVERNIGHT EVENTS:    patient's pain is well controlled, he was having urine retention end up getting clement's cathter, denies fever, chills, chest pain    REVIEW OF SYSTEMS:    CONSTITUTIONAL: No fever, fatigue  RESPIRATORY: No cough, No shortness of breath  CARDIOVASCULAR: No chest pain, palpitations  GASTROINTESTINAL: No abdominal, No nausea, vomiting  NEUROLOGICAL: No headaches,  loss of strength.  MISCELLANEOUS: Right hip is controlled       Vital Signs Last 24 Hrs  T(C): 36.6 (25 Mar 2023 09:29), Max: 36.6 (25 Mar 2023 09:29)  T(F): 97.9 (25 Mar 2023 09:29), Max: 97.9 (25 Mar 2023 09:29)  HR: 77 (25 Mar 2023 09:29) (58 - 77)  BP: 95/50 (25 Mar 2023 09:29) (95/50 - 121/57)  RR: 16 (25 Mar 2023 09:29) (14 - 16)  SpO2: 96% (25 Mar 2023 09:29) (93% - 98%)    Parameters below as of 25 Mar 2023 09:29  Patient On (Oxygen Delivery Method): room air        PHYSICAL EXAM:    GENERAL: Elderly male looking comfortable    HEENT: PERRL, +EOMI  NECK: soft, Supple, No JVD,   CHEST/LUNG: Clear to auscultate bilaterally; No wheezing  HEART: S1S2+, Regular rate and rhythm; No murmurs  ABDOMEN: Soft, Nontender, Nondistended; Bowel sounds present  EXTREMITIES:  1+ Peripheral Pulses, No edema, right hip with clean dressings on, no bleeding or soaking   SKIN: No rashes or lesions  NEURO: AAOX3  PSYCH: normal mood      LABS:                        8.5    8.68  )-----------( 125      ( 25 Mar 2023 05:30 )             26.9     03-25    136  |  106  |  57.4<H>  ----------------------------<  123<H>  4.9   |  18.0<L>  |  7.17<H>    Ca    9.1      25 Mar 2023 05:30              I&O's Summary      24 Mar 2023 07:01  -  25 Mar 2023 07:00  --------------------------------------------------------  IN: 750 mL / OUT: 700 mL / NET: 50 mL        MEDICATIONS  (STANDING):  acetaminophen     Tablet .. 975 milliGRAM(s) Oral every 8 hours  acetaminophen   IVPB .. 1000 milliGRAM(s) IV Intermittent once  apixaban 2.5 milliGRAM(s) Oral two times a day  ceFAZolin  Injectable. 2000 milliGRAM(s) IV Push <User Schedule>  citric acid/sodium citrate Solution 15 milliLiter(s) Oral four times a day  famotidine    Tablet 20 milliGRAM(s) Oral daily  lidocaine 2% Jelly 5 milliLiter(s) IntraUrethral two times a day  nebivolol 2.5 milliGRAM(s) Oral daily  polyethylene glycol 3350 17 Gram(s) Oral at bedtime  senna 2 Tablet(s) Oral at bedtime  sodium bicarbonate 650 milliGRAM(s) Oral four times a day  sodium chloride 0.9%. 1000 milliLiter(s) (75 mL/Hr) IV Continuous <Continuous>  tamsulosin 0.4 milliGRAM(s) Oral at bedtime  umeclidinium 62.5 MICROgram(s)/vilanterol 25 MICROgram(s) Inhaler 1 Puff(s) Inhalation daily    MEDICATIONS  (PRN):  magnesium hydroxide Suspension 30 milliLiter(s) Oral daily PRN Constipation  ondansetron Injectable 4 milliGRAM(s) IV Push every 6 hours PRN Nausea and/or Vomiting  oxyCODONE    IR 5 milliGRAM(s) Oral every 3 hours PRN Severe Pain (7 - 10)  traMADol 25 milliGRAM(s) Oral every 3 hours PRN Mild Pain (1 - 3)  traMADol 50 milliGRAM(s) Oral every 3 hours PRN Moderate Pain (4 - 6)        
MALENA STEIN    [id number]    History: Patient is a 89 year old male status post right posterior total hip arthroplasty on 3/24/23 POD 3. Patient is doing well. The patient's pain is controlled using the prescribed pain medications. The patient is participating in physical therapy. Denies nausea, vomiting, chest pain, shortness of breath, abdominal pain or fever. Patient had retention again this am, clement insterted    Vital Signs Last 24 Hrs  T(C): 36.8 (27 Mar 2023 05:52), Max: 36.8 (26 Mar 2023 16:14)  T(F): 98.2 (27 Mar 2023 05:52), Max: 98.2 (26 Mar 2023 16:14)  HR: 72 (27 Mar 2023 05:52) (63 - 73)  BP: 125/65 (27 Mar 2023 05:52) (109/50 - 126/65)  BP(mean): --  RR: 18 (27 Mar 2023 05:52) (14 - 18)  SpO2: 98% (27 Mar 2023 05:52) (92% - 98%)    Parameters below as of 27 Mar 2023 05:52  Patient On (Oxygen Delivery Method): room air                              8.5    8.56  )-----------( 124      ( 26 Mar 2023 08:46 )             26.8       03-26    137  |  104  |  58.4<H>  ----------------------------<  116<H>  4.7   |  18.0<L>  |  7.19<H>    Ca    9.1      26 Mar 2023 08:46        MEDICATIONS  (STANDING):  acetaminophen     Tablet .. 975 milliGRAM(s) Oral every 8 hours  acetaminophen   IVPB .. 1000 milliGRAM(s) IV Intermittent once  apixaban 2.5 milliGRAM(s) Oral two times a day  citric acid/sodium citrate Solution 15 milliLiter(s) Oral four times a day  famotidine    Tablet 20 milliGRAM(s) Oral daily  lidocaine 2% Jelly 5 milliLiter(s) IntraUrethral two times a day  nebivolol 2.5 milliGRAM(s) Oral daily  polyethylene glycol 3350 17 Gram(s) Oral at bedtime  senna 2 Tablet(s) Oral at bedtime  sodium bicarbonate 650 milliGRAM(s) Oral four times a day  sodium chloride 0.9%. 1000 milliLiter(s) (75 mL/Hr) IV Continuous <Continuous>  tamsulosin 0.4 milliGRAM(s) Oral at bedtime  umeclidinium 62.5 MICROgram(s)/vilanterol 25 MICROgram(s) Inhaler 1 Puff(s) Inhalation daily    MEDICATIONS  (PRN):  bisacodyl Suppository 10 milliGRAM(s) Rectal once PRN Constipation  magnesium hydroxide Suspension 30 milliLiter(s) Oral daily PRN Constipation  ondansetron Injectable 4 milliGRAM(s) IV Push every 6 hours PRN Nausea and/or Vomiting  oxyCODONE    IR 5 milliGRAM(s) Oral every 3 hours PRN Severe Pain (7 - 10)  traMADol 25 milliGRAM(s) Oral every 3 hours PRN Mild Pain (1 - 3)  traMADol 50 milliGRAM(s) Oral every 3 hours PRN Moderate Pain (4 - 6)      Physical exam: The right hip dressing is clean, dry and intact. No drainage or discharge. No erythema is noted. No blistering. No ecchymosis. The calf is supple nontender. Passive range of motion is acceptable to due postoperative pain. No calf tenderness. Sensation to light touch is grossly intact distally. Motor function distally is 5/5. No foot drop. 2+ dorsalis pedis pulse. Capillary refill is less than 2 seconds. No cyanosis.    Primary Orthopedic Assessment:  • s/p RIGHT POSTERIOR total hip replacement    Secondary  Orthopedic Assessment(s):   •     Secondary  Medical Assessment(s):   •     Plan:   • DVT prophylaxis as prescribed of eliquis, including use of compression devices and ankle pumps  • Continue physical therapy  • Weightbearing as tolerated of the right lower extremity with assistance of a walker  • Incentive spirometry encouraged  • Pain control as clinically indicated  • Posterior hip precautions reviewed with patient  • Discharge planning – anticipated discharge is subacute rehabilitation   Will need urology follow up 
  MALENA GALINAMICHELELINSEY    [id number]    History: Patient is status post right posterior total hip arthroplasty POD # 0. Patient is doing well and is comfortable. The patient's pain is controlled using the prescribed pain medications. The patient will be participating in physical therapy. Denies numbness or tingling. No new complaints.    Vital Signs Last 24 Hrs  T(C): 36.8 (24 Mar 2023 09:30), Max: 36.9 (24 Mar 2023 06:14)  T(F): 98.3 (24 Mar 2023 09:30), Max: 98.4 (24 Mar 2023 06:14)  HR: 64 (24 Mar 2023 11:30) (62 - 81)  BP: 105/61 (24 Mar 2023 11:30) (91/40 - 133/52)  BP(mean): --  RR: 15 (24 Mar 2023 11:30) (13 - 16)  SpO2: 94% (24 Mar 2023 11:30) (94% - 100%)    Parameters below as of 24 Mar 2023 10:30  Patient On (Oxygen Delivery Method): room air                          11.1   9.14  )-----------( 162      ( 24 Mar 2023 06:00 )             34.0     03-24    x   |  x   |  x   ----------------------------<  x   4.1   |  x   |  x     General: Alert, awake, NAD, abduction pillow in place  Physical exam: The right hip dressing is clean, dry and intact. No drainage, discharge, erythema, blistering or ecchymosis. The calf is supple nontender b/l. SILT. +EHL/FHL/AT/GC. 2+ DP pulse. BCR. No cyanosis.    Plan:   - DVT prophylaxis as prescribed, including use of compression devices and ankle pumps  -  Continue physical therapy  - Weight bearing status of surgical extremity: WBAT  - Incentive spirometry encouraged  - Pain control as clinically indicated  - Posterior hip precautions reviewed with patient  - Discharge planning – anticipated discharge is Home / subacute rehabilitation / acute rehabilitation  - f/u AM labs
MALENA STEIN    [id number]    History: Patient is status post right posterior total hip arthroplasty on 3/24/2023, pod #2. Patient is doing well, stayed for repeat am labs/ RAINER placement. The patient's pain is controlled using the prescribed pain medications. The patient is participating in physical therapy. Denies nausea, vomiting, chest pain, shortness of breath, abdominal pain or fever. No new complaints.                              8.5    8.68  )-----------( 125      ( 25 Mar 2023 05:30 )             26.9     03-25    136  |  106  |  57.4<H>  ----------------------------<  123<H>  4.9   |  18.0<L>  |  7.17<H>    Ca    9.1      25 Mar 2023 05:30          Physical exam: The right hip dressing is clean, dry and intact. No drainage or discharge. No erythema is noted. No blistering. No ecchymosis. The calf is supple nontender.  No calf tenderness. Sensation to light touch is grossly intact distally. Motor function distally is 5/5. No foot drop. 2+ dorsalis pedis pulse. Capillary refill is less than 2 seconds. No cyanosis.    Primary Orthopedic Assessment:  • s/p RIGHT POSTERIOR total hip replacement pod #2    Plan:   • DVT prophylaxis, including use of compression devices and ankle pumps  • Continue physical therapy  • Weightbearing as tolerated of the right lower extremity with assistance of a walker  • Incentive spirometry encouraged  • Pain control as clinically indicated  • Posterior hip precautions reviewed with patient  • Discharge planning – anticipated discharge is [facility] when cleared by med/PT  
Pelvis & hip films reviewed. Implants are in appropriate position. No fracture or dislocation noted. Patient is WBAT of the surgical extremity. 
MALENA STEIN    [id number]    89y      Male    Patient is a 89y old  Male who presents with a chief complaint of right hip surgery (24 Mar 2023 10:21)      INTERVAL HPI/OVERNIGHT EVENTS:    patient's pain is well controlled, denies fever, chills, chest pain, he has no complains     REVIEW OF SYSTEMS:    CONSTITUTIONAL: No fever, fatigue  RESPIRATORY: No cough, No shortness of breath  CARDIOVASCULAR: No chest pain, palpitations  GASTROINTESTINAL: No abdominal, No nausea, vomiting  NEUROLOGICAL: No headaches,  loss of strength.  MISCELLANEOUS: Right hip is controlled        Vital Signs Last 24 Hrs  T(C): 36.7 (26 Mar 2023 09:32), Max: 36.9 (25 Mar 2023 23:14)  T(F): 98.1 (26 Mar 2023 09:32), Max: 98.4 (25 Mar 2023 23:14)  HR: 63 (26 Mar 2023 09:32) (63 - 76)  BP: 109/50 (26 Mar 2023 09:32) (109/50 - 129/62)  RR: 14 (26 Mar 2023 09:32) (14 - 18)  SpO2: 92% (26 Mar 2023 09:32) (92% - 97%)    Parameters below as of 26 Mar 2023 09:32  Patient On (Oxygen Delivery Method): room air        PHYSICAL EXAM:    GENERAL: Elderly male looking comfortable    HEENT: PERRL, +EOMI  NECK: soft, Supple, No JVD,   CHEST/LUNG: Clear to auscultate bilaterally; No wheezing  HEART: S1S2+, Regular rate and rhythm; No murmurs  ABDOMEN: Soft, Nontender, Nondistended; Bowel sounds present, has ileostomy   EXTREMITIES:  1+ Peripheral Pulses, No edema, right hip with clean dressings on, no bleeding or soaking   SKIN: No rashes or lesions  NEURO: AAOX3  PSYCH: normal mood      LABS:                        8.5    8.56  )-----------( 124      ( 26 Mar 2023 08:46 )             26.8     03-26    137  |  104  |  58.4<H>  ----------------------------<  116<H>  4.7   |  18.0<L>  |  7.19<H>    Ca    9.1      26 Mar 2023 08:46              I&O's Summary    25 Mar 2023 07:01  -  26 Mar 2023 07:00  --------------------------------------------------------  IN: 1345 mL / OUT: 600 mL / NET: 745 mL    26 Mar 2023 07:01  -  26 Mar 2023 10:33  --------------------------------------------------------  IN: 0 mL / OUT: 400 mL / NET: -400 mL        MEDICATIONS  (STANDING):  acetaminophen     Tablet .. 975 milliGRAM(s) Oral every 8 hours  acetaminophen   IVPB .. 1000 milliGRAM(s) IV Intermittent once  apixaban 2.5 milliGRAM(s) Oral two times a day  citric acid/sodium citrate Solution 15 milliLiter(s) Oral four times a day  famotidine    Tablet 20 milliGRAM(s) Oral daily  lidocaine 2% Jelly 5 milliLiter(s) IntraUrethral two times a day  nebivolol 2.5 milliGRAM(s) Oral daily  polyethylene glycol 3350 17 Gram(s) Oral at bedtime  senna 2 Tablet(s) Oral at bedtime  sodium bicarbonate 650 milliGRAM(s) Oral four times a day  sodium chloride 0.9%. 1000 milliLiter(s) (75 mL/Hr) IV Continuous <Continuous>  tamsulosin 0.4 milliGRAM(s) Oral at bedtime  umeclidinium 62.5 MICROgram(s)/vilanterol 25 MICROgram(s) Inhaler 1 Puff(s) Inhalation daily    MEDICATIONS  (PRN):  bisacodyl Suppository 10 milliGRAM(s) Rectal once PRN Constipation  magnesium hydroxide Suspension 30 milliLiter(s) Oral daily PRN Constipation  ondansetron Injectable 4 milliGRAM(s) IV Push every 6 hours PRN Nausea and/or Vomiting  oxyCODONE    IR 5 milliGRAM(s) Oral every 3 hours PRN Severe Pain (7 - 10)  traMADol 25 milliGRAM(s) Oral every 3 hours PRN Mild Pain (1 - 3)  traMADol 50 milliGRAM(s) Oral every 3 hours PRN Moderate Pain (4 - 6)

## 2023-03-27 NOTE — PROGRESS NOTE ADULT - ASSESSMENT
89 y/o male with Hx of HTN, COPD, anemia, VLADIMIR with no tx, stage 4 kidney failure, precancerous polyp in colon s/p colectomy and colostomy, Ulcerative colitis, HTN, GERD with  pain to right hip pain progressively worse the past 18 months, completed cortisone injections previously with the last 12/2022 with no relief, came in here for elective right hip total joint replacement 3/24/2023 s/p procedure.     Plan:       Unilateral primary osteoarthritis, right hip S/P right hip surgery  Post op ABX as per SCIP  DVT ppx/Pain control/PT as per Ortho  IS  Wound care / WB status -as per ortho.    Acute on chronic Stage 4 chronic kidney disease: follows with Dr. Mtz  change Protonix to H2 blocker  avoid NSAID's  Monitor BMP  Creatinine went from 6.5 to 7.1, will continue monitoring BMP   creatinine is stable, would recommend BMP to be checked in 1 week and follow up with Nephrology in 1 week as well.      Urine retention: failed voiding trial, Hx of BPH, on tamsulosin might need to follow with Urology as out patient for voiding trial.       H/O ulcerative colitis: Patient has ileostomy- ileostomy care   Monitor output.    HTN (hypertension): Patient on Lasix and Nebivolol ( called pharmacy to add)  Hold Lasix and resume as clinically indicated.    VLADIMIR (obstructive sleep apnea): Not on CPAP  Follows with Dr. Sylvia MCNAIR.      Need for prophylactic measure.    DVT ppx  GI: Pepcid  Bowel rX for opioid induced constipation ppx.    he has clement's cathter in and have him follow with Urology.   CBC and BMP to check in 1 week  follow up with Nephrology   Patient is stable from the medicine point of view for discharge pending PT and Ortho eval.     Medicine team is singing off, please call as needed

## 2023-03-27 NOTE — DISCHARGE NOTE NURSING/CASE MANAGEMENT/SOCIAL WORK - NSDCFUADDAPPT_GEN_ALL_CORE_FT
Patient will be discharged to [facility] [[address] [place] [address]]. Patient will be discharged to [facility] [[address] [place] [address]].    Please follow up with urologist. Patient will be discharged to [facility] [[address] [place] [address]].    Please follow up with Urologist outpatient for void trial.    Recommend BMP to be checked in 1 week and follow up with Nephrology in 1 week as well. Patient will be discharged to [facility] [[address] [place] [address]].    Please follow up with Urologist outpatient for voiding trial.    Recommend BMP to be checked in 1 week and follow up with Nephrology in 1 week as well.

## 2023-03-27 NOTE — DISCHARGE NOTE NURSING/CASE MANAGEMENT/SOCIAL WORK - PATIENT PORTAL LINK FT
You can access the FollowMyHealth Patient Portal offered by [facility] by registering at the following website: [email or web address]. By joining [employer]’s FollowMyHealth portal, you will also be able to view your health information using other applications (apps) compatible with our system.

## 2023-03-27 NOTE — DISCHARGE NOTE NURSING/CASE MANAGEMENT/SOCIAL WORK - NSDCPEFALRISK_GEN_ALL_CORE
For information on Fall & Injury Prevention, visit: [email or web address] OR  [email or web address] OR  https://www.cdc.gov/steadi/patient.html

## 2023-03-29 ENCOUNTER — NON-APPOINTMENT (OUTPATIENT)
Age: 88
End: 2023-03-29

## 2023-03-30 ENCOUNTER — NON-APPOINTMENT (OUTPATIENT)
Age: 88
End: 2023-03-30

## 2023-04-03 ENCOUNTER — TRANSCRIPTION ENCOUNTER (OUTPATIENT)
Age: 88
End: 2023-04-03

## 2023-04-04 ENCOUNTER — APPOINTMENT (OUTPATIENT)
Dept: UROLOGY | Facility: CLINIC | Age: 88
End: 2023-04-04

## 2023-04-11 ENCOUNTER — APPOINTMENT (OUTPATIENT)
Dept: INTERNAL MEDICINE | Facility: CLINIC | Age: 88
End: 2023-04-11
Payer: MEDICARE

## 2023-04-11 VITALS
BODY MASS INDEX: 29.53 KG/M2 | SYSTOLIC BLOOD PRESSURE: 122 MMHG | HEIGHT: 64 IN | DIASTOLIC BLOOD PRESSURE: 60 MMHG | WEIGHT: 173 LBS

## 2023-04-11 DIAGNOSIS — M16.11 UNILATERAL PRIMARY OSTEOARTHRITIS, RIGHT HIP: ICD-10-CM

## 2023-04-11 PROCEDURE — 99214 OFFICE O/P EST MOD 30 MIN: CPT

## 2023-04-11 PROCEDURE — 99495 TRANSJ CARE MGMT MOD F2F 14D: CPT | Mod: 25

## 2023-04-11 RX ORDER — ALBUTEROL SULFATE 90 UG/1
108 (90 BASE) INHALANT RESPIRATORY (INHALATION)
Qty: 1 | Refills: 2 | Status: DISCONTINUED | COMMUNITY
Start: 2022-08-10 | End: 2023-04-11

## 2023-04-11 RX ORDER — LEVALBUTEROL TARTRATE 45 UG/1
45 AEROSOL, METERED ORAL
Qty: 3 | Refills: 3 | Status: DISCONTINUED | COMMUNITY
Start: 2022-04-11 | End: 2023-04-11

## 2023-04-11 NOTE — HISTORY OF PRESENT ILLNESS
[Family Member] : family member [FreeTextEntry1] : [facility] FOLLOWUP HIP SURGERY [de-identified] : 89 PT HERE FOR FOLLOW UP  FEELS OK   S/P HIPS SURGERY DR DUMONT AT [facility]  3/23 THEN WENT TO REHAB BUT DEVELOPED  ISSUE AND WENT HOME AFTER 5 DAYS\par PT HAS BEEN SEEING DR JESSICA AND PLAN IS TRIAL OF VOID\par PT HIP FEELS BETTER AND  WALKING MUCH BETTER \par  CRI SEEING DR SOMMERS MAY NEED DIALYSIS BUT HE HAS LATISHA REFUSING

## 2023-04-11 NOTE — PHYSICAL EXAM
[No Acute Distress] : no acute distress [Well Nourished] : well nourished [Well Developed] : well developed [Well-Appearing] : well-appearing [Normal Sclera/Conjunctiva] : normal sclera/conjunctiva [PERRL] : pupils equal round and reactive to light [EOMI] : extraocular movements intact [Normal Outer Ear/Nose] : the outer ears and nose were normal in appearance [Normal Oropharynx] : the oropharynx was normal [No JVD] : no jugular venous distention [No Lymphadenopathy] : no lymphadenopathy [Supple] : supple [Thyroid Normal, No Nodules] : the thyroid was normal and there were no nodules present [No Respiratory Distress] : no respiratory distress  [No Accessory Muscle Use] : no accessory muscle use [Clear to Auscultation] : lungs were clear to auscultation bilaterally [Normal Rate] : normal rate  [Regular Rhythm] : with a regular rhythm [Normal S1, S2] : normal S1 and S2 [No Murmur] : no murmur heard [No Carotid Bruits] : no carotid bruits [No Abdominal Bruit] : a ~M bruit was not heard ~T in the abdomen [No Varicosities] : no varicosities [Pedal Pulses Present] : the pedal pulses are present [No Edema] : there was no peripheral edema [No Palpable Aorta] : no palpable aorta [No Extremity Clubbing/Cyanosis] : no extremity clubbing/cyanosis [Soft] : abdomen soft [Non Tender] : non-tender [Non-distended] : non-distended [No Masses] : no abdominal mass palpated [No HSM] : no HSM [Normal Bowel Sounds] : normal bowel sounds [Normal Posterior Cervical Nodes] : no posterior cervical lymphadenopathy [Normal Anterior Cervical Nodes] : no anterior cervical lymphadenopathy [No CVA Tenderness] : no CVA  tenderness [No Spinal Tenderness] : no spinal tenderness [No Joint Swelling] : no joint swelling [Grossly Normal Strength/Tone] : grossly normal strength/tone [No Rash] : no rash [Coordination Grossly Intact] : coordination grossly intact [No Focal Deficits] : no focal deficits [Normal Gait] : normal gait [Deep Tendon Reflexes (DTR)] : deep tendon reflexes were 2+ and symmetric [Normal Affect] : the affect was normal [Normal Insight/Judgement] : insight and judgment were intact [de-identified] : OSTOMY [de-identified] : LORENZA IN [place]

## 2023-04-11 NOTE — PLAN
[FreeTextEntry1] : 89 PT HERE FOR FOLLOW UP  FEELS OK   S/P HIPS SURGERY DR DUMONT AT [facility]  3/23 THEN WENT TO REHAB BUT DEVELOPED  ISSUE AND WENT HOME AFTER 5 DAYS\par PT HAS BEEN SEEING DR JESSICA AND PLAN IS TRIAL OF VOID\par PT HIP FEELS BETTER AND  WALKING MUCH BETTER \par  CRI SEEING DR SOMMERS MAY NEED DIALYSIS BUT HE HAS LATISHA REFUSING\par OVERALL NON TOXIC HB DROPPED POSTOP GOT A SHOT OF EPO FROM [facility] AND WILL FOLLOWUP LABS WITH DR SOMMERS\par OVERALL IMPROVING FOLLOWUP WITH UROLOGY

## 2023-04-14 ENCOUNTER — APPOINTMENT (OUTPATIENT)
Dept: ORTHOPEDIC SURGERY | Facility: CLINIC | Age: 88
End: 2023-04-14
Payer: MEDICARE

## 2023-04-14 ENCOUNTER — TRANSCRIPTION ENCOUNTER (OUTPATIENT)
Age: 88
End: 2023-04-14

## 2023-04-14 PROCEDURE — 99024 POSTOP FOLLOW-UP VISIT: CPT

## 2023-04-14 PROCEDURE — 73502 X-RAY EXAM HIP UNI 2-3 VIEWS: CPT

## 2023-04-14 NOTE — END OF VISIT
[FreeTextEntry3] : I, Julian Umanzor, acted solely as a scribe for Dr. Alessandro Sarmiento on 04/14/2023

## 2023-04-14 NOTE — HISTORY OF PRESENT ILLNESS
[Clean/Dry/Intact] : clean, dry and intact [Healed] : healed [Swelling] : swollen [Neuro Intact] : an unremarkable neurological exam [Vascular Intact] : ~T peripheral vascular exam normal [Negative Kay's] : maneuvers demonstrated a negative Kay's sign [Xray (Date:___)] : [unfilled] Xray -  [1] : the patient reports pain that is 1/10 in severity [Doing Well] : is doing well [No Sign of Infection] : is showing no signs of infection [Adequate Pain Control] : has adequate pain control [Chills] : no chills [Constipation] : no constipation [Diarrhea] : no diarrhea [Dysuria] : no dysuria [Fever] : no fever [Nausea] : no nausea [Vomiting] : no vomiting [Erythema] : not erythematous [Discharge] : absent of discharge [Dehiscence] : not dehisced [de-identified] : right hip arthroplasty DOS 3/24/23 [de-identified] : 88 y/o M pt is 3 weeks post op from right ROMINA. Pt is taking Apixaban/Eliquis for DVTP. The pt is doing well. The pt is being treated for kidney issues. The pt has a catheter. He reports no pain for the hip. He is overall doing well with regards to the hip.  [de-identified] : 3V xray of the right hip done in office today and reviewed by Dr. Alessandro Sarmiento demonstrates s/p right hip implants in good positioning with no evidence of wear, loosening, or subsidence.  [de-identified] : Right hip exam shows healing incision with no sign of infection.  [de-identified] : He should continue to do low impact exercises. Pt understands the importance of prophylaxis for invasive dental procedures. Pt is taking Apixaban/Eliquis for DVTP. Patient will continue to adhere to the posterior hip precautions.	 F/u with us in 3 weeks.

## 2023-04-23 RX ORDER — ASPIRIN/CALCIUM CARB/MAGNESIUM 324 MG
1 TABLET ORAL
Qty: 0 | Refills: 0 | DISCHARGE
Start: 2023-04-23 | End: 2023-05-07

## 2023-05-04 ENCOUNTER — INPATIENT (INPATIENT)
Facility: HOSPITAL | Age: 88
LOS: 3 days | Discharge: HOME HEALTH | DRG: 698 | End: 2023-05-08
Attending: INTERNAL MEDICINE | Admitting: HOSPITALIST
Payer: MEDICARE

## 2023-05-04 VITALS
RESPIRATION RATE: 17 BRPM | OXYGEN SATURATION: 97 % | TEMPERATURE: 101 F | DIASTOLIC BLOOD PRESSURE: 66 MMHG | HEIGHT: 64 IN | SYSTOLIC BLOOD PRESSURE: 161 MMHG | HEART RATE: 105 BPM

## 2023-05-04 DIAGNOSIS — N39.0 URINARY TRACT INFECTION, SITE NOT SPECIFIED: ICD-10-CM

## 2023-05-04 DIAGNOSIS — Z98.49 CATARACT EXTRACTION STATUS, UNSPECIFIED EYE: Chronic | ICD-10-CM

## 2023-05-04 DIAGNOSIS — Z90.49 ACQUIRED ABSENCE OF OTHER SPECIFIED PARTS OF DIGESTIVE TRACT: Chronic | ICD-10-CM

## 2023-05-04 DIAGNOSIS — Z98.890 OTHER SPECIFIED POSTPROCEDURAL STATES: Chronic | ICD-10-CM

## 2023-05-04 LAB
ALBUMIN SERPL ELPH-MCNC: 4.1 G/DL — SIGNIFICANT CHANGE UP (ref 3.3–5.2)
ALP SERPL-CCNC: 70 U/L — SIGNIFICANT CHANGE UP (ref 40–120)
ALT FLD-CCNC: 10 U/L — SIGNIFICANT CHANGE UP
ANION GAP SERPL CALC-SCNC: 18 MMOL/L — HIGH (ref 5–17)
APPEARANCE UR: CLEAR — SIGNIFICANT CHANGE UP
APTT BLD: 30 SEC — SIGNIFICANT CHANGE UP (ref 27.5–35.5)
AST SERPL-CCNC: 13 U/L — SIGNIFICANT CHANGE UP
BACTERIA # UR AUTO: ABNORMAL
BASE EXCESS BLDV CALC-SCNC: -6.3 MMOL/L — LOW (ref -2–3)
BASOPHILS # BLD AUTO: 0 K/UL — SIGNIFICANT CHANGE UP (ref 0–0.2)
BASOPHILS NFR BLD AUTO: 0 % — SIGNIFICANT CHANGE UP (ref 0–2)
BILIRUB SERPL-MCNC: 0.8 MG/DL — SIGNIFICANT CHANGE UP (ref 0.4–2)
BILIRUB UR-MCNC: NEGATIVE — SIGNIFICANT CHANGE UP
BUN SERPL-MCNC: 48.8 MG/DL — HIGH (ref 8–20)
CA-I SERPL-SCNC: 1.34 MMOL/L — HIGH (ref 1.15–1.33)
CALCIUM SERPL-MCNC: 10.5 MG/DL — SIGNIFICANT CHANGE UP (ref 8.4–10.5)
CHLORIDE BLDV-SCNC: 110 MMOL/L — HIGH (ref 96–108)
CHLORIDE SERPL-SCNC: 102 MMOL/L — SIGNIFICANT CHANGE UP (ref 96–108)
CO2 SERPL-SCNC: 18 MMOL/L — LOW (ref 22–29)
COLOR SPEC: YELLOW — SIGNIFICANT CHANGE UP
CREAT SERPL-MCNC: 6.02 MG/DL — HIGH (ref 0.5–1.3)
DIFF PNL FLD: ABNORMAL
EGFR: 8 ML/MIN/1.73M2 — LOW
EOSINOPHIL # BLD AUTO: 0.44 K/UL — SIGNIFICANT CHANGE UP (ref 0–0.5)
EOSINOPHIL NFR BLD AUTO: 3.5 % — SIGNIFICANT CHANGE UP (ref 0–6)
EPI CELLS # UR: SIGNIFICANT CHANGE UP
GAS PNL BLDV: 136 MMOL/L — SIGNIFICANT CHANGE UP (ref 136–145)
GAS PNL BLDV: SIGNIFICANT CHANGE UP
GAS PNL BLDV: SIGNIFICANT CHANGE UP
GIANT PLATELETS BLD QL SMEAR: PRESENT — SIGNIFICANT CHANGE UP
GLUCOSE BLDV-MCNC: 101 MG/DL — HIGH (ref 70–99)
GLUCOSE SERPL-MCNC: 96 MG/DL — SIGNIFICANT CHANGE UP (ref 70–99)
GLUCOSE UR QL: NEGATIVE MG/DL — SIGNIFICANT CHANGE UP
HCO3 BLDV-SCNC: 18 MMOL/L — LOW (ref 22–29)
HCT VFR BLD CALC: 32.7 % — LOW (ref 39–50)
HCT VFR BLDA CALC: 35 % — SIGNIFICANT CHANGE UP
HGB BLD CALC-MCNC: 11.5 G/DL — LOW (ref 12.6–17.4)
HGB BLD-MCNC: 10.9 G/DL — LOW (ref 13–17)
INR BLD: 1.09 RATIO — SIGNIFICANT CHANGE UP (ref 0.88–1.16)
KETONES UR-MCNC: NEGATIVE — SIGNIFICANT CHANGE UP
LACTATE BLDV-MCNC: 2.6 MMOL/L — HIGH (ref 0.5–2)
LEUKOCYTE ESTERASE UR-ACNC: ABNORMAL
LYMPHOCYTES # BLD AUTO: 0.77 K/UL — LOW (ref 1–3.3)
LYMPHOCYTES # BLD AUTO: 6.1 % — LOW (ref 13–44)
MANUAL SMEAR VERIFICATION: SIGNIFICANT CHANGE UP
MCHC RBC-ENTMCNC: 31.4 PG — SIGNIFICANT CHANGE UP (ref 27–34)
MCHC RBC-ENTMCNC: 33.3 GM/DL — SIGNIFICANT CHANGE UP (ref 32–36)
MCV RBC AUTO: 94.2 FL — SIGNIFICANT CHANGE UP (ref 80–100)
MONOCYTES # BLD AUTO: 0.44 K/UL — SIGNIFICANT CHANGE UP (ref 0–0.9)
MONOCYTES NFR BLD AUTO: 3.5 % — SIGNIFICANT CHANGE UP (ref 2–14)
NEUTROPHILS # BLD AUTO: 11.02 K/UL — HIGH (ref 1.8–7.4)
NEUTROPHILS NFR BLD AUTO: 86.9 % — HIGH (ref 43–77)
NITRITE UR-MCNC: NEGATIVE — SIGNIFICANT CHANGE UP
PCO2 BLDV: 28 MMHG — LOW (ref 42–55)
PH BLDV: 7.42 — SIGNIFICANT CHANGE UP (ref 7.32–7.43)
PH UR: 6.5 — SIGNIFICANT CHANGE UP (ref 5–8)
PLAT MORPH BLD: NORMAL — SIGNIFICANT CHANGE UP
PLATELET # BLD AUTO: 191 K/UL — SIGNIFICANT CHANGE UP (ref 150–400)
PO2 BLDV: 79 MMHG — HIGH (ref 25–45)
POTASSIUM BLDV-SCNC: 4.6 MMOL/L — SIGNIFICANT CHANGE UP (ref 3.5–5.1)
POTASSIUM SERPL-MCNC: 4.3 MMOL/L — SIGNIFICANT CHANGE UP (ref 3.5–5.3)
POTASSIUM SERPL-SCNC: 4.3 MMOL/L — SIGNIFICANT CHANGE UP (ref 3.5–5.3)
PROT SERPL-MCNC: 7.1 G/DL — SIGNIFICANT CHANGE UP (ref 6.6–8.7)
PROT UR-MCNC: 100
PROTHROM AB SERPL-ACNC: 12.6 SEC — SIGNIFICANT CHANGE UP (ref 10.5–13.4)
RBC # BLD: 3.47 M/UL — LOW (ref 4.2–5.8)
RBC # FLD: 13.6 % — SIGNIFICANT CHANGE UP (ref 10.3–14.5)
RBC BLD AUTO: NORMAL — SIGNIFICANT CHANGE UP
RBC CASTS # UR COMP ASSIST: ABNORMAL /HPF (ref 0–4)
SAO2 % BLDV: 97.5 % — SIGNIFICANT CHANGE UP
SODIUM SERPL-SCNC: 138 MMOL/L — SIGNIFICANT CHANGE UP (ref 135–145)
SP GR SPEC: 1.01 — SIGNIFICANT CHANGE UP (ref 1.01–1.02)
UROBILINOGEN FLD QL: NEGATIVE MG/DL — SIGNIFICANT CHANGE UP
WBC # BLD: 12.68 K/UL — HIGH (ref 3.8–10.5)
WBC # FLD AUTO: 12.68 K/UL — HIGH (ref 3.8–10.5)
WBC UR QL: ABNORMAL /HPF (ref 0–5)

## 2023-05-04 PROCEDURE — 99285 EMERGENCY DEPT VISIT HI MDM: CPT

## 2023-05-04 PROCEDURE — 99223 1ST HOSP IP/OBS HIGH 75: CPT

## 2023-05-04 PROCEDURE — 71045 X-RAY EXAM CHEST 1 VIEW: CPT | Mod: 26

## 2023-05-04 PROCEDURE — G1004: CPT

## 2023-05-04 PROCEDURE — 99497 ADVNCD CARE PLAN 30 MIN: CPT | Mod: 25

## 2023-05-04 PROCEDURE — 74176 CT ABD & PELVIS W/O CONTRAST: CPT | Mod: 26,MG

## 2023-05-04 RX ORDER — CEFTRIAXONE 500 MG/1
1000 INJECTION, POWDER, FOR SOLUTION INTRAMUSCULAR; INTRAVENOUS ONCE
Refills: 0 | Status: COMPLETED | OUTPATIENT
Start: 2023-05-04 | End: 2023-05-04

## 2023-05-04 RX ORDER — SODIUM CHLORIDE 9 MG/ML
1000 INJECTION INTRAMUSCULAR; INTRAVENOUS; SUBCUTANEOUS ONCE
Refills: 0 | Status: COMPLETED | OUTPATIENT
Start: 2023-05-04 | End: 2023-05-04

## 2023-05-04 RX ORDER — ACETAMINOPHEN 500 MG
650 TABLET ORAL ONCE
Refills: 0 | Status: COMPLETED | OUTPATIENT
Start: 2023-05-04 | End: 2023-05-04

## 2023-05-04 RX ORDER — AZITHROMYCIN 500 MG/1
500 TABLET, FILM COATED ORAL ONCE
Refills: 0 | Status: COMPLETED | OUTPATIENT
Start: 2023-05-04 | End: 2023-05-04

## 2023-05-04 RX ORDER — ONDANSETRON 8 MG/1
4 TABLET, FILM COATED ORAL ONCE
Refills: 0 | Status: COMPLETED | OUTPATIENT
Start: 2023-05-04 | End: 2023-05-04

## 2023-05-04 RX ORDER — CEFTRIAXONE 500 MG/1
1000 INJECTION, POWDER, FOR SOLUTION INTRAMUSCULAR; INTRAVENOUS ONCE
Refills: 0 | Status: DISCONTINUED | OUTPATIENT
Start: 2023-05-04 | End: 2023-05-04

## 2023-05-04 RX ADMIN — SODIUM CHLORIDE 1000 MILLILITER(S): 9 INJECTION INTRAMUSCULAR; INTRAVENOUS; SUBCUTANEOUS at 19:33

## 2023-05-04 RX ADMIN — Medication 650 MILLIGRAM(S): at 20:29

## 2023-05-04 RX ADMIN — CEFTRIAXONE 1000 MILLIGRAM(S): 500 INJECTION, POWDER, FOR SOLUTION INTRAMUSCULAR; INTRAVENOUS at 19:28

## 2023-05-04 RX ADMIN — Medication 650 MILLIGRAM(S): at 19:29

## 2023-05-04 RX ADMIN — AZITHROMYCIN 255 MILLIGRAM(S): 500 TABLET, FILM COATED ORAL at 22:24

## 2023-05-04 RX ADMIN — ONDANSETRON 4 MILLIGRAM(S): 8 TABLET, FILM COATED ORAL at 19:29

## 2023-05-04 NOTE — ED ADULT NURSE NOTE - ED CARDIAC CAPILLARY REFILL
2 seconds or less Partial Purse String (Simple) Text: Given the location of the defect and the characteristics of the surrounding skin a simple purse string closure was deemed most appropriate.  Undermining was performed circumfirentially around the surgical defect.  A purse string suture was then placed and tightened. Wound tension only allowed a partial closure of the circular defect.

## 2023-05-04 NOTE — ED PROVIDER NOTE - CLINICAL SUMMARY MEDICAL DECISION MAKING FREE TEXT BOX
Patient received in signout from Dr Mcknight pending CTr and admission for UTI/PNA. Patient received in signout from Dr Mcknight pending CTr and admission for UTI/PNA.    Carina Juan Luis DO: 89yo male with PMH chronic kidney disease stage 4 presenting with rigors and chills. Found to meet sepsis criteria upon initial presentation. Given IV fluids, antipyretic, and empiric antibiotics. CXR notable for retrocardiac pneumonia, leukocytosis, elevated lactate and positive UA. CT a/p ordered to evaluate for obstructive pathology and pending results upon signout to Dr. Moran.

## 2023-05-04 NOTE — H&P ADULT - NSICDXPASTSURGICALHX_GEN_ALL_CORE_FT
PAST SURGICAL HISTORY:  H/O colectomy     History of cholecystectomy     Previous back surgery     S/P cataract surgery     S/P total right hip arthroplasty

## 2023-05-04 NOTE — H&P ADULT - HISTORY OF PRESENT ILLNESS
no 89 yo male with pmhx CKD4, Urinary retention with chronic De La Rosa (since 3/2023 after hip sx), COPD, GERD, UC s/p total colectomy 20 years ago, HTN presenting to the ED with Fever of 102 at home associated with weakness and chills/rigors. Patient states his De La Rosa was changed in the urologists office yesterday and is declining to have it exchanged here in the ED today.  88 yo male with pmhx CKD4, Urinary retention with chronic De La Rosa (since 3/2023 after hip sx), COPD, GERD, UC s/p total colectomy 20 years ago, HTN presenting to the ED with Fever of 102 at home associated with weakness and chills/rigors. Patient states his De La Rosa was changed in the urologists office yesterday and is declining to have it exchanged here in the ED today. Son is bedside to assist with history as patient is very [place] without hearing aids in. States patient had similar symptoms of chills and feeling unwell about 1 week ago, went to [facility] via EMS, but was feeling better upon arrival and decided to leave without treatment. For the past 1.5 weeks he has been complaining of some burning at the tip of his penis as well. He denies SOB, cough, chest pain, N/V/D. Endorses feeling much better since arriving to the hospital after receiving antibiotics.

## 2023-05-04 NOTE — ED ADULT NURSE NOTE - ED STAT RN HANDOFF DETAILS
handoff care to MONTEZ Wood. handoff care at this time. pt in NAD. RR even and unlabored. family updated on plan.

## 2023-05-04 NOTE — ED PROVIDER NOTE - MDM ORDERS SUBMITTED SELECTION
Pt unable to give urine specimen for POCT urine dip and urine culture.   Labs/EKG/Imaging Studies/Medications

## 2023-05-04 NOTE — ED ADULT NURSE REASSESSMENT NOTE - NS ED NURSE REASSESS COMMENT FT1
Assumed care of pt at 19:15 as stated in report from MONTEZ Fernandez. Charting as noted. Patient A&O x4, denies pain/discomfort, denies CP/SOB. pt states "I feel much better after the medicine". clement in place draining yellow urine, placed yesterday. colostomy in place. pt and family Updated on the plan of care. Call bell within reach, bed locked in lowest position. IV site flushed w/ NS. No redness, swelling or pain noted to site. No signs of acute distress noted, safety maintained. Pt remains on CM in NSR. pt awaiting CT.

## 2023-05-04 NOTE — ED ADULT TRIAGE NOTE - CHIEF COMPLAINT QUOTE
c/o fever 102.9 @ home , + chills / shakes,  ; hx chronic clement w/ leg bag & last had clement replaced yesterday. ileostomy in place.

## 2023-05-04 NOTE — H&P ADULT - NSHPPHYSICALEXAM_GEN_ALL_CORE
Vital Signs Last 24 Hrs  T(C): 36.6 (04 May 2023 23:53), Max: 38.3 (04 May 2023 16:41)  T(F): 97.9 (04 May 2023 23:53), Max: 100.9 (04 May 2023 16:41)  HR: 70 (04 May 2023 23:53) (70 - 105)  BP: 114/56 (04 May 2023 23:53) (114/56 - 161/66)  BP(mean): --  RR: 19 (04 May 2023 23:53) (17 - 19)  SpO2: 97% (04 May 2023 23:53) (97% - 98%)    Parameters below as of 04 May 2023 23:53  Patient On (Oxygen Delivery Method): room air    General: Age-appearing, in no acute distress  Head: Normocephalic, atraumatic  ENMT: EOMI, neck supple  Cardiovascular: +S1, S2; Regular rate and rhythm, no murmurs, rubs, gallops  Respiratory: CTA BL, no wheezes, rales, rhonchi  Gastrointestinal: Abdomen soft, non-tender, +BS in all 4 quadrants  Extremities: No clubbing, cyanosis, or edema  Vascular: 2+ pulses, cap refill < 2 seconds  Neuro: Non-focal, AAOx4, sensation intact BL  Musculoskeletal: Normal tone, no deformities  Skin: Warm, dry; no acute rash seen  Psych: Appropriate, cooperative Vital Signs Last 24 Hrs  T(C): 36.6 (04 May 2023 23:53), Max: 38.3 (04 May 2023 16:41)  T(F): 97.9 (04 May 2023 23:53), Max: 100.9 (04 May 2023 16:41)  HR: 70 (04 May 2023 23:53) (70 - 105)  BP: 114/56 (04 May 2023 23:53) (114/56 - 161/66)  BP(mean): --  RR: 19 (04 May 2023 23:53) (17 - 19)  SpO2: 97% (04 May 2023 23:53) (97% - 98%)    Parameters below as of 04 May 2023 23:53  Patient On (Oxygen Delivery Method): room air    General: Age-appearing, in no acute distress, hard of hearing  Head: Normocephalic, atraumatic  ENMT: EOMI, neck supple  Cardiovascular: +S1, S2; Regular rate and rhythm, no murmurs, rubs, gallops; 1-2+ pitting edema BL LE (patient/family state this is baseline)  Respiratory: CTA BL, no wheezes, rales, rhonchi  Gastrointestinal: Abdomen soft, non-tender, +BS in all 4 quadrants; ileostomy present, no surrounding erythema or sign of infection  : De La Rosa in place, no suprapubic tenderness  Extremities: No clubbing, cyanosis  Vascular: 2+ pulses, cap refill < 2 seconds  Neuro: Non-focal, AAOx4, sensation intact BL  Musculoskeletal: Normal tone, no deformities  Skin: Warm, dry; no acute rash seen  Psych: Appropriate, cooperative

## 2023-05-04 NOTE — H&P ADULT - ASSESSMENT
89 yo male with pmhx CKD4, Urinary retention with chronic De La Rosa (since 3/2023 after hip sx), COPD, GERD, UC s/p total colectomy 20 years ago, HTN presenting to the ED with Fever of 102 at home associated with weakness and chills/rigors.    Sepsis 2/2 CAP vs UTI  -Admit to medicine  -SIRS criteria met WBC 12K, T 100.9   -CXR with LLL possible infiltrate  -UA with +LE, pyuria, bateruria - off De La Rosa catheter that was changed yesterday as patient declined catheter exchange  -CTa/p consistent with UTI showing bladder wall thickening  -Cover for both UTI and CAP with Ceftriaxone 1 gm daily for gram neg organisms; add Azithro 500 mg daily for atypical PNAs  -Follow up blood, urine, and sputum cultures, Urine legionella and strep   -Monitor CBC    Urinary Retention with Chronic De La Rosa  -Maintain De La Rosa, changed outpatient 5/3/23  -Continue Tamsulosin 0.4 mg qhs    COPD      HTN  -Continue Nebivolol 2.5 mg daily with hold parameters    DVTppx:   GOC: 89 yo male with pmhx CKD4, Urinary retention with chronic De La Rosa (since 3/2023 after hip sx), COPD, GERD, UC s/p total colectomy 20 years ago, HTN presenting to the ED with Fever of 102 at home associated with weakness and chills/rigors.    Sepsis 2/2 CAUTI  -Admit to medicine  -SIRS criteria met WBC 12K, T 100.9   -Lactate 2.6 -> 1.5 after IVF  -CXR with LLL possible infiltrate, however lower lungs on CT showing just atelectasis, which patient states he chronically has from PNA in the past; he has not had any respiratory sxs including SOB, cough  -UA with +LE, pyuria, bacteruria - off De La Rosa catheter that was changed yesterday as patient declined catheter exchange  -CTa/p consistent with UTI showing bladder wall thickening  -Ceftriaxone 1 gm daily for gram neg organisms  -Follow up blood and urine cultures  -Monitor CBC    Urinary Retention with Chronic De La Rosa  -Maintain De La Rosa, changed outpatient 5/3/23  -Continue Tamsulosin 0.4 mg qhs    CKD4  -Follows with Dr. Mtz, has been suggested to start HD in the past but patient has declined  -On Lasix for LE Edema/hx of pulmonary edema 2/2 renal disease  -Cr is at baseline, hold Lasix for now while treating sepsis; monitor volume status  -Continue Sodium bicarb daily  -Avoid nephrotoxic agents and renally dose medications    COPD  -Symbicort and Spiriva in place of non-formulary Anroro Ellipta      HTN  -Continue Nebivolol 2.5 mg daily with hold parameters    DVTppx: Heparin  GOC: Full Code

## 2023-05-04 NOTE — ED PROVIDER NOTE - CARE PLAN
Principal Discharge DX:	UTI (urinary tract infection)  Secondary Diagnosis:	CAP (community acquired pneumonia)   1

## 2023-05-04 NOTE — H&P ADULT - NSHPLABSRESULTS_GEN_ALL_CORE
10.9   12.68 )-----------( 191      ( 04 May 2023 17:12 )             32.7     04 May 2023 17:12    138    |  102    |  48.8   ----------------------------<  96     4.3     |  18.0   |  6.02     Ca    10.5       04 May 2023 17:12    TPro  7.1    /  Alb  4.1    /  TBili  0.8    /  DBili  x      /  AST  13     /  ALT  10     /  AlkPhos  70     04 May 2023 17:12    PT/INR - ( 04 May 2023 17:12 )   PT: 12.6 sec;   INR: 1.09 ratio         PTT - ( 04 May 2023 17:12 )  PTT:30.0 sec  CAPILLARY BLOOD GLUCOSE        LIVER FUNCTIONS - ( 04 May 2023 17:12 )  Alb: 4.1 g/dL / Pro: 7.1 g/dL / ALK PHOS: 70 U/L / ALT: 10 U/L / AST: 13 U/L / GGT: x           Urinalysis Basic - ( 04 May 2023 21:50 )    Color: Yellow / Appearance: Clear / S.010 / pH: x  Gluc: x / Ketone: Negative  / Bili: Negative / Urobili: Negative mg/dL   Blood: x / Protein: 100 / Nitrite: Negative   Leuk Esterase: Moderate / RBC: 11-25 /HPF / WBC 26-50 /HPF   Sq Epi: x / Non Sq Epi: x / Bacteria: Moderate        < from: CT Abdomen and Pelvis No Cont (23 @ 21:48) >    IMPRESSION:  No right or left hydroureteronephrosis or obstructing urinary tract   calculus. Circumferential thickening of the urinary bladder walls with   mild perivesicular fatty infiltration which raises question of cystitis,   though the findings may be secondary to chronic outlet obstruction and   large prostate gland. Layering bladder calculi.    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    < end of copied text > 10.9   12.68 )-----------( 191      ( 04 May 2023 17:12 )                32.7     04 May 2023 17:12    138    |  102    |  48.8   ----------------------------<  96     4.3     |  18.0   |  6.02     Ca    10.5       04 May 2023 17:12    TPro  7.1    /  Alb  4.1    /  TBili  0.8    /  DBili  x      /  AST  13     /  ALT  10     /  AlkPhos  70     04 May 2023 17:12    PT/INR - ( 04 May 2023 17:12 )   PT: 12.6 sec;   INR: 1.09 ratio         PTT - ( 04 May 2023 17:12 )  PTT:30.0 sec  CAPILLARY BLOOD GLUCOSE        LIVER FUNCTIONS - ( 04 May 2023 17:12 )  Alb: 4.1 g/dL / Pro: 7.1 g/dL / ALK PHOS: 70 U/L / ALT: 10 U/L / AST: 13 U/L / GGT: x           Urinalysis Basic - ( 04 May 2023 21:50 )    Color: Yellow / Appearance: Clear / S.010 / pH: x  Gluc: x / Ketone: Negative  / Bili: Negative / Urobili: Negative mg/dL   Blood: x / Protein: 100 / Nitrite: Negative   Leuk Esterase: Moderate / RBC: 11-25 /HPF / WBC 26-50 /HPF   Sq Epi: x / Non Sq Epi: x / Bacteria: Moderate    < from: CT Abdomen and Pelvis No Cont (23 @ 21:48) >    IMPRESSION:  No right or left hydroureteronephrosis or obstructing urinary tract   calculus. Circumferential thickening of the urinary bladder walls with   mild perivesicular fatty infiltration which raises question of cystitis,   though the findings may be secondary to chronic outlet obstruction and   large prostate gland. Layering bladder calculi.    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    < end of copied text >

## 2023-05-04 NOTE — ED PROVIDER NOTE - OBJECTIVE STATEMENT
88yo male with PMH chronic kidney disease stage 4, COPD, gastroesophageal reflux disease, ulcerative colitis s/p total colectomy with ileostomy over 20 years prior, HTN presenting with weakness and chills. Patient recently had R hip replacement with Torsten Noyola 2 months prior. Since then he has required an indwelling clement catheter due to urinary retention. THe clement was replaced yesterday. Today, patient started having chills and rigors. No chest pain, palpitations, shortness of breath. +weakness. Denies hip pain, rash.

## 2023-05-04 NOTE — ED ADULT NURSE NOTE - NSIMPLEMENTINTERV_GEN_ALL_ED
Implemented All Fall Risk Interventions:  [place] to call system. Call bell, personal items and telephone within reach. Instruct patient to call for assistance. Room bathroom lighting operational. Non-slip footwear when patient is off stretcher. Physically safe environment: no spills, clutter or unnecessary equipment. Stretcher in lowest position, wheels locked, appropriate side rails in place. Provide visual cue, wrist band, yellow gown, etc. Monitor gait and stability. Monitor for mental status changes and reorient to person, place, and time. Review medications for side effects contributing to fall risk. Reinforce activity limits and safety measures with patient and family.

## 2023-05-05 DIAGNOSIS — Z96.641 PRESENCE OF RIGHT ARTIFICIAL HIP JOINT: Chronic | ICD-10-CM

## 2023-05-05 LAB
ANION GAP SERPL CALC-SCNC: 15 MMOL/L — SIGNIFICANT CHANGE UP (ref 5–17)
BASE EXCESS BLDV CALC-SCNC: -8.5 MMOL/L — LOW (ref -2–3)
BASOPHILS # BLD AUTO: 0.04 K/UL — SIGNIFICANT CHANGE UP (ref 0–0.2)
BASOPHILS NFR BLD AUTO: 0.4 % — SIGNIFICANT CHANGE UP (ref 0–2)
BUN SERPL-MCNC: 46.5 MG/DL — HIGH (ref 8–20)
CA-I SERPL-SCNC: 1.28 MMOL/L — SIGNIFICANT CHANGE UP (ref 1.15–1.33)
CALCIUM SERPL-MCNC: 9.4 MG/DL — SIGNIFICANT CHANGE UP (ref 8.4–10.5)
CHLORIDE BLDV-SCNC: 110 MMOL/L — HIGH (ref 96–108)
CHLORIDE SERPL-SCNC: 107 MMOL/L — SIGNIFICANT CHANGE UP (ref 96–108)
CO2 SERPL-SCNC: 19 MMOL/L — LOW (ref 22–29)
CREAT SERPL-MCNC: 6.79 MG/DL — HIGH (ref 0.5–1.3)
EGFR: 7 ML/MIN/1.73M2 — LOW
EOSINOPHIL # BLD AUTO: 0.11 K/UL — SIGNIFICANT CHANGE UP (ref 0–0.5)
EOSINOPHIL NFR BLD AUTO: 1 % — SIGNIFICANT CHANGE UP (ref 0–6)
GAS PNL BLDV: 135 MMOL/L — LOW (ref 136–145)
GLUCOSE BLDV-MCNC: 148 MG/DL — HIGH (ref 70–99)
GLUCOSE SERPL-MCNC: 88 MG/DL — SIGNIFICANT CHANGE UP (ref 70–99)
GRAM STN FLD: SIGNIFICANT CHANGE UP
HCO3 BLDV-SCNC: 17 MMOL/L — LOW (ref 22–29)
HCT VFR BLD CALC: 30.7 % — LOW (ref 39–50)
HCT VFR BLDA CALC: 28 % — SIGNIFICANT CHANGE UP
HGB BLD CALC-MCNC: 9.4 G/DL — LOW (ref 12.6–17.4)
HGB BLD-MCNC: 9.6 G/DL — LOW (ref 13–17)
IMM GRANULOCYTES NFR BLD AUTO: 2.6 % — HIGH (ref 0–0.9)
K OXYTOCA DNA BLD POS QL NAA+NON-PROBE: SIGNIFICANT CHANGE UP
LACTATE BLDV-MCNC: 1.5 MMOL/L — SIGNIFICANT CHANGE UP (ref 0.5–2)
LYMPHOCYTES # BLD AUTO: 1.09 K/UL — SIGNIFICANT CHANGE UP (ref 1–3.3)
LYMPHOCYTES # BLD AUTO: 9.6 % — LOW (ref 13–44)
MCHC RBC-ENTMCNC: 29.7 PG — SIGNIFICANT CHANGE UP (ref 27–34)
MCHC RBC-ENTMCNC: 31.3 GM/DL — LOW (ref 32–36)
MCV RBC AUTO: 95 FL — SIGNIFICANT CHANGE UP (ref 80–100)
METHOD TYPE: SIGNIFICANT CHANGE UP
MONOCYTES # BLD AUTO: 1.38 K/UL — HIGH (ref 0–0.9)
MONOCYTES NFR BLD AUTO: 12.2 % — SIGNIFICANT CHANGE UP (ref 2–14)
NEUTROPHILS # BLD AUTO: 8.43 K/UL — HIGH (ref 1.8–7.4)
NEUTROPHILS NFR BLD AUTO: 74.2 % — SIGNIFICANT CHANGE UP (ref 43–77)
PCO2 BLDV: 31 MMHG — LOW (ref 42–55)
PH BLDV: 7.35 — SIGNIFICANT CHANGE UP (ref 7.32–7.43)
PLATELET # BLD AUTO: 172 K/UL — SIGNIFICANT CHANGE UP (ref 150–400)
PO2 BLDV: 102 MMHG — HIGH (ref 25–45)
POTASSIUM BLDV-SCNC: 4.2 MMOL/L — SIGNIFICANT CHANGE UP (ref 3.5–5.1)
POTASSIUM SERPL-MCNC: 4.4 MMOL/L — SIGNIFICANT CHANGE UP (ref 3.5–5.3)
POTASSIUM SERPL-SCNC: 4.4 MMOL/L — SIGNIFICANT CHANGE UP (ref 3.5–5.3)
RBC # BLD: 3.23 M/UL — LOW (ref 4.2–5.8)
RBC # FLD: 13.4 % — SIGNIFICANT CHANGE UP (ref 10.3–14.5)
SAO2 % BLDV: 99.4 % — SIGNIFICANT CHANGE UP
SODIUM SERPL-SCNC: 141 MMOL/L — SIGNIFICANT CHANGE UP (ref 135–145)
SPECIMEN SOURCE: SIGNIFICANT CHANGE UP
SPECIMEN SOURCE: SIGNIFICANT CHANGE UP
WBC # BLD: 11.34 K/UL — HIGH (ref 3.8–10.5)
WBC # FLD AUTO: 11.34 K/UL — HIGH (ref 3.8–10.5)

## 2023-05-05 PROCEDURE — 93010 ELECTROCARDIOGRAM REPORT: CPT

## 2023-05-05 PROCEDURE — 99223 1ST HOSP IP/OBS HIGH 75: CPT

## 2023-05-05 RX ORDER — SODIUM BICARBONATE 1 MEQ/ML
1 SYRINGE (ML) INTRAVENOUS
Refills: 0 | DISCHARGE

## 2023-05-05 RX ORDER — MIDODRINE HYDROCHLORIDE 2.5 MG/1
2.5 TABLET ORAL THREE TIMES A DAY
Refills: 0 | Status: DISCONTINUED | OUTPATIENT
Start: 2023-05-05 | End: 2023-05-06

## 2023-05-05 RX ORDER — NEBIVOLOL HYDROCHLORIDE 5 MG/1
2.5 TABLET ORAL DAILY
Refills: 0 | Status: DISCONTINUED | OUTPATIENT
Start: 2023-05-05 | End: 2023-05-08

## 2023-05-05 RX ORDER — ONDANSETRON 8 MG/1
4 TABLET, FILM COATED ORAL EVERY 8 HOURS
Refills: 0 | Status: DISCONTINUED | OUTPATIENT
Start: 2023-05-04 | End: 2023-05-08

## 2023-05-05 RX ORDER — BUDESONIDE AND FORMOTEROL FUMARATE DIHYDRATE 160; 4.5 UG/1; UG/1
2 AEROSOL RESPIRATORY (INHALATION)
Refills: 0 | Status: DISCONTINUED | OUTPATIENT
Start: 2023-05-05 | End: 2023-05-08

## 2023-05-05 RX ORDER — CITRIC ACID/SODIUM CITRATE 300-500 MG
15 SOLUTION, ORAL ORAL
Qty: 0 | Refills: 0 | DISCHARGE

## 2023-05-05 RX ORDER — MEROPENEM 1 G/30ML
500 INJECTION INTRAVENOUS EVERY 24 HOURS
Refills: 0 | Status: DISCONTINUED | OUTPATIENT
Start: 2023-05-05 | End: 2023-05-05

## 2023-05-05 RX ORDER — FUROSEMIDE 40 MG
40 TABLET ORAL DAILY
Refills: 0 | Status: DISCONTINUED | OUTPATIENT
Start: 2023-05-05 | End: 2023-05-05

## 2023-05-05 RX ORDER — SODIUM BICARBONATE 1 MEQ/ML
650 SYRINGE (ML) INTRAVENOUS
Refills: 0 | Status: DISCONTINUED | OUTPATIENT
Start: 2023-05-05 | End: 2023-05-05

## 2023-05-05 RX ORDER — SODIUM BICARBONATE 1 MEQ/ML
650 SYRINGE (ML) INTRAVENOUS DAILY
Refills: 0 | Status: DISCONTINUED | OUTPATIENT
Start: 2023-05-05 | End: 2023-05-08

## 2023-05-05 RX ORDER — AZITHROMYCIN 500 MG/1
500 TABLET, FILM COATED ORAL EVERY 24 HOURS
Refills: 0 | Status: DISCONTINUED | OUTPATIENT
Start: 2023-05-05 | End: 2023-05-05

## 2023-05-05 RX ORDER — TAMSULOSIN HYDROCHLORIDE 0.4 MG/1
0.4 CAPSULE ORAL AT BEDTIME
Refills: 0 | Status: DISCONTINUED | OUTPATIENT
Start: 2023-05-05 | End: 2023-05-08

## 2023-05-05 RX ORDER — MEROPENEM 1 G/30ML
500 INJECTION INTRAVENOUS EVERY 24 HOURS
Refills: 0 | Status: DISCONTINUED | OUTPATIENT
Start: 2023-05-05 | End: 2023-05-08

## 2023-05-05 RX ORDER — CEFTRIAXONE 500 MG/1
1000 INJECTION, POWDER, FOR SOLUTION INTRAMUSCULAR; INTRAVENOUS EVERY 24 HOURS
Refills: 0 | Status: DISCONTINUED | OUTPATIENT
Start: 2023-05-05 | End: 2023-05-05

## 2023-05-05 RX ORDER — TIOTROPIUM BROMIDE 18 UG/1
2 CAPSULE ORAL; RESPIRATORY (INHALATION) DAILY
Refills: 0 | Status: DISCONTINUED | OUTPATIENT
Start: 2023-05-05 | End: 2023-05-08

## 2023-05-05 RX ORDER — ACETAMINOPHEN 500 MG
650 TABLET ORAL EVERY 6 HOURS
Refills: 0 | Status: DISCONTINUED | OUTPATIENT
Start: 2023-05-04 | End: 2023-05-08

## 2023-05-05 RX ORDER — SODIUM CHLORIDE 9 MG/ML
1000 INJECTION, SOLUTION INTRAVENOUS
Refills: 0 | Status: DISCONTINUED | OUTPATIENT
Start: 2023-05-05 | End: 2023-05-06

## 2023-05-05 RX ORDER — CHOLECALCIFEROL (VITAMIN D3) 125 MCG
1000 CAPSULE ORAL DAILY
Refills: 0 | Status: DISCONTINUED | OUTPATIENT
Start: 2023-05-05 | End: 2023-05-08

## 2023-05-05 RX ORDER — LANOLIN ALCOHOL/MO/W.PET/CERES
3 CREAM (GRAM) TOPICAL AT BEDTIME
Refills: 0 | Status: DISCONTINUED | OUTPATIENT
Start: 2023-05-04 | End: 2023-05-08

## 2023-05-05 RX ORDER — SODIUM BICARBONATE 1 MEQ/ML
1 SYRINGE (ML) INTRAVENOUS
Qty: 0 | Refills: 0 | DISCHARGE

## 2023-05-05 RX ORDER — HEPARIN SODIUM 5000 [USP'U]/ML
5000 INJECTION INTRAVENOUS; SUBCUTANEOUS EVERY 12 HOURS
Refills: 0 | Status: DISCONTINUED | OUTPATIENT
Start: 2023-05-05 | End: 2023-05-08

## 2023-05-05 RX ORDER — ASPIRIN/CALCIUM CARB/MAGNESIUM 324 MG
81 TABLET ORAL DAILY
Refills: 0 | Status: DISCONTINUED | OUTPATIENT
Start: 2023-05-05 | End: 2023-05-08

## 2023-05-05 RX ADMIN — Medication 81 MILLIGRAM(S): at 08:05

## 2023-05-05 RX ADMIN — TIOTROPIUM BROMIDE 2 PUFF(S): 18 CAPSULE ORAL; RESPIRATORY (INHALATION) at 08:53

## 2023-05-05 RX ADMIN — SODIUM CHLORIDE 82 MILLILITER(S): 9 INJECTION, SOLUTION INTRAVENOUS at 16:22

## 2023-05-05 RX ADMIN — BUDESONIDE AND FORMOTEROL FUMARATE DIHYDRATE 2 PUFF(S): 160; 4.5 AEROSOL RESPIRATORY (INHALATION) at 21:13

## 2023-05-05 RX ADMIN — MIDODRINE HYDROCHLORIDE 2.5 MILLIGRAM(S): 2.5 TABLET ORAL at 13:17

## 2023-05-05 RX ADMIN — Medication 1000 UNIT(S): at 08:05

## 2023-05-05 RX ADMIN — Medication 650 MILLIGRAM(S): at 08:05

## 2023-05-05 RX ADMIN — HEPARIN SODIUM 5000 UNIT(S): 5000 INJECTION INTRAVENOUS; SUBCUTANEOUS at 05:49

## 2023-05-05 RX ADMIN — HEPARIN SODIUM 5000 UNIT(S): 5000 INJECTION INTRAVENOUS; SUBCUTANEOUS at 18:20

## 2023-05-05 RX ADMIN — BUDESONIDE AND FORMOTEROL FUMARATE DIHYDRATE 2 PUFF(S): 160; 4.5 AEROSOL RESPIRATORY (INHALATION) at 08:53

## 2023-05-05 RX ADMIN — MEROPENEM 500 MILLIGRAM(S): 1 INJECTION INTRAVENOUS at 17:20

## 2023-05-05 RX ADMIN — TAMSULOSIN HYDROCHLORIDE 0.4 MILLIGRAM(S): 0.4 CAPSULE ORAL at 22:19

## 2023-05-05 RX ADMIN — NEBIVOLOL HYDROCHLORIDE 2.5 MILLIGRAM(S): 5 TABLET ORAL at 05:46

## 2023-05-05 NOTE — CONSULT NOTE ADULT - SUBJECTIVE AND OBJECTIVE BOX
INFECTIOUS DISEASES AND INTERNAL MEDICINE at [place]  =======================================================  Neal Martinez MD  Diplomates American Board of Internal Medicine and Infectious Diseases  Telephone [phone]  Fax            [phone]  =======================================================    MALENA [id number]      HPI:  88 yo male with pmhx CKD4, Urinary retention with chronic Draper (since 3/2023 after hip sx), COPD, GERD, UC s/p total colectomy 20 years ago, HTN presenting to the ED with Fever of 102 at home associated with weakness and chills/rigors. Patient states his Draper was changed in the urologists office yesterday and is declining to have it exchanged here in the ED today. Son is bedside to assist with history as patient is very [place] without hearing aids in. States patient had similar symptoms of chills and feeling unwell about 1 week ago, went to [facility] via EMS, but was feeling better upon arrival and decided to leave without treatment. For the past 1.5 weeks he has been complaining of some burning at the tip of his penis as well. He denies SOB, cough, chest pain, N/V/D. Endorses feeling much better since arriving to the hospital after receiving antibiotics.     AS ABOVE WITH DRAPER  SINCE MARCH FOLLOWS WITH DR JESSICA IN [place]   BLOOD CX POSTIVE KLEBSJOVANNYLLCARL OXYTLEONARDO  ASKED TO EVALUATE FROM ID STANDPOINT       PAST MEDICAL & SURGICAL HISTORY:  HTN (hypertension)      COPD (chronic obstructive pulmonary disease)      Chronic kidney failure, stage 4 (severe)      H/O ulcerative colitis      GERD (gastroesophageal reflux disease)      Previous back surgery      History of cholecystectomy      H/O colectomy      S/P cataract surgery      S/P total right hip arthroplasty          ANTIBIOTICS  meropenem  IVPB 500 milliGRAM(s) IV Intermittent every 24 hours      Allergies    No Known Allergies    Intolerances        SOCIAL HISTORY:     FAMILY HX   FAMILY HISTORY:  FH: stroke (Father)    FH: heart disease (Mother)    FH: diabetes mellitus (Mother)        Vital Signs Last 24 Hrs  T(C): 36.9 (05 May 2023 11:38), Max: 38.3 (04 May 2023 16:41)  T(F): 98.4 (05 May 2023 11:38), Max: 100.9 (04 May 2023 16:41)  HR: 66 (05 May 2023 11:38) (59 - 105)  BP: 104/38 (05 May 2023 11:50) (104/38 - 161/66)  BP(mean): --  RR: 19 (05 May 2023 11:38) (17 - 19)  SpO2: 96% (05 May 2023 11:38) (93% - 98%)    Parameters below as of 05 May 2023 11:38  Patient On (Oxygen Delivery Method): room air      Drug Dosing Weight  Height (cm): 162.6 (04 May 2023 16:41)  Weight (kg): 77.2 (24 Mar 2023 06:14)  BMI (kg/m2): 29.2 (04 May 2023 16:41)  BSA (m2): 1.83 (04 May 2023 16:41)      REVIEW OF SYSTEMS:    CONSTITUTIONAL:  As per HPI.    HEENT:  Eyes:  No diplopia or blurred vision. ENT:  No earache, sore throat or runny nose.    CARDIOVASCULAR:  No pressure, squeezing, strangling, tightness, heaviness or aching about the chest, neck, axilla or epigastrium.    RESPIRATORY:  No cough, shortness of breath, PND or orthopnea.    GASTROINTESTINAL:  No nausea, vomiting or diarrhea.    GENITOURINARY:  No dysuria, frequency or urgency.    MUSCULOSKELETAL:  As per HPI.    SKIN:  No change in skin, hair or nails.    NEUROLOGIC:  No paresthesias, fasciculations, seizures or weakness.                  PHYSICAL EXAMINATION:    GENERAL: The patient is a _____in no apparent distress. ___     VITAL SIGNS: T(C): 36.9 (23 @ 11:38), Max: 38.3 (23 @ 16:41)  HR: 66 (23 @ 11:38) (59 - 105)  BP: 104/38 (23 @ 11:50) (104/38 - 161/66)  RR: 19 (23 @ 11:38) (17 - 19)  SpO2: 96% (23 @ 11:38) (93% - 98%)  Wt(kg): --    HEENT: Head is normocephalic and atraumatic.  ANICTERIC  NECK: Supple. No carotid bruits.  No lymphadenopathy or thyromegaly.    LUNGS:COARSE BREATH SOUNDS    HEART: Regular rate and rhythm without murmur.    ABDOMEN: Soft, nontender, and nondistended.  Positive bowel sounds.  No hepatosplenomegaly was noted. NO REBOUND NO GUARDING    EXTREMITIES: NO EDEMA NO ERYTHEMA    NEUROLOGIC: NON FOCAL      SKIN: No ulceration or induration present. NO RASH        BLOOD CULTURES  Culture Results:   Growth in anaerobic bottle: Gram Negative Rods  Growth in aerobic bottle: Gram Negative Rods ( @ 17:13)  Culture Results:   Growth in anaerobic bottle: Gram Negative Rods  Growth in aerobic bottle: Gram Negative Rods  ***Blood Panel PCR results on this specimen are available  approximately 3 hours after the Gram stain result.***  Gram stain, PCR, and/or culture results may not always  correspond due to difference in methodologies.  ************************************************************  This PCR assay was performed by multiplex PCR. This  Assay tests for 66 bacterial and resistance gene targets.  Please refer to the [facility] test directory  at [email or web address] for details. ( @ 17:12)       URINE CX          LABS:                        9.6    11.34 )-----------( 172      ( 05 May 2023 02:37 )             30.7         141  |  107  |  46.5<H>  ----------------------------<  88  4.4   |  19.0<L>  |  6.79<H>    Ca    9.4      05 May 2023 02:37    TPro  7.1  /  Alb  4.1  /  TBili  0.8  /  DBili  x   /  AST  13  /  ALT  10  /  AlkPhos  70  -    PT/INR - ( 04 May 2023 17:12 )   PT: 12.6 sec;   INR: 1.09 ratio         PTT - ( 04 May 2023 17:12 )  PTT:30.0 sec  Urinalysis Basic - ( 04 May 2023 21:50 )    Color: Yellow / Appearance: Clear / S.010 / pH: x  Gluc: x / Ketone: Negative  / Bili: Negative / Urobili: Negative mg/dL   Blood: x / Protein: 100 / Nitrite: Negative   Leuk Esterase: Moderate / RBC: 11-25 /HPF / WBC 26-50 /HPF   Sq Epi: x / Non Sq Epi: x / Bacteria: Moderate        RADIOLOGY & ADDITIONAL STUDIES:      ASSESSMENT/PLAN    88 yo male with pmhx CKD4, Urinary retention with chronic Draper (since 3/2023 after hip sx), COPD, GERD, UC s/p total colectomy 20 years ago, HTN presenting to the ED with Fever of 102 at home associated with weakness and chills/rigors. Patient states his Draper was changed in the urologists office yesterday and is declining to have it exchanged here in the ED today. Son is bedside to assist with history as patient is very [place] without hearing aids in. States patient had similar symptoms of chills and feeling unwell about 1 week ago, went to [facility] via EMS, but was feeling better upon arrival and decided to leave without treatment. For the past 1.5 weeks he has been complaining of some burning at the tip of his penis as well. He denies SOB, cough, chest pain, N/V/D. Endorses feeling much better since arriving to the hospital after receiving antibiotics.     AS ABOVE WITH DRAPER  SINCE MARCH FOLLOWS WITH DR JESSICA IN [place]   BLOOD CX POSTIVE KLEBSEILLA OXYTOCA  WILL  CHANGE TO MERREM UNTIL SENSITIVITIES BACK  RENAL CONSULT NOTED   OVERALL IMPROVED  WILL FOLLOWUP WITH FURTHER RECOMMENDATIONS               VINCENZO QUIJANO MD INFECTIOUS DISEASES AND INTERNAL MEDICINE at [place]  =======================================================  Neal Martinez MD  Diplomates American Board of Internal Medicine and Infectious Diseases  Telephone [phone]  Fax            [phone]  =======================================================    MALENA [id number]      HPI:  88 yo male with pmhx CKD4, Urinary retention with chronic Draper (since 3/2023 after hip sx), COPD, GERD, UC s/p total colectomy 20 years ago, HTN presenting to the ED with Fever of 102 at home associated with weakness and chills/rigors. Patient states his Draper was changed in the urologists office yesterday and is declining to have it exchanged here in the ED today. Son is bedside to assist with history as patient is very [place] without hearing aids in. States patient had similar symptoms of chills and feeling unwell about 1 week ago, went to [facility] via EMS, but was feeling better upon arrival and decided to leave without treatment. For the past 1.5 weeks he has been complaining of some burning at the tip of his penis as well. He denies SOB, cough, chest pain, N/V/D. Endorses feeling much better since arriving to the hospital after receiving antibiotics.     AS ABOVE WITH DRAPER  SINCE MARCH FOLLOWS WITH DR JESSICA IN [place]   BLOOD CX POSTIVE KLEBSIELLA OXYTOCA  ASKED TO EVALUATE FROM ID STANDPOINT       PAST MEDICAL & SURGICAL HISTORY:  HTN (hypertension)      COPD (chronic obstructive pulmonary disease)      Chronic kidney failure, stage 4 (severe)      H/O ulcerative colitis      GERD (gastroesophageal reflux disease)      Previous back surgery      History of cholecystectomy      H/O colectomy      S/P cataract surgery      S/P total right hip arthroplasty          ANTIBIOTICS  meropenem  IVPB 500 milliGRAM(s) IV Intermittent every 24 hours      Allergies    No Known Allergies    Intolerances        SOCIAL HISTORY:     FAMILY HX   FAMILY HISTORY:  FH: stroke (Father)    FH: heart disease (Mother)    FH: diabetes mellitus (Mother)        Vital Signs Last 24 Hrs  T(C): 36.9 (05 May 2023 11:38), Max: 38.3 (04 May 2023 16:41)  T(F): 98.4 (05 May 2023 11:38), Max: 100.9 (04 May 2023 16:41)  HR: 66 (05 May 2023 11:38) (59 - 105)  BP: 104/38 (05 May 2023 11:50) (104/38 - 161/66)  BP(mean): --  RR: 19 (05 May 2023 11:38) (17 - 19)  SpO2: 96% (05 May 2023 11:38) (93% - 98%)    Parameters below as of 05 May 2023 11:38  Patient On (Oxygen Delivery Method): room air      Drug Dosing Weight  Height (cm): 162.6 (04 May 2023 16:41)  Weight (kg): 77.2 (24 Mar 2023 06:14)  BMI (kg/m2): 29.2 (04 May 2023 16:41)  BSA (m2): 1.83 (04 May 2023 16:41)      REVIEW OF SYSTEMS:    CONSTITUTIONAL:  As per HPI.    HEENT:  Eyes:  No diplopia or blurred vision. ENT:  No earache, sore throat or runny nose.    CARDIOVASCULAR:  No pressure, squeezing, strangling, tightness, heaviness or aching about the chest, neck, axilla or epigastrium.    RESPIRATORY:  No cough, shortness of breath, PND or orthopnea.    GASTROINTESTINAL:  No nausea, vomiting or diarrhea.    GENITOURINARY:  No dysuria, frequency or urgency.    MUSCULOSKELETAL:  As per HPI.    SKIN:  No change in skin, hair or nails.    NEUROLOGIC:  No paresthesias, fasciculations, seizures or weakness.                  PHYSICAL EXAMINATION:    GENERAL: The patient is a _____in no apparent distress. ___     VITAL SIGNS: T(C): 36.9 (23 @ 11:38), Max: 38.3 (23 @ 16:41)  HR: 66 (23 @ 11:38) (59 - 105)  BP: 104/38 (23 @ 11:50) (104/38 - 161/66)  RR: 19 (23 @ 11:38) (17 - 19)  SpO2: 96% (23 @ 11:38) (93% - 98%)  Wt(kg): --    HEENT: Head is normocephalic and atraumatic.  ANICTERIC  NECK: Supple. No carotid bruits.  No lymphadenopathy or thyromegaly.    LUNGS:COARSE BREATH SOUNDS    HEART: Regular rate and rhythm without murmur.    ABDOMEN: Soft, nontender, and nondistended.  Positive bowel sounds.  No hepatosplenomegaly was noted. NO REBOUND NO GUARDINGOSTOMY IN PLACE    EXTREMITIES: NO EDEMA NO ERYTHEMA    NEUROLOGIC: NON FOCAL      SKIN: No ulceration or induration present. NO RASH DRAPER IN PLACE        BLOOD CULTURES  Culture Results:   Growth in anaerobic bottle: Gram Negative Rods  Growth in aerobic bottle: Gram Negative Rods ( @ 17:13)  Culture Results:   Growth in anaerobic bottle: Gram Negative Rods  Growth in aerobic bottle: Gram Negative Rods  ***Blood Panel PCR results on this specimen are available  approximately 3 hours after the Gram stain result.***  Gram stain, PCR, and/or culture results may not always  correspond due to difference in methodologies.  ************************************************************  This PCR assay was performed by multiplex PCR. This  Assay tests for 66 bacterial and resistance gene targets.  Please refer to the [facility] test directory  at [email or web address] for details. ( @ 17:12)       URINE CX          LABS:                        9.6    11.34 )-----------( 172      ( 05 May 2023 02:37 )             30.7         141  |  107  |  46.5<H>  ----------------------------<  88  4.4   |  19.0<L>  |  6.79<H>    Ca    9.4      05 May 2023 02:37    TPro  7.1  /  Alb  4.1  /  TBili  0.8  /  DBili  x   /  AST  13  /  ALT  10  /  AlkPhos  70  -04    PT/INR - ( 04 May 2023 17:12 )   PT: 12.6 sec;   INR: 1.09 ratio         PTT - ( 04 May 2023 17:12 )  PTT:30.0 sec  Urinalysis Basic - ( 04 May 2023 21:50 )    Color: Yellow / Appearance: Clear / S.010 / pH: x  Gluc: x / Ketone: Negative  / Bili: Negative / Urobili: Negative mg/dL   Blood: x / Protein: 100 / Nitrite: Negative   Leuk Esterase: Moderate / RBC: 11-25 /HPF / WBC 26-50 /HPF   Sq Epi: x / Non Sq Epi: x / Bacteria: Moderate        RADIOLOGY & ADDITIONAL STUDIES:      ASSESSMENT/PLAN    88 yo male with pmhx CKD4, Urinary retention with chronic Draper (since 3/2023 after hip sx), COPD, GERD, UC s/p total colectomy 20 years ago, HTN presenting to the ED with Fever of 102 at home associated with weakness and chills/rigors. Patient states his Draper was changed in the urologists office yesterday and is declining to have it exchanged here in the ED today. Son is bedside to assist with history as patient is very [place] without hearing aids in. States patient had similar symptoms of chills and feeling unwell about 1 week ago, went to [facility] via EMS, but was feeling better upon arrival and decided to leave without treatment. For the past 1.5 weeks he has been complaining of some burning at the tip of his penis as well. He denies SOB, cough, chest pain, N/V/D. Endorses feeling much better since arriving to the hospital after receiving antibiotics.     AS ABOVE WITH DRAPER  SINCE MARCH FOLLOWS WITH DR JESSICA IN [place]   BLOOD CX POSTIVE KLEBSEILLA OXYTOCA  WILL  CHANGE TO MERREM UNTIL SENSITIVITIES BACK  RENAL CONSULT NOTED   OVERALL IMPROVED  WILL FOLLOWUP WITH FURTHER RECOMMENDATIONS               VINCENZO QUIJANO MD

## 2023-05-05 NOTE — PROGRESS NOTE ADULT - ASSESSMENT
88 yo male with pmhx CKD4, Urinary retention with chronic De La Rosa (since 3/2023 after hip sx), COPD, GERD, UC s/p total colectomy 20 years ago, HTN presenting to the ED with Fever of 102 at home associated with weakness and chills/rigors.    Sepsis 2/2 CAUTI  -Admit to medicine  -SIRS criteria met WBC 12K, T 100.9   -Lactate 2.6 -> 1.5 after IVF  -CXR with LLL possible infiltrate, however lower lungs on CT showing just atelectasis, which patient states he chronically has from PNA in the past; he has not had any respiratory sxs including SOB, cough  -UA with +LE, pyuria, bacteruria - off De La Rosa catheter that was changed yesterday as patient declined catheter exchange  -CTa/p consistent with UTI showing bladder wall thickening  -Ceftriaxone 1 gm daily for gram neg organisms  -Follow up blood and urine cultures  -Monitor CBC    hypotension - add low dose midodrine    Urinary Retention with Chronic De La Rosa  -Maintain De La Rosa, changed outpatient 5/3/23  -Continue Tamsulosin 0.4 mg qhs    CKD4  -Follows with Dr. Mtz, has been suggested to start HD in the past but patient has declined  - consulted renal   -On Lasix for LE Edema/hx of pulmonary edema 2/2 renal disease  -Cr is at baseline, hold Lasix for now while treating sepsis; monitor volume status  -Continue Sodium bicarb daily  -Avoid nephrotoxic agents and renally dose medications    COPD  -Symbicort and Spiriva in place of non-formulary Anroro Ellipta      HTN  -Continue Nebivolol 2.5 mg daily with hold parameters    DVTppx: Heparin  GOC: Full Code    spoke to wife and explained plan

## 2023-05-05 NOTE — PROGRESS NOTE ADULT - SUBJECTIVE AND OBJECTIVE BOX
MALENA STEIN    [id number]    89y      Male    INTERVAL HPI/OVERNIGHT EVENTS: patient being seen for uti/bacteremia in setting of chronic clement.     patient seen at bedside and states he wants to go home.     REVIEW OF SYSTEMS:    CONSTITUTIONAL: No fever, weight loss, or fatigue  RESPIRATORY: No cough, wheezing, hemoptysis; No shortness of breath  CARDIOVASCULAR: No chest pain, palpitations  GASTROINTESTINAL: No abdominal or epigastric pain. No nausea, vomiting  NEUROLOGICAL: No headaches, memory loss, loss of strength.  MISCELLANEOUS:      Vital Signs Last 24 Hrs  T(C): 36.9 (05 May 2023 11:38), Max: 38.3 (04 May 2023 16:41)  T(F): 98.4 (05 May 2023 11:38), Max: 100.9 (04 May 2023 16:41)  HR: 66 (05 May 2023 11:38) (59 - 105)  BP: 104/38 (05 May 2023 11:50) (104/38 - 161/66)  BP(mean): --  RR: 19 (05 May 2023 11:38) (17 - 19)  SpO2: 96% (05 May 2023 11:38) (93% - 98%)    Parameters below as of 05 May 2023 11:38  Patient On (Oxygen Delivery Method): room air        PHYSICAL EXAM:    General: Age-appearing, in no acute distress, hard of hearing  Cardiovascular: +S1, S2; Regular rate and rhythm, no murmurs, rubs, gallops; 1-2+ pitting edema BL LE (patient/family state this is baseline)  Respiratory: CTA BL, no wheezes, rales, rhonchi  Gastrointestinal: Abdomen soft, non-tender, +BS in all 4 quadrants; ileostomy present, no surrounding erythema or sign of infection  : Clement in place, no suprapubic tenderness  Extremities: No clubbing, cyanosis  Vascular: 2+ pulses, cap refill < 2 seconds  Neuro: Non-focal, AAOx4, sensation intact BL  Musculoskeletal: Normal tone, no deformities  Skin: Warm, dry; no acute rash seen  Psych: Appropriate, cooperative      LABS:                        9.6    11.34 )-----------( 172      ( 05 May 2023 02:37 )             30.7         141  |  107  |  46.5<H>  ----------------------------<  88  4.4   |  19.0<L>  |  6.79<H>    Ca    9.4      05 May 2023 02:37    TPro  7.1  /  Alb  4.1  /  TBili  0.8  /  DBili  x   /  AST  13  /  ALT  10  /  AlkPhos  70  05-04    PT/INR - ( 04 May 2023 17:12 )   PT: 12.6 sec;   INR: 1.09 ratio         PTT - ( 04 May 2023 17:12 )  PTT:30.0 sec  Urinalysis Basic - ( 04 May 2023 21:50 )    Color: Yellow / Appearance: Clear / S.010 / pH: x  Gluc: x / Ketone: Negative  / Bili: Negative / Urobili: Negative mg/dL   Blood: x / Protein: 100 / Nitrite: Negative   Leuk Esterase: Moderate / RBC: 11-25 /HPF / WBC 26-50 /HPF   Sq Epi: x / Non Sq Epi: x / Bacteria: Moderate        MEDICATIONS  (STANDING):  aspirin enteric coated 81 milliGRAM(s) Oral daily  budesonide  80 MICROgram(s)/formoterol 4.5 MICROgram(s) Inhaler 2 Puff(s) Inhalation two times a day  cefTRIAXone Injectable. 1000 milliGRAM(s) IV Push every 24 hours  cholecalciferol 1000 Unit(s) Oral daily  heparin   Injectable 5000 Unit(s) SubCutaneous every 12 hours  nebivolol 2.5 milliGRAM(s) Oral daily  sodium bicarbonate 650 milliGRAM(s) Oral daily  tamsulosin 0.4 milliGRAM(s) Oral at bedtime  tiotropium 2.5 MICROgram(s) Inhaler 2 Puff(s) Inhalation daily    MEDICATIONS  (PRN):  acetaminophen     Tablet .. 650 milliGRAM(s) Oral every 6 hours PRN Temp greater or equal to 38C (100.4F), Mild Pain (1 - 3)  aluminum hydroxide/magnesium hydroxide/simethicone Suspension 30 milliLiter(s) Oral every 4 hours PRN Dyspepsia  melatonin 3 milliGRAM(s) Oral at bedtime PRN Insomnia  ondansetron Injectable 4 milliGRAM(s) IV Push every 8 hours PRN Nausea and/or Vomiting      RADIOLOGY & ADDITIONAL TESTS:

## 2023-05-05 NOTE — CONSULT NOTE ADULT - SUBJECTIVE AND OBJECTIVE BOX
HPI:  88 yo male with pmhx CKD4, Urinary retention with chronic De La Rosa (since 3/2023 after hip sx), COPD, GERD, UC s/p total colectomy 20 years ago, HTN presenting to the ED with Fever of 102 at home associated with weakness and chills/rigors. Patient states his De La Rosa was changed in the urologists office yesterday and is declining to have it exchanged here in the ED today. Son is bedside to assist with history as patient is very [place] without hearing aids in. States patient had similar symptoms of chills and feeling unwell about 1 week ago, went to [facility] via EMS, but was feeling better upon arrival and decided to leave without treatment. For the past 1.5 weeks he has been complaining of some burning at the tip of his penis as well. He denies SOB, cough, chest pain, N/V/D. Endorses feeling much better since arriving to the hospital after receiving antibiotics.        PAST MEDICAL & SURGICAL HISTORY:  HTN (hypertension)      COPD (chronic obstructive pulmonary disease)      Chronic kidney failure, stage 4 (severe)      H/O ulcerative colitis      GERD (gastroesophageal reflux disease)      Previous back surgery      History of cholecystectomy      H/O colectomy      S/P cataract surgery      S/P total right hip arthroplasty      FAMILY HISTORY:  FH: stroke (Father)    FH: heart disease (Mother)    FH: diabetes mellitus (Mother)    [place]    Social History:Non smoker    MEDICATIONS  (STANDING):  aspirin enteric coated 81 milliGRAM(s) Oral daily  budesonide  80 MICROgram(s)/formoterol 4.5 MICROgram(s) Inhaler 2 Puff(s) Inhalation two times a day  cefTRIAXone Injectable. 1000 milliGRAM(s) IV Push every 24 hours  cholecalciferol 1000 Unit(s) Oral daily  heparin   Injectable 5000 Unit(s) SubCutaneous every 12 hours  midodrine. 2.5 milliGRAM(s) Oral three times a day  nebivolol 2.5 milliGRAM(s) Oral daily  sodium bicarbonate 650 milliGRAM(s) Oral daily  tamsulosin 0.4 milliGRAM(s) Oral at bedtime  tiotropium 2.5 MICROgram(s) Inhaler 2 Puff(s) Inhalation daily    MEDICATIONS  (PRN):  acetaminophen     Tablet .. 650 milliGRAM(s) Oral every 6 hours PRN Temp greater or equal to 38C (100.4F), Mild Pain (1 - 3)  aluminum hydroxide/magnesium hydroxide/simethicone Suspension 30 milliLiter(s) Oral every 4 hours PRN Dyspepsia  melatonin 3 milliGRAM(s) Oral at bedtime PRN Insomnia  ondansetron Injectable 4 milliGRAM(s) IV Push every 8 hours PRN Nausea and/or Vomiting   Meds reviewed    Allergies    No Known Allergies      Vital Signs Last 24 Hrs  T(C): 36.9 (05 May 2023 11:38), Max: 38.3 (04 May 2023 16:41)  T(F): 98.4 (05 May 2023 11:38), Max: 100.9 (04 May 2023 16:41)  HR: 66 (05 May 2023 11:38) (59 - 105)  BP: 104/38 (05 May 2023 11:50) (104/38 - 161/66)  BP(mean): --  RR: 19 (05 May 2023 11:38) (17 - 19)  SpO2: 96% (05 May 2023 11:38) (93% - 98%)    Parameters below as of 05 May 2023 11:38  Patient On (Oxygen Delivery Method): room air      Daily Height in cm: 162.56 (04 May 2023 16:41)    Daily     PHYSICAL EXAM:    GENERAL: appears chronically ill  HEAD:  NCAT  EYES: EOMI  NECK: Supple  NERVOUS SYSTEM:  Alert & Oriented X3  CHEST/LUNG: Clear to percussion bilaterally  HEART: Regular rate and rhythm  ABDOMEN: Soft, Nontender, Nondistended; +BS  EXTREMITIES:  1+ B/L LE  edema      LABS:                        9.6    11.34 )-----------( 172      ( 05 May 2023 02:37 )             30.7         141  |  107  |  46.5<H>  ----------------------------<  88  4.4   |  19.0<L>  |  6.79<H>    Ca    9.4      05 May 2023 02:37    TPro  7.1  /  Alb  4.1  /  TBili  0.8  /  DBili  x   /  AST  13  /  ALT  10  /  AlkPhos  70  05-04    PT/INR - ( 04 May 2023 17:12 )   PT: 12.6 sec;   INR: 1.09 ratio         PTT - ( 04 May 2023 17:12 )  PTT:30.0 sec  Urinalysis Basic - ( 04 May 2023 21:50 )    Color: Yellow / Appearance: Clear / S.010 / pH: x  Gluc: x / Ketone: Negative  / Bili: Negative / Urobili: Negative mg/dL   Blood: x / Protein: 100 / Nitrite: Negative   Leuk Esterase: Moderate / RBC: 11-25 /HPF / WBC 26-50 /HPF   Sq Epi: x / Non Sq Epi: x / Bacteria: Moderate              RADIOLOGY & ADDITIONAL TESTS:

## 2023-05-06 LAB
ALBUMIN SERPL ELPH-MCNC: 3.2 G/DL — LOW (ref 3.3–5.2)
ALP SERPL-CCNC: 52 U/L — SIGNIFICANT CHANGE UP (ref 40–120)
ALT FLD-CCNC: 6 U/L — SIGNIFICANT CHANGE UP
ANION GAP SERPL CALC-SCNC: 14 MMOL/L — SIGNIFICANT CHANGE UP (ref 5–17)
AST SERPL-CCNC: 7 U/L — SIGNIFICANT CHANGE UP
BASOPHILS # BLD AUTO: 0.03 K/UL — SIGNIFICANT CHANGE UP (ref 0–0.2)
BASOPHILS NFR BLD AUTO: 0.3 % — SIGNIFICANT CHANGE UP (ref 0–2)
BILIRUB SERPL-MCNC: 0.5 MG/DL — SIGNIFICANT CHANGE UP (ref 0.4–2)
BUN SERPL-MCNC: 46.9 MG/DL — HIGH (ref 8–20)
CALCIUM SERPL-MCNC: 9 MG/DL — SIGNIFICANT CHANGE UP (ref 8.4–10.5)
CHLORIDE SERPL-SCNC: 104 MMOL/L — SIGNIFICANT CHANGE UP (ref 96–108)
CO2 SERPL-SCNC: 20 MMOL/L — LOW (ref 22–29)
CREAT SERPL-MCNC: 6.14 MG/DL — HIGH (ref 0.5–1.3)
EGFR: 8 ML/MIN/1.73M2 — LOW
EOSINOPHIL # BLD AUTO: 0.19 K/UL — SIGNIFICANT CHANGE UP (ref 0–0.5)
EOSINOPHIL NFR BLD AUTO: 2 % — SIGNIFICANT CHANGE UP (ref 0–6)
FERRITIN SERPL-MCNC: 381 NG/ML — SIGNIFICANT CHANGE UP (ref 30–400)
GLUCOSE SERPL-MCNC: 116 MG/DL — HIGH (ref 70–99)
HCT VFR BLD CALC: 27.6 % — LOW (ref 39–50)
HCT VFR BLD CALC: 29.6 % — LOW (ref 39–50)
HGB BLD-MCNC: 8.8 G/DL — LOW (ref 13–17)
HGB BLD-MCNC: 9.3 G/DL — LOW (ref 13–17)
IMM GRANULOCYTES NFR BLD AUTO: 1.8 % — HIGH (ref 0–0.9)
IRON SATN MFR SERPL: 22 UG/DL — LOW (ref 59–158)
IRON SATN MFR SERPL: 9 % — LOW (ref 16–55)
LYMPHOCYTES # BLD AUTO: 1 K/UL — SIGNIFICANT CHANGE UP (ref 1–3.3)
LYMPHOCYTES # BLD AUTO: 10.8 % — LOW (ref 13–44)
MAGNESIUM SERPL-MCNC: 2.3 MG/DL — SIGNIFICANT CHANGE UP (ref 1.6–2.6)
MCHC RBC-ENTMCNC: 29.8 PG — SIGNIFICANT CHANGE UP (ref 27–34)
MCHC RBC-ENTMCNC: 30.2 PG — SIGNIFICANT CHANGE UP (ref 27–34)
MCHC RBC-ENTMCNC: 31.4 GM/DL — LOW (ref 32–36)
MCHC RBC-ENTMCNC: 31.9 GM/DL — LOW (ref 32–36)
MCV RBC AUTO: 94.8 FL — SIGNIFICANT CHANGE UP (ref 80–100)
MCV RBC AUTO: 94.9 FL — SIGNIFICANT CHANGE UP (ref 80–100)
MONOCYTES # BLD AUTO: 1.05 K/UL — HIGH (ref 0–0.9)
MONOCYTES NFR BLD AUTO: 11.3 % — SIGNIFICANT CHANGE UP (ref 2–14)
NEUTROPHILS # BLD AUTO: 6.84 K/UL — SIGNIFICANT CHANGE UP (ref 1.8–7.4)
NEUTROPHILS NFR BLD AUTO: 73.8 % — SIGNIFICANT CHANGE UP (ref 43–77)
PHOSPHATE SERPL-MCNC: 3.9 MG/DL — SIGNIFICANT CHANGE UP (ref 2.4–4.7)
PLATELET # BLD AUTO: 158 K/UL — SIGNIFICANT CHANGE UP (ref 150–400)
PLATELET # BLD AUTO: 170 K/UL — SIGNIFICANT CHANGE UP (ref 150–400)
POTASSIUM SERPL-MCNC: 4.2 MMOL/L — SIGNIFICANT CHANGE UP (ref 3.5–5.3)
POTASSIUM SERPL-SCNC: 4.2 MMOL/L — SIGNIFICANT CHANGE UP (ref 3.5–5.3)
PROT SERPL-MCNC: 5.7 G/DL — LOW (ref 6.6–8.7)
RBC # BLD: 2.91 M/UL — LOW (ref 4.2–5.8)
RBC # BLD: 3.12 M/UL — LOW (ref 4.2–5.8)
RBC # FLD: 13.7 % — SIGNIFICANT CHANGE UP (ref 10.3–14.5)
RBC # FLD: 13.8 % — SIGNIFICANT CHANGE UP (ref 10.3–14.5)
SODIUM SERPL-SCNC: 138 MMOL/L — SIGNIFICANT CHANGE UP (ref 135–145)
TIBC SERPL-MCNC: 243 UG/DL — SIGNIFICANT CHANGE UP (ref 220–430)
TRANSFERRIN SERPL-MCNC: 170 MG/DL — LOW (ref 180–329)
WBC # BLD: 10.55 K/UL — HIGH (ref 3.8–10.5)
WBC # BLD: 9.28 K/UL — SIGNIFICANT CHANGE UP (ref 3.8–10.5)
WBC # FLD AUTO: 10.55 K/UL — HIGH (ref 3.8–10.5)
WBC # FLD AUTO: 9.28 K/UL — SIGNIFICANT CHANGE UP (ref 3.8–10.5)

## 2023-05-06 PROCEDURE — 99233 SBSQ HOSP IP/OBS HIGH 50: CPT

## 2023-05-06 RX ORDER — MIDODRINE HYDROCHLORIDE 2.5 MG/1
2.5 TABLET ORAL THREE TIMES A DAY
Refills: 0 | Status: DISCONTINUED | OUTPATIENT
Start: 2023-05-06 | End: 2023-05-07

## 2023-05-06 RX ORDER — SODIUM CHLORIDE 9 MG/ML
1000 INJECTION, SOLUTION INTRAVENOUS
Refills: 0 | Status: DISCONTINUED | OUTPATIENT
Start: 2023-05-06 | End: 2023-05-07

## 2023-05-06 RX ADMIN — MEROPENEM 500 MILLIGRAM(S): 1 INJECTION INTRAVENOUS at 18:52

## 2023-05-06 RX ADMIN — SODIUM CHLORIDE 82 MILLILITER(S): 9 INJECTION, SOLUTION INTRAVENOUS at 19:46

## 2023-05-06 RX ADMIN — SODIUM CHLORIDE 82 MILLILITER(S): 9 INJECTION, SOLUTION INTRAVENOUS at 05:33

## 2023-05-06 RX ADMIN — HEPARIN SODIUM 5000 UNIT(S): 5000 INJECTION INTRAVENOUS; SUBCUTANEOUS at 18:52

## 2023-05-06 RX ADMIN — MIDODRINE HYDROCHLORIDE 2.5 MILLIGRAM(S): 2.5 TABLET ORAL at 05:14

## 2023-05-06 RX ADMIN — Medication 81 MILLIGRAM(S): at 12:24

## 2023-05-06 RX ADMIN — Medication 650 MILLIGRAM(S): at 12:24

## 2023-05-06 RX ADMIN — Medication 1000 UNIT(S): at 12:24

## 2023-05-06 RX ADMIN — BUDESONIDE AND FORMOTEROL FUMARATE DIHYDRATE 2 PUFF(S): 160; 4.5 AEROSOL RESPIRATORY (INHALATION) at 20:25

## 2023-05-06 RX ADMIN — TIOTROPIUM BROMIDE 2 PUFF(S): 18 CAPSULE ORAL; RESPIRATORY (INHALATION) at 08:17

## 2023-05-06 RX ADMIN — SODIUM CHLORIDE 82 MILLILITER(S): 9 INJECTION, SOLUTION INTRAVENOUS at 12:17

## 2023-05-06 RX ADMIN — HEPARIN SODIUM 5000 UNIT(S): 5000 INJECTION INTRAVENOUS; SUBCUTANEOUS at 05:14

## 2023-05-06 RX ADMIN — NEBIVOLOL HYDROCHLORIDE 2.5 MILLIGRAM(S): 5 TABLET ORAL at 05:14

## 2023-05-06 RX ADMIN — BUDESONIDE AND FORMOTEROL FUMARATE DIHYDRATE 2 PUFF(S): 160; 4.5 AEROSOL RESPIRATORY (INHALATION) at 08:17

## 2023-05-06 RX ADMIN — TAMSULOSIN HYDROCHLORIDE 0.4 MILLIGRAM(S): 0.4 CAPSULE ORAL at 21:02

## 2023-05-06 NOTE — PROGRESS NOTE ADULT - SUBJECTIVE AND OBJECTIVE BOX
Patient is a 89y old  Male who presents with a chief complaint of Sepsis 2/2 CAUTI (06 May 2023 11:34)      pt denies cp,sob,abd pain,flank/suprapubic pain,fever,chill,n/v/d  c/o fatigue  REVIEW OF SYSTEMS: All systems are reviewed and found to be negative except above    MEDICATIONS  (STANDING):  aspirin enteric coated 81 milliGRAM(s) Oral daily  budesonide  80 MICROgram(s)/formoterol 4.5 MICROgram(s) Inhaler 2 Puff(s) Inhalation two times a day  cholecalciferol 1000 Unit(s) Oral daily  heparin   Injectable 5000 Unit(s) SubCutaneous every 12 hours  meropenem Injectable 500 milliGRAM(s) IV Push every 24 hours  midodrine. 2.5 milliGRAM(s) Oral three times a day  nebivolol 2.5 milliGRAM(s) Oral daily  sodium bicarbonate 650 milliGRAM(s) Oral daily  sodium chloride 0.45% 1000 milliLiter(s) (82 mL/Hr) IV Continuous <Continuous>  tamsulosin 0.4 milliGRAM(s) Oral at bedtime  tiotropium 2.5 MICROgram(s) Inhaler 2 Puff(s) Inhalation daily    MEDICATIONS  (PRN):  acetaminophen     Tablet .. 650 milliGRAM(s) Oral every 6 hours PRN Temp greater or equal to 38C (100.4F), Mild Pain (1 - 3)  aluminum hydroxide/magnesium hydroxide/simethicone Suspension 30 milliLiter(s) Oral every 4 hours PRN Dyspepsia  melatonin 3 milliGRAM(s) Oral at bedtime PRN Insomnia  ondansetron Injectable 4 milliGRAM(s) IV Push every 8 hours PRN Nausea and/or Vomiting      CAPILLARY BLOOD GLUCOSE        I&O's Summary    05 May 2023 07:01  -  06 May 2023 07:00  --------------------------------------------------------  IN: 1284 mL / OUT: 1600 mL / NET: -316 mL    06 May 2023 07:01  -  06 May 2023 12:15  --------------------------------------------------------  IN: 0 mL / OUT: 200 mL / NET: -200 mL        PHYSICAL EXAM:  Vital Signs Last 24 Hrs  T(C): 37.3 (06 May 2023 08:45), Max: 37.3 (06 May 2023 08:45)  T(F): 99.1 (06 May 2023 08:45), Max: 99.1 (06 May 2023 08:45)  HR: 68 (06 May 2023 08:45) (56 - 100)  BP: 118/65 (06 May 2023 08:45) (118/65 - 141/64)  BP(mean): --  RR: 18 (06 May 2023 08:45) (18 - 20)  SpO2: 95% (06 May 2023 08:45) (93% - 95%)    Parameters below as of 06 May 2023 08:45  Patient On (Oxygen Delivery Method): room air        CONSTITUTIONAL: NAD,  EYES: PERRLA; conjunctiva and sclera clear  ENMT: Moist oral mucosa,   RESPIRATORY: Normal respiratory effort; lungs are clear to auscultation bilaterally  CARDIOVASCULAR: Regular rate and rhythm, normal S1 and S2, no murmur   EXTS: No lower extremity edema; Peripheral pulses are 2+ bilaterally  ABDOMEN: Nontender to palpation, normoactive bowel sounds, no rebound/guarding;   MUSCLOSKELETAL: ; no joint swelling or tenderness to palpation  PSYCH: affect appropriate  NEUROLOGY: A+O to person, place, and time; CN 2-12 are intact and symmetric; no gross sensory/motor deficits;       LABS:                        8.8    9.28  )-----------( 170      ( 06 May 2023 05:21 )             27.6     05-06    138  |  104  |  46.9<H>  ----------------------------<  116<H>  4.2   |  20.0<L>  |  6.14<H>    Ca    9.0      06 May 2023 05:21  Phos  3.9     05-06  Mg     2.3     05-06    TPro  5.7<L>  /  Alb  3.2<L>  /  TBili  0.5  /  DBili  x   /  AST  7   /  ALT  6   /  AlkPhos  52  05-    PT/INR - ( 04 May 2023 17:12 )   PT: 12.6 sec;   INR: 1.09 ratio         PTT - ( 04 May 2023 17:12 )  PTT:30.0 sec      Urinalysis Basic - ( 04 May 2023 21:50 )    Color: Yellow / Appearance: Clear / S.010 / pH: x  Gluc: x / Ketone: Negative  / Bili: Negative / Urobili: Negative mg/dL   Blood: x / Protein: 100 / Nitrite: Negative   Leuk Esterase: Moderate / RBC: 11-25 /HPF / WBC 26-50 /HPF   Sq Epi: x / Non Sq Epi: x / Bacteria: Moderate        Culture - Blood (collected 04 May 2023 17:13)  Source: .Blood Blood-Peripheral  Gram Stain (05 May 2023 11:26):    Growth in anaerobic bottle: Gram Negative Rods    Growth in aerobic bottle: Gram Negative Rods  Preliminary Report (05 May 2023 11:26):    Growth in anaerobic bottle: Gram Negative Rods    Growth in aerobic bottle: Gram Negative Rods    Culture - Blood (collected 04 May 2023 17:12)  Source: .Blood Blood-Peripheral  Gram Stain (05 May 2023 13:53):    Growth in anaerobic bottle: Gram Negative Rods    Growth in aerobic bottle: Gram Negative Rods  Preliminary Report (06 May 2023 10:47):    Growth in aerobic and anaerobic bottles: Klebsiella oxytoca/Raoultella    ornithinolytica    ***Blood Panel PCR results on this specimen are available    approximately 3 hours after the Gram stain result.***    Gram stain, PCR, and/or culture results may not always    correspond due to difference in methodologies.    ************************************************************    This PCR assay was performed by multiplex PCR. This    Assay tests for 66 bacterial and resistance gene targets.    Please refer to the [facility] test directory    at [email or web address] for details.  Organism: Blood Culture PCR (05 May 2023 13:49)  Organism: Blood Culture PCR (05 May 2023 13:49)        RADIOLOGY & ADDITIONAL TESTS:  Results Reviewed:

## 2023-05-06 NOTE — PROGRESS NOTE ADULT - ASSESSMENT
89 yo male with pmhx CKD4, Urinary retention with chronic De La Rosa (since 3/2023 after hip sx), COPD, GERD, UC s/p total colectomy 20 years ago, HTN presenting to the ED with Fever of 102 at home associated with weakness and chills/rigors.    Sepsis 2/2 CAUTI POA  -bl c/s Klebseilla oxytoca  -abx chnaged to Merrem for now per ID. F/U SENSITIVITY  -SIRS criteria met WBC 12K, T 100.9   -Lactate 2.6 -> 1.5 after IVF  -CXR with LLL possible infiltrate, however lower lungs on CT showing just atelectasis, which patient states he chronically has from PNA in the past; he has not had any respiratory sxs including SOB, cough  -UA with +LE, pyuria, bacteruria - off De La Rosa catheter that was changed yesterday as patient declined catheter exchange  -CTa/p consistent with UTI showing bladder wall thickening  -Follow up FINAL  blood and urine cultures  -Monitor CBC. HB 8.6    hypotension - add low dose midodrine    Urinary Retention with Chronic De La Rosa  -Maintain De La Rosa, changed outpatient 5/3/23  -Continue Tamsulosin 0.4 mg qhs    CKD4  -Follows with Dr. Mtz, has been suggested to start HD in the past but patient has declined  - consulted renal   -On Lasix for LE Edema/hx of pulmonary edema 2/2 renal disease  -Cr is at baseline, hold Lasix for now while treating sepsis; monitor volume status  -Continue Sodium bicarb daily  -Avoid nephrotoxic agents and renally dose medications    COPD  -Symbicort and Spiriva in place of non-formulary Anroro Ellipta      HTN  -Continue Nebivolol 2.5 mg daily with hold parameters    DVTppx: Heparin  GOC: Full Code    spoke to wife and explained plan  89 yo male with pmhx CKD4, Urinary retention with chronic De La Rosa (since 3/2023 after hip sx), COPD, GERD, UC s/p total colectomy 20 years ago, HTN presenting to the ED with Fever of 102 at home associated with weakness and chills/rigors.    Sepsis 2/2 CAUTI POA  -bl c/s Klebseilla oxytoca  -abx chnaged to Merrem for now per ID. F/U SENSITIVITY  -SIRS criteria met WBC 12K, T 100.9   -Lactate 2.6 -> 1.5 after IVF  -CXR with LLL possible infiltrate, however lower lungs on CT showing just atelectasis, which patient states he chronically has from PNA in the past; he has not had any respiratory sxs including SOB, cough  -UA with +LE, pyuria, bacteruria - off De La Rosa catheter that was changed yesterday as patient declined catheter exchange  -CTa/p consistent with UTI showing bladder wall thickening  -Follow up FINAL  blood and urine cultures  -Monitor CBC. HB 8.6    hypotension - add low dose midodrine    Urinary Retention with Chronic De La Rosa  -Maintain De La Rosa, changed outpatient 5/3/23  -Continue Tamsulosin 0.4 mg qhs    CKD4  -Follows with Dr. Mtz, has been suggested to start HD in the past but patient has declined  - consulted renal   -On Lasix for LE Edema/hx of pulmonary edema 2/2 renal disease  -Cr is at baseline, hold Lasix for now while treating sepsis; monitor volume status  -Continue Sodium bicarb daily  -Avoid nephrotoxic agents and renally dose medications  -plan for HD when blood c/s neg  -spoke with nephrology. pt states he is not ready for HD. WIFE/SON at bedside, will talk to pt about HD.    COPD  -Symbicort and Spiriva in place of non-formulary Anroro Ellipta      HTN  -Continue Nebivolol 2.5 mg daily with hold parameters    DVTppx: Heparin  GOC: Full Code    Plan of care dw  Pt/ wife  dw Nephrology,no urgent hd for now until blood c/s negative.

## 2023-05-06 NOTE — PROGRESS NOTE ADULT - SUBJECTIVE AND OBJECTIVE BOX
INFECTIOUS DISEASES AND INTERNAL MEDICINE at [place]  =======================================================  Neal Martinez MD  Diplomates American Board of Internal Medicine and Infectious Diseases  Telephone [phone]  Fax            [phone]  =======================================================    MALENA [id number]      ID f/u- bacteremia  feels well  he is concerned about needing HD in the future    PAST MEDICAL & SURGICAL HISTORY:  HTN (hypertension)      COPD (chronic obstructive pulmonary disease)      Chronic kidney failure, stage 4 (severe)      H/O ulcerative colitis      GERD (gastroesophageal reflux disease)      Previous back surgery      History of cholecystectomy      H/O colectomy      S/P cataract surgery      S/P total right hip arthroplasty          ANTIBIOTICS  meropenem  IVPB 500 milliGRAM(s) IV Intermittent every 24 hours      Allergies    No Known Allergies    Intolerances        SOCIAL HISTORY:     FAMILY HX   FAMILY HISTORY:  FH: stroke (Father)    FH: heart disease (Mother)    FH: diabetes mellitus (Mother)      Vital Signs Last 24 Hrs  T(C): 37.3 (06 May 2023 08:45), Max: 37.3 (06 May 2023 08:45)  T(F): 99.1 (06 May 2023 08:45), Max: 99.1 (06 May 2023 08:45)  HR: 68 (06 May 2023 08:45) (56 - 100)  BP: 118/65 (06 May 2023 08:45) (118/65 - 141/64)  BP(mean): --  RR: 18 (06 May 2023 08:45) (18 - 20)  SpO2: 95% (06 May 2023 08:45) (93% - 95%)    Parameters below as of 06 May 2023 08:45  Patient On (Oxygen Delivery Method): room air        REVIEW OF SYSTEMS:    CONSTITUTIONAL:  As per HPI.    HEENT:  Eyes:  No diplopia or blurred vision. ENT:  No earache, sore throat or runny nose.    CARDIOVASCULAR:  No pressure, squeezing, strangling, tightness, heaviness or aching about the chest, neck, axilla or epigastrium.    RESPIRATORY:  No cough, shortness of breath, PND or orthopnea.    GASTROINTESTINAL:  No nausea, vomiting or diarrhea.    GENITOURINARY:  No dysuria, frequency or urgency.    MUSCULOSKELETAL:  As per HPI.    SKIN:  No change in skin, hair or nails.    NEUROLOGIC:  No paresthesias, fasciculations, seizures or weakness.                  PHYSICAL EXAMINATION:    GENERAL: nad    Vital Signs Last 24 Hrs  T(C): 37.3 (06 May 2023 08:45), Max: 37.3 (06 May 2023 08:45)  T(F): 99.1 (06 May 2023 08:45), Max: 99.1 (06 May 2023 08:45)  HR: 68 (06 May 2023 08:45) (56 - 100)  BP: 118/65 (06 May 2023 08:45) (118/65 - 141/64)  BP(mean): --  RR: 18 (06 May 2023 08:45) (18 - 20)  SpO2: 95% (06 May 2023 08:45) (93% - 95%)    Parameters below as of 06 May 2023 08:45  Patient On (Oxygen Delivery Method): room air      HEENT: Head is normocephalic and atraumatic.  ANICTERIC  NECK: Supple. No carotid bruits.  No lymphadenopathy or thyromegaly.    LUNGS:COARSE BREATH SOUNDS    HEART: Regular rate and rhythm without murmur.    ABDOMEN: Soft, nontender, and nondistended.  Positive bowel sounds.  No hepatosplenomegaly was noted. NO REBOUND NO GUARDING OSTOMY IN PLACE    EXTREMITIES: NO EDEMA NO ERYTHEMA    NEUROLOGIC: NON FOCAL      SKIN: No ulceration or induration present. NO RASH DRAPER IN PLACE        BLOOD CULTURES  Culture Results:   Growth in anaerobic bottle: Gram Negative Rods  Growth in aerobic bottle: Gram Negative Rods ( @ 17:13)  Culture Results:   Growth in anaerobic bottle: Gram Negative Rods  Growth in aerobic bottle: Gram Negative Rods  ***Blood Panel PCR results on this specimen are available  approximately 3 hours after the Gram stain result.***  Gram stain, PCR, and/or culture results may not always  correspond due to difference in methodologies.  ************************************************************  This PCR assay was performed by multiplex PCR. This  Assay tests for 66 bacterial and resistance gene targets.  Please refer to the [facility] Labs test directory  at [email or web address] for details. ( @ 17:12)       URINE CX          LABS:                                             9.3    10.55 )-----------( 158      ( 06 May 2023 12:34 )             29.6       05-06    138  |  104  |  46.9<H>  ----------------------------<  116<H>  4.2   |  20.0<L>  |  6.14<H>    Ca    9.0      06 May 2023 05:21  Phos  3.9     05-06  Mg     2.3     05-06    TPro  5.7<L>  /  Alb  3.2<L>  /  TBili  0.5  /  DBili  x   /  AST  7   /  ALT  6   /  AlkPhos  52  05-06              Urinalysis Basic - ( 04 May 2023 21:50 )    Color: Yellow / Appearance: Clear / S.010 / pH: x  Gluc: x / Ketone: Negative  / Bili: Negative / Urobili: Negative mg/dL   Blood: x / Protein: 100 / Nitrite: Negative   Leuk Esterase: Moderate / RBC: 11-25 /HPF / WBC 26-50 /HPF   Sq Epi: x / Non Sq Epi: x / Bacteria: Moderate        PT/INR - ( 04 May 2023 17:12 )   PT: 12.6 sec;   INR: 1.09 ratio         PTT - ( 04 May 2023 17:12 )  PTT:30.0 sec          CAPILLARY BLOOD GLUCOSE                   PTT - ( 04 May 2023 17:12 )  PTT:30.0 sec  Urinalysis Basic - ( 04 May 2023 21:50 )    Color: Yellow / Appearance: Clear / S.010 / pH: x  Gluc: x / Ketone: Negative  / Bili: Negative / Urobili: Negative mg/dL   Blood: x / Protein: 100 / Nitrite: Negative   Leuk Esterase: Moderate / RBC: 11-25 /HPF / WBC 26-50 /HPF   Sq Epi: x / Non Sq Epi: x / Bacteria: Moderate        RADIOLOGY & ADDITIONAL STUDIES:      ASSESSMENT/PLAN    88 yo male with pmhx CKD4, Urinary retention with chronic Draper (since 3/2023 after hip sx), COPD, GERD, UC s/p total colectomy 20 years ago, HTN presenting to the ED with Fever of 102 at home associated with weakness and chills/rigors. Patient states his Draper was changed in the urologists office yesterday and is declining to have it exchanged here in the ED today. Son is bedside to assist with history as patient is very [place] without hearing aids in. States patient had similar symptoms of chills and feeling unwell about 1 week ago, went to [facility] via EMS, but was feeling better upon arrival and decided to leave without treatment. For the past 1.5 weeks he has been complaining of some burning at the tip of his penis as well. He denies SOB, cough, chest pain, N/V/D. Endorses feeling much better since arriving to the hospital after receiving antibiotics.     AS ABOVE WITH BONNY  SINCE MARCH FOLLOWS WITH DR JESSICA IN [place]     CAUTI  Bacteremia  Fever  CKD 5      BLOOD CX POSITIVE KLEBSIeLLA OXYTOCA f/u (S) likely from CAUTI  per chart draper exchanged 5/3/23  c/w meropenem adjusted for HD  f/u repeat BCX /6  f/u UCX  trend wbc  trend Cr    will f/u

## 2023-05-06 NOTE — PROGRESS NOTE ADULT - SUBJECTIVE AND OBJECTIVE BOX
NEPHROLOGY INTERVAL HPI/OVERNIGHT EVENTS:    Examined  Awake alert feels ok  suspect mild uremic symptoms  De La Rosa w ok UOP  denies HA CP no SOB at rest    MEDICATIONS  (STANDING):  aspirin enteric coated 81 milliGRAM(s) Oral daily  budesonide  80 MICROgram(s)/formoterol 4.5 MICROgram(s) Inhaler 2 Puff(s) Inhalation two times a day  cholecalciferol 1000 Unit(s) Oral daily  heparin   Injectable 5000 Unit(s) SubCutaneous every 12 hours  meropenem Injectable 500 milliGRAM(s) IV Push every 24 hours  midodrine. 2.5 milliGRAM(s) Oral three times a day  nebivolol 2.5 milliGRAM(s) Oral daily  sodium bicarbonate 650 milliGRAM(s) Oral daily  sodium chloride 0.45% 1000 milliLiter(s) (82 mL/Hr) IV Continuous <Continuous>  tamsulosin 0.4 milliGRAM(s) Oral at bedtime  tiotropium 2.5 MICROgram(s) Inhaler 2 Puff(s) Inhalation daily    MEDICATIONS  (PRN):  acetaminophen     Tablet .. 650 milliGRAM(s) Oral every 6 hours PRN Temp greater or equal to 38C (100.4F), Mild Pain (1 - 3)  aluminum hydroxide/magnesium hydroxide/simethicone Suspension 30 milliLiter(s) Oral every 4 hours PRN Dyspepsia  melatonin 3 milliGRAM(s) Oral at bedtime PRN Insomnia  ondansetron Injectable 4 milliGRAM(s) IV Push every 8 hours PRN Nausea and/or Vomiting      Allergies    No Known Allergies    Intolerances        Vital Signs Last 24 Hrs  T(C): 37.3 (06 May 2023 08:45), Max: 37.3 (06 May 2023 08:45)  T(F): 99.1 (06 May 2023 08:45), Max: 99.1 (06 May 2023 08:45)  HR: 68 (06 May 2023 08:45) (56 - 100)  BP: 118/65 (06 May 2023 08:45) (104/38 - 141/64)  BP(mean): --  RR: 18 (06 May 2023 08:45) (18 - 20)  SpO2: 95% (06 May 2023 08:45) (93% - 96%)    Parameters below as of 06 May 2023 08:45  Patient On (Oxygen Delivery Method): room air      Daily     Daily Weight in k.6 (05 May 2023 21:38)    PHYSICAL EXAM:  GENERAL: appears chronically ill  HEAD:  NCAT  EYES: EOMI  NECK: Supple  NERVOUS SYSTEM:  Alert & Oriented X3  CHEST/LUNG: Clear to percussion bilaterally  HEART: Regular rate and rhythm  ABDOMEN: Soft, Nontender, Nondistended; +BS  EXTREMITIES:  1+ B/L LE  edema    LABS:                        8.8    9.28  )-----------( 170      ( 06 May 2023 05:21 )             27.6     05-06    138  |  104  |  46.9<H>  ----------------------------<  116<H>  4.2   |  20.0<L>  |  6.14<H>    Ca    9.0      06 May 2023 05:21  Phos  3.9     05-06  Mg     2.3     -06    TPro  5.7<L>  /  Alb  3.2<L>  /  TBili  0.5  /  DBili  x   /  AST  7   /  ALT  6   /  AlkPhos  52  05-06    PT/INR - ( 04 May 2023 17:12 )   PT: 12.6 sec;   INR: 1.09 ratio         PTT - ( 04 May 2023 17:12 )  PTT:30.0 sec  Urinalysis Basic - ( 04 May 2023 21:50 )    Color: Yellow / Appearance: Clear / S.010 / pH: x  Gluc: x / Ketone: Negative  / Bili: Negative / Urobili: Negative mg/dL   Blood: x / Protein: 100 / Nitrite: Negative   Leuk Esterase: Moderate / RBC: 11-25 /HPF / WBC 26-50 /HPF   Sq Epi: x / Non Sq Epi: x / Bacteria: Moderate      Magnesium, Serum: 2.3 mg/dL ( @ 05:21)  Phosphorus Level, Serum: 3.9 mg/dL ( @ 05:21)          RADIOLOGY & ADDITIONAL TESTS:

## 2023-05-06 NOTE — PROGRESS NOTE ADULT - ASSESSMENT
89 yo male with pmhx CKD4, Urinary retention with chronic Clement (since 3/2023 after hip sx), COPD, GERD, UC s/p total colectomy 20 years ago,   HTN presenting to the ED with Fever of 102 at home associated with weakness and chills/rigors.    CKD stage V  Has advanced renal dz   Follows w Dr Mtz has refused initiation of dialysis in past  MA likely 2/2 CKD  Will adjust IVF add bicarb  No urgent need for  HD currently- but will likely need soon    Now w sepsis   Has + Blood Cx Klebsiella  ID eval appreciated  + Pyuria w lactic acidosis  Has h/o urinary retention  Chronic clement changed on 5/3  Started on Abx Rocephin--> changed to Merrem    Anemia likely 2/2 CKD has low iron stores will need IV iron when infectious process resolves    hypotension - agree w low dose midodrine    Will follow

## 2023-05-07 LAB
-  AMIKACIN: SIGNIFICANT CHANGE UP
-  AMPICILLIN/SULBACTAM: SIGNIFICANT CHANGE UP
-  AMPICILLIN: SIGNIFICANT CHANGE UP
-  AZTREONAM: SIGNIFICANT CHANGE UP
-  CEFAZOLIN: SIGNIFICANT CHANGE UP
-  CEFEPIME: SIGNIFICANT CHANGE UP
-  CEFOXITIN: SIGNIFICANT CHANGE UP
-  CEFTRIAXONE: SIGNIFICANT CHANGE UP
-  CIPROFLOXACIN: SIGNIFICANT CHANGE UP
-  ERTAPENEM: SIGNIFICANT CHANGE UP
-  GENTAMICIN: SIGNIFICANT CHANGE UP
-  IMIPENEM: SIGNIFICANT CHANGE UP
-  LEVOFLOXACIN: SIGNIFICANT CHANGE UP
-  MEROPENEM: SIGNIFICANT CHANGE UP
-  PIPERACILLIN/TAZOBACTAM: SIGNIFICANT CHANGE UP
-  TOBRAMYCIN: SIGNIFICANT CHANGE UP
-  TRIMETHOPRIM/SULFAMETHOXAZOLE: SIGNIFICANT CHANGE UP
ANION GAP SERPL CALC-SCNC: 12 MMOL/L — SIGNIFICANT CHANGE UP (ref 5–17)
BUN SERPL-MCNC: 45.3 MG/DL — HIGH (ref 8–20)
CALCIUM SERPL-MCNC: 8.7 MG/DL — SIGNIFICANT CHANGE UP (ref 8.4–10.5)
CHLORIDE SERPL-SCNC: 106 MMOL/L — SIGNIFICANT CHANGE UP (ref 96–108)
CO2 SERPL-SCNC: 21 MMOL/L — LOW (ref 22–29)
CREAT SERPL-MCNC: 5.85 MG/DL — HIGH (ref 0.5–1.3)
CULTURE RESULTS: SIGNIFICANT CHANGE UP
CULTURE RESULTS: SIGNIFICANT CHANGE UP
EGFR: 9 ML/MIN/1.73M2 — LOW
GLUCOSE SERPL-MCNC: 107 MG/DL — HIGH (ref 70–99)
HCT VFR BLD CALC: 27.1 % — LOW (ref 39–50)
HCT VFR BLD CALC: 28.7 % — LOW (ref 39–50)
HGB BLD-MCNC: 8.6 G/DL — LOW (ref 13–17)
HGB BLD-MCNC: 9 G/DL — LOW (ref 13–17)
MCHC RBC-ENTMCNC: 29.6 PG — SIGNIFICANT CHANGE UP (ref 27–34)
MCHC RBC-ENTMCNC: 30.5 PG — SIGNIFICANT CHANGE UP (ref 27–34)
MCHC RBC-ENTMCNC: 31.4 GM/DL — LOW (ref 32–36)
MCHC RBC-ENTMCNC: 31.7 GM/DL — LOW (ref 32–36)
MCV RBC AUTO: 94.4 FL — SIGNIFICANT CHANGE UP (ref 80–100)
MCV RBC AUTO: 96.1 FL — SIGNIFICANT CHANGE UP (ref 80–100)
METHOD TYPE: SIGNIFICANT CHANGE UP
ORGANISM # SPEC MICROSCOPIC CNT: SIGNIFICANT CHANGE UP
PLATELET # BLD AUTO: 170 K/UL — SIGNIFICANT CHANGE UP (ref 150–400)
PLATELET # BLD AUTO: 171 K/UL — SIGNIFICANT CHANGE UP (ref 150–400)
POTASSIUM SERPL-MCNC: 4.1 MMOL/L — SIGNIFICANT CHANGE UP (ref 3.5–5.3)
POTASSIUM SERPL-SCNC: 4.1 MMOL/L — SIGNIFICANT CHANGE UP (ref 3.5–5.3)
RBC # BLD: 2.82 M/UL — LOW (ref 4.2–5.8)
RBC # BLD: 3.04 M/UL — LOW (ref 4.2–5.8)
RBC # FLD: 13.4 % — SIGNIFICANT CHANGE UP (ref 10.3–14.5)
RBC # FLD: 13.6 % — SIGNIFICANT CHANGE UP (ref 10.3–14.5)
SODIUM SERPL-SCNC: 139 MMOL/L — SIGNIFICANT CHANGE UP (ref 135–145)
SPECIMEN SOURCE: SIGNIFICANT CHANGE UP
SPECIMEN SOURCE: SIGNIFICANT CHANGE UP
WBC # BLD: 8.56 K/UL — SIGNIFICANT CHANGE UP (ref 3.8–10.5)
WBC # BLD: 9.1 K/UL — SIGNIFICANT CHANGE UP (ref 3.8–10.5)
WBC # FLD AUTO: 8.56 K/UL — SIGNIFICANT CHANGE UP (ref 3.8–10.5)
WBC # FLD AUTO: 9.1 K/UL — SIGNIFICANT CHANGE UP (ref 3.8–10.5)

## 2023-05-07 PROCEDURE — 99233 SBSQ HOSP IP/OBS HIGH 50: CPT

## 2023-05-07 RX ORDER — IRON SUCROSE 20 MG/ML
200 INJECTION, SOLUTION INTRAVENOUS EVERY 24 HOURS
Refills: 0 | Status: DISCONTINUED | OUTPATIENT
Start: 2023-05-07 | End: 2023-05-08

## 2023-05-07 RX ORDER — SODIUM CHLORIDE 9 MG/ML
1000 INJECTION, SOLUTION INTRAVENOUS
Refills: 0 | Status: DISCONTINUED | OUTPATIENT
Start: 2023-05-07 | End: 2023-05-08

## 2023-05-07 RX ADMIN — HEPARIN SODIUM 5000 UNIT(S): 5000 INJECTION INTRAVENOUS; SUBCUTANEOUS at 18:14

## 2023-05-07 RX ADMIN — HEPARIN SODIUM 5000 UNIT(S): 5000 INJECTION INTRAVENOUS; SUBCUTANEOUS at 05:07

## 2023-05-07 RX ADMIN — Medication 1000 UNIT(S): at 11:44

## 2023-05-07 RX ADMIN — SODIUM CHLORIDE 82 MILLILITER(S): 9 INJECTION, SOLUTION INTRAVENOUS at 21:42

## 2023-05-07 RX ADMIN — IRON SUCROSE 110 MILLIGRAM(S): 20 INJECTION, SOLUTION INTRAVENOUS at 10:19

## 2023-05-07 RX ADMIN — Medication 30 MILLILITER(S): at 18:08

## 2023-05-07 RX ADMIN — MIDODRINE HYDROCHLORIDE 2.5 MILLIGRAM(S): 2.5 TABLET ORAL at 05:07

## 2023-05-07 RX ADMIN — SODIUM CHLORIDE 82 MILLILITER(S): 9 INJECTION, SOLUTION INTRAVENOUS at 18:08

## 2023-05-07 RX ADMIN — Medication 650 MILLIGRAM(S): at 11:45

## 2023-05-07 RX ADMIN — BUDESONIDE AND FORMOTEROL FUMARATE DIHYDRATE 2 PUFF(S): 160; 4.5 AEROSOL RESPIRATORY (INHALATION) at 10:24

## 2023-05-07 RX ADMIN — MEROPENEM 500 MILLIGRAM(S): 1 INJECTION INTRAVENOUS at 18:08

## 2023-05-07 RX ADMIN — NEBIVOLOL HYDROCHLORIDE 2.5 MILLIGRAM(S): 5 TABLET ORAL at 05:07

## 2023-05-07 RX ADMIN — TIOTROPIUM BROMIDE 2 PUFF(S): 18 CAPSULE ORAL; RESPIRATORY (INHALATION) at 10:24

## 2023-05-07 RX ADMIN — TAMSULOSIN HYDROCHLORIDE 0.4 MILLIGRAM(S): 0.4 CAPSULE ORAL at 21:08

## 2023-05-07 RX ADMIN — Medication 81 MILLIGRAM(S): at 11:44

## 2023-05-07 NOTE — PROGRESS NOTE ADULT - ASSESSMENT
88 yo male with pmhx CKD4, Urinary retention with chronic De La Rosa (since 3/2023 after hip sx), COPD, GERD, UC s/p total colectomy 20 years ago, HTN presenting to the ED with Fever of 102 at home associated with weakness and chills/rigors.    Sepsis 2/2 CAUTI POA  -bl c/s Klebseilla oxytoca  -abx changed to Merrem for now per ID. bl c/s  Sensitive to merrem. f/u ID REC ABOUT ABX on dc  -SIRS criteria met WBC 12K, T 100.9   -Lactate 2.6 -> 1.5 after IVF  -CXR with LLL possible infiltrate, however lower lungs on CT showing just atelectasis, which patient states he chronically has from PNA in the past; he has not had any respiratory sxs including SOB, cough  -UA with +LE, pyuria, bacteruria - off De La Rosa catheter that was changed yesterday as patient declined catheter exchange  -CTa/p consistent with UTI showing bladder wall thickening  -Follow up   blood c/s 05/06  and final urine cultures--GNR  -Monitor CBC.   -F/U OWN UROLOGY OUT PT     hypotension - add low dose midodrine    Urinary Retention with Chronic De La Rosa  -Maintain De La Rosa, changed outpatient 5/3/23  -Continue Tamsulosin 0.4 mg qhs    CKD4  -Follows with Dr. Mtz, has been suggested to start HD in the past but patient has declined  -On Lasix for LE Edema/hx of pulmonary edema 2/2 renal disease  -Cr is at baseline, hold Lasix for now while treating sepsis; monitor volume status  -Continue Sodium bicarb daily  -Avoid nephrotoxic agents and renally dose medications  -plan for HD when blood c/s neg  -spoke with nephrology. pt states he is not ready for HD. WIFE/SON at bedside, will talk to pt about HD.    COPD  -Symbicort and Spiriva in place of non-formulary Anroro Ellipta      HTN  -Continue Nebivolol 2.5 mg daily with hold parameters    DVTppx: Heparin  GOC: Full Code    Plan of care dw  Pt/ wife  dw Nephrology,no urgent hd for now until blood c/s negative.REPEAT BL C/S 05/06 NGTD 89 yo male with pmhx CKD4, Urinary retention with chronic De La Rosa (since 3/2023 after hip sx), COPD, GERD, UC s/p total colectomy 20 years ago, HTN presenting to the ED with Fever of 102 at home associated with weakness and chills/rigors.    Sepsis 2/2 CAUTI POA  -bl c/s Klebseilla oxytoca  -abx changed to Merrem for now per ID. bl c/s  Sensitive to merrem. f/u ID REC ABOUT ABX on dc  -SIRS criteria met WBC 12K, T 100.9   -Lactate 2.6 -> 1.5 after IVF  -CXR with LLL possible infiltrate, however lower lungs on CT showing just atelectasis, which patient states he chronically has from PNA in the past; he has not had any respiratory sxs including SOB, cough  -UA with +LE, pyuria, bacteruria - off De La Rosa catheter that was changed yesterday as patient declined catheter exchange  -CTa/p consistent with UTI showing bladder wall thickening  -Follow up   blood c/s 05/06  and final urine cultures--GNR  -Monitor CBC.   -F/U OWN UROLOGY OUT PT     hypotension - add low dose midodrine    Urinary Retention with Chronic De La Rosa  -Maintain De La Rosa, changed outpatient 5/3/23  -Continue Tamsulosin 0.4 mg qhs    CKD4  -Follows with Dr. Mtz, has been suggested to start HD in the past but patient has declined  -On Lasix for LE Edema/hx of pulmonary edema 2/2 renal disease  -Cr 5.8, hold Lasix for now while treating sepsis; monitor volume status  -Continue Sodium bicarb daily  -Avoid nephrotoxic agents and renally dose medications  -plan for HD when blood c/s neg  -pt agreed with HD    COPD  -Symbicort and Spiriva in place of non-formulary Anroro Ellipta      HTN  -Continue Nebivolol 2.5 mg daily with hold parameters    DVTppx: Heparin  GOC: Full Code    Plan of care dw  Pt  SEUN RN 88 yo male with pmhx CKD4, Urinary retention with chronic De La Rosa (since 3/2023 after hip sx), COPD, GERD, UC s/p total colectomy 20 years ago, HTN presenting to the ED with Fever of 102 at home associated with weakness and chills/rigors.    Sepsis 2/2 CAUTI POA  -bl c/s Klebseilla oxytoca  -abx changed to Merrem for now per ID. bl c/s  Sensitive to merrem. f/u ID REC ABOUT ABX on dc  -SIRS criteria met WBC 12K, T 100.9   -Lactate 2.6 -> 1.5 after IVF  -CXR with LLL possible infiltrate, however lower lungs on CT showing just atelectasis, which patient states he chronically has from PNA in the past; he has not had any respiratory sxs including SOB, cough  -UA with +LE, pyuria, bacteruria - off De La Rosa catheter that was changed yesterday as patient declined catheter exchange  -CTa/p consistent with UTI showing bladder wall thickening  -Follow up   blood c/s 05/06  and final urine cultures--GNR  -Monitor CBC.   -F/U OWN UROLOGY OUT PT     hypotension - add low dose midodrine    Urinary Retention with Chronic De La Rosa  -Maintain De La Rosa, changed outpatient 5/3/23  -Continue Tamsulosin 0.4 mg qhs    CKD4  -Follows with Dr. Mtz, has been suggested to start HD in the past but patient has declined  -On Lasix for LE Edema/hx of pulmonary edema 2/2 renal disease  -Cr 5.8, hold Lasix for now while treating sepsis; monitor volume status  -Continue Sodium bicarb daily  -Avoid nephrotoxic agents and renally dose medications  -plan for HD when blood c/s neg  -pt agreed with HD    COPD  -Symbicort and Spiriva in place of non-formulary Anroro Ellipta      HTN  -Continue Nebivolol 2.5 mg daily with hold parameters    DVTppx: Heparin  GOC: Full Code    Plan of care dw  Pt  spoke with wife -up date plan of care.agreed with above plan  SEUN RN

## 2023-05-07 NOTE — DIETITIAN INITIAL EVALUATION ADULT - ORAL INTAKE PTA/DIET HISTORY
Pt reports fair po intake, no weight changes noted. UBW is 173#. Bedscale weight is same. Pt stating will need HD in future. Stage 2 coccyx ulcer healing noted. Reviewed diet education with pt.

## 2023-05-07 NOTE — DIETITIAN INITIAL EVALUATION ADULT - NSFNSGIIOFT_GEN_A_CORE
05-06-23 @ 07:01  -  05-07-23 @ 07:00  --------------------------------------------------------  OUT:    Ileostomy (mL): 700 mL  Total OUT: 700 mL    Total NET: -700 mL

## 2023-05-07 NOTE — PROGRESS NOTE ADULT - ASSESSMENT
88 yo male with pmhx CKD4, Urinary retention with chronic Clement (since 3/2023 after hip sx), COPD, GERD, UC s/p total colectomy 20 years ago,   HTN presenting to the ED with Fever of 102 at home associated with weakness and chills/rigors.    PANCHO on CKD stage V  Has advanced renal dz   Follows w Dr Mtz has refused initiation of dialysis in past  MA likely 2/2 CKD  Will adjust IVF w bicarb--> cont  Serum Cr improving w IVF will cont  No urgent need for  HD currently- but will likely need soon    Now w sepsis   Has + Blood Cx Klebsiella  ID eval appreciated  + Pyuria w lactic acidosis  Has h/o urinary retention  Chronic clement changed on 5/3  Started on Abx Rocephin--> changed to Merrem    Anemia likely 2/2 CKD has low iron stores will need IV iron when infectious process resolves    hypotension - agree w low dose midodrine    Will follow

## 2023-05-07 NOTE — DIETITIAN INITIAL EVALUATION ADULT - PERTINENT LABORATORY DATA
05-07    139  |  106  |  45.3<H>  ----------------------------<  107<H>  4.1   |  21.0<L>  |  5.85<H>    Ca    8.7      07 May 2023 06:12  Phos  3.9     05-06  Mg     2.3     05-06    TPro  5.7<L>  /  Alb  3.2<L>  /  TBili  0.5  /  DBili  x   /  AST  7   /  ALT  6   /  AlkPhos  52  05-06  A1C with Estimated Average Glucose Result: 5.2 % (03-13-23 @ 15:12)

## 2023-05-07 NOTE — DIETITIAN INITIAL EVALUATION ADULT - PERTINENT MEDS FT
MEDICATIONS  (STANDING):  aspirin enteric coated 81 milliGRAM(s) Oral daily  budesonide  80 MICROgram(s)/formoterol 4.5 MICROgram(s) Inhaler 2 Puff(s) Inhalation two times a day  cholecalciferol 1000 Unit(s) Oral daily  heparin   Injectable 5000 Unit(s) SubCutaneous every 12 hours  iron sucrose IVPB 200 milliGRAM(s) IV Intermittent every 24 hours  meropenem Injectable 500 milliGRAM(s) IV Push every 24 hours  midodrine. 2.5 milliGRAM(s) Oral three times a day  nebivolol 2.5 milliGRAM(s) Oral daily  sodium bicarbonate 650 milliGRAM(s) Oral daily  sodium chloride 0.45% 1000 milliLiter(s) (82 mL/Hr) IV Continuous <Continuous>  tamsulosin 0.4 milliGRAM(s) Oral at bedtime  tiotropium 2.5 MICROgram(s) Inhaler 2 Puff(s) Inhalation daily    MEDICATIONS  (PRN):  acetaminophen     Tablet .. 650 milliGRAM(s) Oral every 6 hours PRN Temp greater or equal to 38C (100.4F), Mild Pain (1 - 3)  aluminum hydroxide/magnesium hydroxide/simethicone Suspension 30 milliLiter(s) Oral every 4 hours PRN Dyspepsia  melatonin 3 milliGRAM(s) Oral at bedtime PRN Insomnia  ondansetron Injectable 4 milliGRAM(s) IV Push every 8 hours PRN Nausea and/or Vomiting

## 2023-05-07 NOTE — PROGRESS NOTE ADULT - SUBJECTIVE AND OBJECTIVE BOX
Culture - Blood (05.04.23 @ 17:12)   - Klebsiella oxytoca: Detec  - Amikacin: S <=16  - Ampicillin: R >16 These ampicillin results predict results for amoxicillin  - Ampicillin/Sulbactam: S 8/4 Enterobacter, Klebsiella aerogenes, Citrobacter, and Serratia may develop resistance during prolonged therapy (3-4 days)  - Aztreonam: S <=4  - Cefazolin: R >16 Enterobacter, Klebsiella aerogenes, Citrobacter, and Serratia may develop resistance during prolonged therapy (3-4 days)  - Cefepime: S <=2  - Cefoxitin: S <=8  - Ceftriaxone: S <=1 Enterobacter, Klebsiella aerogenes, Citrobacter, and Serratia may develop resistance during prolonged therapy  - Ciprofloxacin: S <=0.25  - Ertapenem: S <=0.5  - Gentamicin: S <=2  - Imipenem: S <=1  - Levofloxacin: S <=0.5  - Meropenem: S <=1  - Piperacillin/Tazobactam: S <=8  - Tobramycin: S <=2  - Trimethoprim/Sulfamethoxazole: S <=0.5/9.5  Gram Stain: Culture - Urine (05.04.23 @ 21:50)   Specimen Source: Clean Catch Clean Catch (Midstream)  Culture Results:   >100,000 CFU/ml Gram Negative RodsBasic Metabolic Panel in AM (05.07.23 @ 06:12)   Sodium, Serum: 139 mmol/L  Potassium, Serum: 4.1 mmol/L  Chloride, Serum: 106: Chloride reference range changed from ..10/26/2022 mmol/L  Carbon Dioxide, Serum: 21.0 mmol/L  Anion Gap, Serum: 12 mmol/L  Blood Urea Nitrogen, Serum: 45.3 mg/dL  Creatinine, Serum: 5.85 mg/dL  Glucose, Serum: 107 mg/dL  Calcium, Total Serum: 8.7: Prior Reference Range of 8.6 - 10.2 was amended as of 7/26/2022 to 8.4 -   10.5. mg/dL  eGFR: 9: The estimated glomerular filtration rate (eGFR) is calculated using the  Patient is a 89y old  Male who presents with a chief complaint of Sepsis 2/2 CAUTI (07 May 2023 09:03)  pt denies cp,sob ,abd pain,flank pain,n/v/d,fever,chill    REVIEW OF SYSTEMS: All systems are reviewed and found to be negative except above    MEDICATIONS  (STANDING):  aspirin enteric coated 81 milliGRAM(s) Oral daily  budesonide  80 MICROgram(s)/formoterol 4.5 MICROgram(s) Inhaler 2 Puff(s) Inhalation two times a day  cholecalciferol 1000 Unit(s) Oral daily  heparin   Injectable 5000 Unit(s) SubCutaneous every 12 hours  iron sucrose IVPB 200 milliGRAM(s) IV Intermittent every 24 hours  meropenem Injectable 500 milliGRAM(s) IV Push every 24 hours  midodrine. 2.5 milliGRAM(s) Oral three times a day  nebivolol 2.5 milliGRAM(s) Oral daily  sodium bicarbonate 650 milliGRAM(s) Oral daily  sodium chloride 0.45% 1000 milliLiter(s) (82 mL/Hr) IV Continuous <Continuous>  tamsulosin 0.4 milliGRAM(s) Oral at bedtime  tiotropium 2.5 MICROgram(s) Inhaler 2 Puff(s) Inhalation daily    MEDICATIONS  (PRN):  acetaminophen     Tablet .. 650 milliGRAM(s) Oral every 6 hours PRN Temp greater or equal to 38C (100.4F), Mild Pain (1 - 3)  aluminum hydroxide/magnesium hydroxide/simethicone Suspension 30 milliLiter(s) Oral every 4 hours PRN Dyspepsia  melatonin 3 milliGRAM(s) Oral at bedtime PRN Insomnia  ondansetron Injectable 4 milliGRAM(s) IV Push every 8 hours PRN Nausea and/or Vomiting      CAPILLARY BLOOD GLUCOSE        I&O's Summary    06 May 2023 07:01  -  07 May 2023 07:00  --------------------------------------------------------  IN: 1264 mL / OUT: 2450 mL / NET: -1186 mL        PHYSICAL EXAM:  Vital Signs Last 24 Hrs  T(C): 36.9 (07 May 2023 09:19), Max: 37.1 (06 May 2023 20:15)  T(F): 98.4 (07 May 2023 09:19), Max: 98.8 (06 May 2023 20:15)  HR: 64 (07 May 2023 11:45) (61 - 80)  BP: 130/64 (07 May 2023 11:45) (119/53 - 137/69)  BP(mean): --  RR: 18 (07 May 2023 09:19) (18 - 18)  SpO2: 94% (07 May 2023 10:36) (92% - 97%)    Parameters below as of 07 May 2023 10:36  Patient On (Oxygen Delivery Method): room air        CONSTITUTIONAL: NAD,  EYES: PERRLA; conjunctiva and sclera clear  ENMT: Moist oral mucosa,   RESPIRATORY: Normal respiratory effort; lungs are clear to auscultation bilaterally  CARDIOVASCULAR: Regular rate and rhythm, normal S1 and S2, no murmur   EXTS: No lower extremity edema; Peripheral pulses are 2+ bilaterally  ABDOMEN: Nontender to palpation, normoactive bowel sounds, no rebound/guarding;   MUSCLOSKELETAL: no joint swelling or tenderness to palpation  PSYCH: affect appropriate  NEUROLOGY: A+O to person, place, and time; CN 2-12 are intact and symmetric; no gross sensory/motor deficits;       LABS:                        9.0    9.10  )-----------( 170      ( 07 May 2023 09:26 )             28.7     05-07    139  |  106  |  45.3<H>  ----------------------------<  107<H>  4.1   |  21.0<L>  |  5.85<H>    Ca    8.7      07 May 2023 06:12  Phos  3.9     05-06  Mg     2.3     05-06    TPro  5.7<L>  /  Alb  3.2<L>  /  TBili  0.5  /  DBili  x   /  AST  7   /  ALT  6   /  AlkPhos  52  05-06              Culture - Blood (collected 05 May 2023 12:04)  Source: .Blood Blood  Preliminary Report (06 May 2023 17:01):    No growth to date.    Culture - Blood (collected 05 May 2023 12:00)  Source: .Blood Blood  Preliminary Report (06 May 2023 17:01):    No growth to date.    Culture - Urine (collected 04 May 2023 21:50)  Source: Clean Catch Clean Catch (Midstream)  Preliminary Report (06 May 2023 15:58):    >100,000 CFU/ml Gram Negative Rods    Culture - Blood (collected 04 May 2023 17:13)  Source: .Blood Blood-Peripheral  Gram Stain (05 May 2023 11:26):    Growth in anaerobic bottle: Gram Negative Rods    Growth in aerobic bottle: Gram Negative Rods  Final Report (07 May 2023 06:38):    Growth in aerobic and anaerobic bottles: Klebsiella oxytoca/Raoultella    ornithinolytica    See previous culture  [id number]    Culture - Blood (collected 04 May 2023 17:12)  Source: .Blood Blood-Peripheral  Gram Stain (05 May 2023 13:53):    Growth in anaerobic bottle: Gram Negative Rods    Growth in aerobic bottle: Gram Negative Rods  Final Report (07 May 2023 06:37):    Growth in aerobic and anaerobic bottles: Klebsiella oxytoca/Raoultella    ornithinolytica    ***Blood Panel PCR results on this specimen are available    approximately 3 hours after the Gram stain result.***    Gram stain, PCR, and/or culture results may not always    correspond due to difference in methodologies.    ************************************************************    This PCR assay was performed by multiplex PCR. This    Assay tests for 66 bacterial and resistance gene targets.    Please refer to the [facility] test directory    at [email or web address] for details.  Organism: Blood Culture PCR  Klebsiella oxytoca /Raoutella ornithinolytica (07 May 2023 06:37)  Organism: Klebsiella oxytoca /Raoutella ornithinolytica (07 May 2023 06:37)  Organism: Blood Culture PCR (07 May 2023 06:37)        RADIOLOGY & ADDITIONAL TESTS:  Results Reviewed:         Culture - Blood (05.04.23 @ 17:12)   - Klebsiella oxytoca: Detec  - Amikacin: S <=16  - Ampicillin: R >16 These ampicillin results predict results for amoxicillin  - Ampicillin/Sulbactam: S 8/4 Enterobacter, Klebsiella aerogenes, Citrobacter, and Serratia may develop resistance during prolonged therapy (3-4 days)  - Aztreonam: S <=4  - Cefazolin: R >16 Enterobacter, Klebsiella aerogenes, Citrobacter, and Serratia may develop resistance during prolonged therapy (3-4 days)  - Cefepime: S <=2  - Cefoxitin: S <=8  - Ceftriaxone: S <=1 Enterobacter, Klebsiella aerogenes, Citrobacter, and Serratia may develop resistance during prolonged therapy  - Ciprofloxacin: S <=0.25  - Ertapenem: S <=0.5  - Gentamicin: S <=2  - Imipenem: S <=1  - Levofloxacin: S <=0.5  - Meropenem: S <=1  - Piperacillin/Tazobactam: S <=8  - Tobramycin: S <=2  - Trimethoprim/Sulfamethoxazole: S <=0.5/9.5  Gram Stain: Culture - Urine (05.04.23 @ 21:50)   Specimen Source: Clean Catch Clean Catch (Midstream)  Culture Results:   >100,000 CFU/ml Gram Negative RodsBasic Metabolic Panel in AM (05.07.23 @ 06:12)   Sodium, Serum: 139 mmol/L  Potassium, Serum: 4.1 mmol/L  Chloride, Serum: 106: Chloride reference range changed from ..10/26/2022 mmol/L  Carbon Dioxide, Serum: 21.0 mmol/L  Anion Gap, Serum: 12 mmol/L  Blood Urea Nitrogen, Serum: 45.3 mg/dL  Creatinine, Serum: 5.85 mg/dL  Glucose, Serum: 107 mg/dL  Calcium, Total Serum: 8.7: Prior Reference Range of 8.6 - 10.2 was amended as of 7/26/2022 to 8.4 -   10.5. mg/dL  eGFR: 9: The estimated glomerular filtration rate (eGFR) is calculated using the

## 2023-05-07 NOTE — PROGRESS NOTE ADULT - SUBJECTIVE AND OBJECTIVE BOX
NEPHROLOGY INTERVAL HPI/OVERNIGHT EVENTS:    Examined  Awake alert feels ok  De La Rosa w good UOP  denies HA CP no SOB at rest  Son visiting    MEDICATIONS  (STANDING):  aspirin enteric coated 81 milliGRAM(s) Oral daily  budesonide  80 MICROgram(s)/formoterol 4.5 MICROgram(s) Inhaler 2 Puff(s) Inhalation two times a day  cholecalciferol 1000 Unit(s) Oral daily  heparin   Injectable 5000 Unit(s) SubCutaneous every 12 hours  iron sucrose IVPB 200 milliGRAM(s) IV Intermittent every 24 hours  meropenem Injectable 500 milliGRAM(s) IV Push every 24 hours  midodrine. 2.5 milliGRAM(s) Oral three times a day  nebivolol 2.5 milliGRAM(s) Oral daily  sodium bicarbonate 650 milliGRAM(s) Oral daily  sodium chloride 0.45% 1000 milliLiter(s) (82 mL/Hr) IV Continuous <Continuous>  tamsulosin 0.4 milliGRAM(s) Oral at bedtime  tiotropium 2.5 MICROgram(s) Inhaler 2 Puff(s) Inhalation daily    MEDICATIONS  (PRN):  acetaminophen     Tablet .. 650 milliGRAM(s) Oral every 6 hours PRN Temp greater or equal to 38C (100.4F), Mild Pain (1 - 3)  aluminum hydroxide/magnesium hydroxide/simethicone Suspension 30 milliLiter(s) Oral every 4 hours PRN Dyspepsia  melatonin 3 milliGRAM(s) Oral at bedtime PRN Insomnia  ondansetron Injectable 4 milliGRAM(s) IV Push every 8 hours PRN Nausea and/or Vomiting      Allergies    No Known Allergies    Intolerances        Vital Signs Last 24 Hrs  T(C): 36.9 (07 May 2023 09:19), Max: 37.1 (06 May 2023 20:15)  T(F): 98.4 (07 May 2023 09:19), Max: 98.8 (06 May 2023 20:15)  HR: 64 (07 May 2023 11:45) (61 - 80)  BP: 130/64 (07 May 2023 11:45) (119/53 - 137/69)  BP(mean): --  RR: 18 (07 May 2023 09:19) (18 - 18)  SpO2: 94% (07 May 2023 10:36) (92% - 97%)    Parameters below as of 07 May 2023 10:36  Patient On (Oxygen Delivery Method): room air      PHYSICAL EXAM:  GENERAL: appears chronically ill  HEAD:  NCAT  EYES: EOMI  NECK: Supple  NERVOUS SYSTEM:  Alert & Oriented X3  CHEST/LUNG: Clear to percussion bilaterally  HEART: Regular rate and rhythm  ABDOMEN: Soft, Nontender, Nondistended; +BS  EXTREMITIES:  1+ B/L LE  edema    LABS:                        9.0    9.10  )-----------( 170      ( 07 May 2023 09:26 )             28.7     05-07    139  |  106  |  45.3<H>  ----------------------------<  107<H>  4.1   |  21.0<L>  |  5.85<H>    Ca    8.7      07 May 2023 06:12  Phos  3.9     05-06  Mg     2.3     05-06    TPro  5.7<L>  /  Alb  3.2<L>  /  TBili  0.5  /  DBili  x   /  AST  7   /  ALT  6   /  AlkPhos  52  05-06                RADIOLOGY & ADDITIONAL TESTS:

## 2023-05-07 NOTE — DIETITIAN INITIAL EVALUATION ADULT - OTHER INFO
89 yo male with pmhx CKD4, Urinary retention with chronic De La Rosa (since 3/2023 after hip sx), COPD, GERD, UC s/p total colectomy 20 years ago, HTN presenting to the ED with Fever of 102 at home associated with weakness and chills/rigors.

## 2023-05-07 NOTE — DIETITIAN INITIAL EVALUATION ADULT - NSFNSPHYEXAMSKINFT_GEN_A_CORE
Pressure Injury 1: coccyx, none  Pressure Injury 2: none, none  Pressure Injury 3: none, none  Pressure Injury 4: none, none  Pressure Injury 5: none, none  Pressure Injury 6: none, none  Pressure Injury 7: none, none  Pressure Injury 8: none, none  Pressure Injury 9: none, none  Pressure Injury 10: none, none  Pressure Injury 11: none, none, Pressure Injury 1: coccyx stage 2 ulcer healing., Stage II  Pressure Injury 2: none, none  Pressure Injury 3: none, none  Pressure Injury 4: none, none  Pressure Injury 5: none, none  Pressure Injury 6: none, none  Pressure Injury 7: none, none  Pressure Injury 8: none, none  Pressure Injury 9: none, none  Pressure Injury 10: none, none  Pressure Injury 11: none, none

## 2023-05-08 ENCOUNTER — TRANSCRIPTION ENCOUNTER (OUTPATIENT)
Age: 88
End: 2023-05-08

## 2023-05-08 VITALS
OXYGEN SATURATION: 96 % | HEART RATE: 74 BPM | RESPIRATION RATE: 18 BRPM | TEMPERATURE: 97 F | SYSTOLIC BLOOD PRESSURE: 160 MMHG | DIASTOLIC BLOOD PRESSURE: 82 MMHG

## 2023-05-08 LAB
-  AMIKACIN: SIGNIFICANT CHANGE UP
-  AMOXICILLIN/CLAVULANIC ACID: SIGNIFICANT CHANGE UP
-  AMPICILLIN/SULBACTAM: SIGNIFICANT CHANGE UP
-  AMPICILLIN: SIGNIFICANT CHANGE UP
-  AZTREONAM: SIGNIFICANT CHANGE UP
-  CEFAZOLIN: SIGNIFICANT CHANGE UP
-  CEFEPIME: SIGNIFICANT CHANGE UP
-  CEFOXITIN: SIGNIFICANT CHANGE UP
-  CEFTRIAXONE: SIGNIFICANT CHANGE UP
-  CIPROFLOXACIN: SIGNIFICANT CHANGE UP
-  ERTAPENEM: SIGNIFICANT CHANGE UP
-  GENTAMICIN: SIGNIFICANT CHANGE UP
-  IMIPENEM: SIGNIFICANT CHANGE UP
-  LEVOFLOXACIN: SIGNIFICANT CHANGE UP
-  MEROPENEM: SIGNIFICANT CHANGE UP
-  NITROFURANTOIN: SIGNIFICANT CHANGE UP
-  PIPERACILLIN/TAZOBACTAM: SIGNIFICANT CHANGE UP
-  TOBRAMYCIN: SIGNIFICANT CHANGE UP
-  TRIMETHOPRIM/SULFAMETHOXAZOLE: SIGNIFICANT CHANGE UP
ANION GAP SERPL CALC-SCNC: 14 MMOL/L — SIGNIFICANT CHANGE UP (ref 5–17)
BUN SERPL-MCNC: 45.7 MG/DL — HIGH (ref 8–20)
CALCIUM SERPL-MCNC: 9.4 MG/DL — SIGNIFICANT CHANGE UP (ref 8.4–10.5)
CHLORIDE SERPL-SCNC: 103 MMOL/L — SIGNIFICANT CHANGE UP (ref 96–108)
CO2 SERPL-SCNC: 21 MMOL/L — LOW (ref 22–29)
CREAT SERPL-MCNC: 5.36 MG/DL — HIGH (ref 0.5–1.3)
CULTURE RESULTS: SIGNIFICANT CHANGE UP
EGFR: 10 ML/MIN/1.73M2 — LOW
GLUCOSE SERPL-MCNC: 106 MG/DL — HIGH (ref 70–99)
HCT VFR BLD CALC: 29.9 % — LOW (ref 39–50)
HGB BLD-MCNC: 9.5 G/DL — LOW (ref 13–17)
MCHC RBC-ENTMCNC: 30.7 PG — SIGNIFICANT CHANGE UP (ref 27–34)
MCHC RBC-ENTMCNC: 31.8 GM/DL — LOW (ref 32–36)
MCV RBC AUTO: 96.8 FL — SIGNIFICANT CHANGE UP (ref 80–100)
METHOD TYPE: SIGNIFICANT CHANGE UP
ORGANISM # SPEC MICROSCOPIC CNT: SIGNIFICANT CHANGE UP
ORGANISM # SPEC MICROSCOPIC CNT: SIGNIFICANT CHANGE UP
PLATELET # BLD AUTO: 204 K/UL — SIGNIFICANT CHANGE UP (ref 150–400)
POTASSIUM SERPL-MCNC: 4.4 MMOL/L — SIGNIFICANT CHANGE UP (ref 3.5–5.3)
POTASSIUM SERPL-SCNC: 4.4 MMOL/L — SIGNIFICANT CHANGE UP (ref 3.5–5.3)
RBC # BLD: 3.09 M/UL — LOW (ref 4.2–5.8)
RBC # FLD: 13.5 % — SIGNIFICANT CHANGE UP (ref 10.3–14.5)
SODIUM SERPL-SCNC: 138 MMOL/L — SIGNIFICANT CHANGE UP (ref 135–145)
SPECIMEN SOURCE: SIGNIFICANT CHANGE UP
WBC # BLD: 11.16 K/UL — HIGH (ref 3.8–10.5)
WBC # FLD AUTO: 11.16 K/UL — HIGH (ref 3.8–10.5)

## 2023-05-08 PROCEDURE — 96375 TX/PRO/DX INJ NEW DRUG ADDON: CPT

## 2023-05-08 PROCEDURE — 84466 ASSAY OF TRANSFERRIN: CPT

## 2023-05-08 PROCEDURE — 82435 ASSAY OF BLOOD CHLORIDE: CPT

## 2023-05-08 PROCEDURE — 83540 ASSAY OF IRON: CPT

## 2023-05-08 PROCEDURE — 85027 COMPLETE CBC AUTOMATED: CPT

## 2023-05-08 PROCEDURE — 84132 ASSAY OF SERUM POTASSIUM: CPT

## 2023-05-08 PROCEDURE — 99232 SBSQ HOSP IP/OBS MODERATE 35: CPT

## 2023-05-08 PROCEDURE — 82803 BLOOD GASES ANY COMBINATION: CPT

## 2023-05-08 PROCEDURE — 87086 URINE CULTURE/COLONY COUNT: CPT

## 2023-05-08 PROCEDURE — 81001 URINALYSIS AUTO W/SCOPE: CPT

## 2023-05-08 PROCEDURE — 96374 THER/PROPH/DIAG INJ IV PUSH: CPT

## 2023-05-08 PROCEDURE — 74176 CT ABD & PELVIS W/O CONTRAST: CPT | Mod: MG

## 2023-05-08 PROCEDURE — G1004: CPT

## 2023-05-08 PROCEDURE — 87150 DNA/RNA AMPLIFIED PROBE: CPT

## 2023-05-08 PROCEDURE — 80053 COMPREHEN METABOLIC PANEL: CPT

## 2023-05-08 PROCEDURE — 83605 ASSAY OF LACTIC ACID: CPT

## 2023-05-08 PROCEDURE — 83550 IRON BINDING TEST: CPT

## 2023-05-08 PROCEDURE — 87186 SC STD MICRODIL/AGAR DIL: CPT

## 2023-05-08 PROCEDURE — 82728 ASSAY OF FERRITIN: CPT

## 2023-05-08 PROCEDURE — 85730 THROMBOPLASTIN TIME PARTIAL: CPT

## 2023-05-08 PROCEDURE — 87040 BLOOD CULTURE FOR BACTERIA: CPT

## 2023-05-08 PROCEDURE — 99239 HOSP IP/OBS DSCHRG MGMT >30: CPT

## 2023-05-08 PROCEDURE — 83735 ASSAY OF MAGNESIUM: CPT

## 2023-05-08 PROCEDURE — 36415 COLL VENOUS BLD VENIPUNCTURE: CPT

## 2023-05-08 PROCEDURE — 87077 CULTURE AEROBIC IDENTIFY: CPT

## 2023-05-08 PROCEDURE — 85018 HEMOGLOBIN: CPT

## 2023-05-08 PROCEDURE — 94640 AIRWAY INHALATION TREATMENT: CPT

## 2023-05-08 PROCEDURE — 84295 ASSAY OF SERUM SODIUM: CPT

## 2023-05-08 PROCEDURE — 80048 BASIC METABOLIC PNL TOTAL CA: CPT

## 2023-05-08 PROCEDURE — 84100 ASSAY OF PHOSPHORUS: CPT

## 2023-05-08 PROCEDURE — 51702 INSERT TEMP BLADDER CATH: CPT

## 2023-05-08 PROCEDURE — 82330 ASSAY OF CALCIUM: CPT

## 2023-05-08 PROCEDURE — 99285 EMERGENCY DEPT VISIT HI MDM: CPT | Mod: 25

## 2023-05-08 PROCEDURE — 85025 COMPLETE CBC W/AUTO DIFF WBC: CPT

## 2023-05-08 PROCEDURE — 85014 HEMATOCRIT: CPT

## 2023-05-08 PROCEDURE — 71045 X-RAY EXAM CHEST 1 VIEW: CPT

## 2023-05-08 PROCEDURE — 85610 PROTHROMBIN TIME: CPT

## 2023-05-08 PROCEDURE — 93005 ELECTROCARDIOGRAM TRACING: CPT

## 2023-05-08 PROCEDURE — 82947 ASSAY GLUCOSE BLOOD QUANT: CPT

## 2023-05-08 RX ORDER — FUROSEMIDE 40 MG
1 TABLET ORAL
Qty: 0 | Refills: 0 | DISCHARGE

## 2023-05-08 RX ORDER — CEFPODOXIME PROXETIL 100 MG
100 TABLET ORAL EVERY 12 HOURS
Refills: 0 | Status: DISCONTINUED | OUTPATIENT
Start: 2023-05-08 | End: 2023-05-08

## 2023-05-08 RX ORDER — CEFPODOXIME PROXETIL 100 MG
1 TABLET ORAL
Qty: 14 | Refills: 0
Start: 2023-05-08 | End: 2023-05-21

## 2023-05-08 RX ORDER — CEFPODOXIME PROXETIL 100 MG
200 TABLET ORAL EVERY 24 HOURS
Refills: 0 | Status: DISCONTINUED | OUTPATIENT
Start: 2023-05-08 | End: 2023-05-08

## 2023-05-08 RX ORDER — ACETAMINOPHEN 500 MG
2 TABLET ORAL
Qty: 0 | Refills: 0 | DISCHARGE
Start: 2023-05-08

## 2023-05-08 RX ADMIN — IRON SUCROSE 110 MILLIGRAM(S): 20 INJECTION, SOLUTION INTRAVENOUS at 11:59

## 2023-05-08 RX ADMIN — TIOTROPIUM BROMIDE 2 PUFF(S): 18 CAPSULE ORAL; RESPIRATORY (INHALATION) at 09:15

## 2023-05-08 RX ADMIN — Medication 30 MILLILITER(S): at 00:32

## 2023-05-08 RX ADMIN — Medication 1000 UNIT(S): at 11:59

## 2023-05-08 RX ADMIN — NEBIVOLOL HYDROCHLORIDE 2.5 MILLIGRAM(S): 5 TABLET ORAL at 05:38

## 2023-05-08 RX ADMIN — Medication 81 MILLIGRAM(S): at 11:59

## 2023-05-08 RX ADMIN — BUDESONIDE AND FORMOTEROL FUMARATE DIHYDRATE 2 PUFF(S): 160; 4.5 AEROSOL RESPIRATORY (INHALATION) at 09:15

## 2023-05-08 RX ADMIN — HEPARIN SODIUM 5000 UNIT(S): 5000 INJECTION INTRAVENOUS; SUBCUTANEOUS at 05:38

## 2023-05-08 RX ADMIN — Medication 650 MILLIGRAM(S): at 11:59

## 2023-05-08 NOTE — PHYSICAL THERAPY INITIAL EVALUATION ADULT - GAIT PATTERN USED, PT EVAL
decreased gait velocity and activity, declined a trial with a single axis cane, states he is too tired, feels fine with the RW

## 2023-05-08 NOTE — PROGRESS NOTE ADULT - ASSESSMENT
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 Admitted for sepsis secondary to complicated UTI with clement in place. Inially refused clement exchange. Urine and blood cultures positive for Klebseilla. Repeat blood cultures negative. CT abdomen and pelvis with bladder wall thickening. DIscussed with ID changed to PO Vantin x 14 days. Urology consulted for clement exchange. PT consulted.     Assessment/Plan:    1. Sepsis present on admission secondary to CAUTI  - Sepsis resolved  - Blood cultures initially positive for klebsiella, repeat blood cultures negative  - Change meropenem to PO Vantin x 14 days; discussed with ID  - Urology consulted for clement change    2. CHronic urinary retention  - Clement to be changed by urology  - Flomax 0.4 mg OD     3. PANCHO with CKD stage 5 secondary to sepsis  - Improving  - Patient refusing HD CKD4  - Lasix was held for PANCHO and sepsis on admission   -Continue Sodium bicarb daily  -Avoid nephrotoxic agents and renally dose medications    4. COPD  -Symbicort and Spiriva in place of non-formulary Anroro Ellipta      5. Hypertension  -Continue Nebivolol 2.5 mg daily with hold parameters    VTE- Heparin subcut  GOC: Full Code    Plan of care discussed with patient, ID and son at bedside. TO be discharged home on PO antibiotics pending PT evaluation and clement change by urology      The patient is an 89 year old male with a past medical history of CKD stage 5, chronic urinary retention with a clement in place, COPD, gerd, UC UC s/p total colectomy 20 years ago, HTN presenting to the ED with Fever of 102 at home associated with weakness and chills/rigors. Admitted for sepsis secondary to complicated UTI with clement in place. Inially refused clement exchange. Urine and blood cultures positive for Klebseilla. Repeat blood cultures negative. CT abdomen and pelvis with bladder wall thickening. DIscussed with ID changed to PO Vantin x 14 days. Urology consulted for clement exchange. PT consulted.     Assessment/Plan:    1. Sepsis present on admission secondary to CAUTI  - Sepsis resolved  - Blood cultures initially positive for klebsiella, repeat blood cultures negative  - Change meropenem to PO Vantin x 14 days; discussed with ID  - Urology consulted for clement change    2. CHronic urinary retention  - Clement to be changed by urology  - Flomax 0.4 mg OD     3. PANCHO with CKD stage 5 secondary to sepsis  - Improving  - Patient refusing HD CKD4  - Lasix was held for PANCHO and sepsis on admission   -Continue Sodium bicarb daily  -Avoid nephrotoxic agents and renally dose medications    4. COPD  -Symbicort and Spiriva in place of non-formulary Anroro Ellipta      5. Hypertension  -Continue Nebivolol 2.5 mg daily with hold parameters    VTE- Heparin subcut  GOC: Full Code    PT - Home with rolling walker     Plan of care discussed with patient, ID and son at bedside. TO be discharged home on PO antibiotics pending clement change by urology

## 2023-05-08 NOTE — CHART NOTE - NSCHARTNOTEFT_GEN_A_CORE
UROLOGY F/U REQUESTED FOR CHANGE OF CATHETER:  Patient apparently refused anyone but urology team to change clement.   Clement changed and new 16F coude with 10cc balloon placed   See procedure note for full report.    Plan:  continue present care and management as per medicine.

## 2023-05-08 NOTE — PROGRESS NOTE ADULT - PROVIDER SPECIALTY LIST ADULT
Infectious Disease
Nephrology
Infectious Disease
Internal Medicine
Hospitalist
Nephrology
Nephrology

## 2023-05-08 NOTE — DISCHARGE NOTE PROVIDER - CARE PROVIDER_API CALL
Felice Constantino)  Infectious Disease; Internal Medicine  [address] [place] [address]  Phone: [phone]  Fax: [phone]  Follow Up Time: 1 week

## 2023-05-08 NOTE — DISCHARGE NOTE PROVIDER - NSDCCPCAREPLAN_GEN_ALL_CORE_FT
PRINCIPAL DISCHARGE DIAGNOSIS  Diagnosis: Gram-negative sepsis, unspecified  Assessment and Plan of Treatment: Secondary to bacteremia and UTI now resolved  PO Vantin as prescribed for 14 days  FOllow up with Dr Constantino in 1 week      SECONDARY DISCHARGE DIAGNOSES  Diagnosis: UTI due to Klebsiella species  Assessment and Plan of Treatment: PO Vantin as prescribed for 14 days  FOllow up with Dr Constantino in 1 week    Diagnosis: Acute kidney injury superimposed on CKD  Assessment and Plan of Treatment: Secondary to sepsis and infection  REnal funcion stable  Follow up with nephrologist in 1 week for repeat blood work

## 2023-05-08 NOTE — DISCHARGE NOTE PROVIDER - HOSPITAL COURSE
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 Admitted for sepsis secondary to complicated UTI with clement in place. Inially refused clement exchange. Urine and blood cultures positive for Klebseilla. Repeat blood cultures negative. CT abdomen and pelvis with bladder wall thickening. DIscussed with ID changed to PO Vantin x 14 days. Urology consulted for clement exchange. PT consulted.      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 Admitted for sepsis secondary to complicated UTI with clement in place. Inially refused clement exchange. Urine and blood cultures positive for Klebseilla. Repeat blood cultures negative. CT abdomen and pelvis with bladder wall thickening. DIscussed with ID changed to PO Vantin x 14 days. Urology consulted, clement changed prior to discharge. Seen   by PT. Discharged home with home care to follow up with urology, ID and nephrology

## 2023-05-08 NOTE — DISCHARGE NOTE PROVIDER - NSDCMRMEDTOKEN_GEN_ALL_CORE_FT
TRISH KERR    Patient Age: 67 year old   Interpreting service used: No    Insurance on file confirmed with caller: Yes    Patient seen within 1 year by a provider in primary care? Yes-      Refill to be: ePrescribed    Medication requested to be refilled: See Pended Medication    Addition Information: one day left of levothyroxine/ patient was also rescheduled per clinical cancellation and scheduled for Jan 2023    Does patient have enough to medication for 72 business hours? No- Route message to provider clinical pool- HIGH PRIORITY    Caller informed to check with the pharmacy later for their refill.  If problems arise, we will contact patient.    Message read back to caller for accuracy: Yes     WEIGHT AND HEIGHT:   Wt Readings from Last 1 Encounters:   09/01/22 81.2 kg (179 lb)     Ht Readings from Last 1 Encounters:   09/01/22 5' 2\" (1.575 m)     BMI Readings from Last 1 Encounters:   09/01/22 32.74 kg/m²       ALLERGIES:  Patient has no known allergies.  Current Outpatient Medications   Medication Sig Dispense Refill   • levothyroxine 75 MCG tablet TAKE 1 TABLET BY MOUTH DAILY 90 tablet 0   • tiZANidine (ZANAFLEX) 2 MG tablet Take 1 tablet by mouth nightly as needed for Muscle spasms. 30 tablet 0   • ibuprofen (MOTRIN) 800 MG tablet      • glimepiride (AMARYL) 4 MG tablet TAKE 1 TABLET BY MOUTH IN THE MORNING AND BEFORE BEDTIME 180 tablet 0   • empagliflozin (JARDIANCE) 25 MG tablet Take 1 tablet by mouth daily (before breakfast). 90 tablet 1   • metformin (GLUCOPHAGE) 1000 MG tablet Take 1 tablet by mouth in the morning and 1 tablet in the evening. Take with meals. 180 tablet 0   • insulin glargine (Lantus SoloStar) 100 UNIT/ML pen-injector INJECT 32 UNITS EVERY EVENING 15 mL 3   • lisinopril (ZESTRIL) 5 MG tablet Take 1 tablet by mouth daily. 90 tablet 3   • omeprazole (PriLOSEC) 40 MG capsule Take 1 capsule by mouth daily. 90 capsule 3   • Lancets Misc Test 2X daily Dx DM TYPE II-UNCOMPL E11.9  Insulin:yes Accu-Chek 200 each 3   • atorvastatin (LIPITOR) 20 MG tablet TAKE 1 TABLET BY MOUTH DAILY 90 tablet 3   • blood glucose (Accu-Chek SmartView) test strip Test blood sugar 2 times daily as directed. Diagnosis: E11.9. INSULIN DEPENDANT:  strip 3   • Insulin Pen Needle (PEN NEEDLES) 32G X 4 MM Misc Use with daily injection     • gabapentin (NEURONTIN) 600 MG tablet 600 mg.     • Alcohol Swabs (B-D SINGLE USE SWABS REGULAR) Pads Use as directed     • Blood Glucose Monitoring Suppl (ACCU-CHEK FREDRICK SMARTVIEW) w/Device Kit Test 2X daily Dx DM TYPE II-UNCOMPL E11.9 Insulin:yes Accu-Chek Fredrick Smartview     • Insulin Syringe-Needle U-100 31G X 15/64\" 1 ML Misc Use with Lantus nightly       No current facility-administered medications for this visit.         CALL BACK INFO: Ok to leave response (including medical information) on answering machine        PCP: Kathy Cobb MD         INS: Payor: AARP MEDICARE ADVANTAGE / Plan: AARP MEDICARE ADVANTAGE PPO / Product Type: MEDADV   PATIENT ADDRESS:  [address] [place] [address] [place] [address]   acetaminophen 325 mg oral tablet: 2 tab(s) orally every 6 hours As needed Temp greater or equal to 38C (100.4F), Mild Pain (1 - 3)  Anoro Ellipta 62.5 mcg-25 mcg/inh inhalation powder: 1 puff(s) inhaled once a day  Aspir 81 oral delayed release tablet: 1 orally 2 times a day  cefpodoxime 200 mg oral tablet: 1 tab(s) orally every 24 hours  furosemide 40 mg oral tablet: 1 tab(s) orally once a day REume Lasix on 05/09/2023  Nebivolol 2.5 mg oral tablet: 1 tab(s) orally once a day  sodium bicarbonate 650 mg oral tablet: 1 tab(s) orally once a day  tamsulosin 0.4 mg oral capsule: 1 cap(s) orally once a day  Vitamin D3 25 mcg (1000 intl units) oral tablet: 1 tab(s) orally once a day

## 2023-05-08 NOTE — DISCHARGE NOTE PROVIDER - NSDCFUSCHEDAPPT_GEN_ALL_CORE_FT
Elebiary, Yeon J  [facility]  ORTHOSURG [address]  Scheduled Appointment: 05/16/2023    Felice Constantino  [facility]  INTMED [address] [facility]  Scheduled Appointment: 06/08/2023    Alessandro Sarmiento  [facility]  ORTHOSURG [address]  Scheduled Appointment: 06/21/2023    Alessandro Ward  [facility]  PULMMED [address]  Scheduled Appointment: 07/11/2023

## 2023-05-08 NOTE — PROCEDURE NOTE - ADDITIONAL PROCEDURE DETAILS
discussed the procedure fully with patient and son who was initially at bedside.  Explained the full procedure and also explained fully each step as proceeded during placement as patient extremely nervous.  Patient tolerated procedure well without any difficulty, catheter irrigated to clear lido-jel from tip catheter and draining well.    Patient to follow his urologist as outpatient for continued clement management-

## 2023-05-08 NOTE — DISCHARGE NOTE NURSING/CASE MANAGEMENT/SOCIAL WORK - PATIENT PORTAL LINK FT
You can access the FollowMyHealth Patient Portal offered by [facility] by registering at the following website: [email or web address]. By joining [employer]’s FollowMyHealth portal, you will also be able to view your health information using other applications (apps) compatible with our system.

## 2023-05-08 NOTE — PROCEDURE NOTE - NSICDXPROCEDURE_GEN_ALL_CORE_FT
PROCEDURES:  Reinsertion of Clement catheter 08-May-2023 15:29:50 chronic clement / urinary retention Heri Mendoza

## 2023-05-08 NOTE — PROGRESS NOTE ADULT - REASON FOR ADMISSION
Sepsis 2/2 CAUTI

## 2023-05-08 NOTE — PROGRESS NOTE ADULT - SUBJECTIVE AND OBJECTIVE BOX
INFECTIOUS DISEASES AND INTERNAL MEDICINE at [place]  =======================================================  Neal Martinez MD  Diplomates American Board of Internal Medicine and Infectious Diseases  Telephone [phone]  Fax            [phone]  =======================================================    MALENA [id number]      ID f/u- bacteremia  feels well   REPEAT     PAST MEDICAL & SURGICAL HISTORY:  HTN (hypertension)      COPD (chronic obstructive pulmonary disease)      Chronic kidney failure, stage 4 (severe)      H/O ulcerative colitis      GERD (gastroesophageal reflux disease)      Previous back surgery      History of cholecystectomy      H/O colectomy      S/P cataract surgery      S/P total right hip arthroplasty          ANTIBIOTICS  meropenem  IVPB 500 milliGRAM(s) IV Intermittent every 24 hours      Allergies    No Known Allergies    Intolerances        SOCIAL HISTORY:     FAMILY HX   FAMILY HISTORY:  FH: stroke (Father)    FH: heart disease (Mother)    FH: diabetes mellitus (Mother)      Vital Signs Last 24 Hrs  T(C): 37.3 (06 May 2023 08:45), Max: 37.3 (06 May 2023 08:45)  T(F): 99.1 (06 May 2023 08:45), Max: 99.1 (06 May 2023 08:45)  HR: 68 (06 May 2023 08:45) (56 - 100)  BP: 118/65 (06 May 2023 08:45) (118/65 - 141/64)  BP(mean): --  RR: 18 (06 May 2023 08:45) (18 - 20)  SpO2: 95% (06 May 2023 08:45) (93% - 95%)    Parameters below as of 06 May 2023 08:45  Patient On (Oxygen Delivery Method): room air        REVIEW OF SYSTEMS:    CONSTITUTIONAL:  As per HPI.    HEENT:  Eyes:  No diplopia or blurred vision. ENT:  No earache, sore throat or runny nose.    CARDIOVASCULAR:  No pressure, squeezing, strangling, tightness, heaviness or aching about the chest, neck, axilla or epigastrium.    RESPIRATORY:  No cough, shortness of breath, PND or orthopnea.    GASTROINTESTINAL:  No nausea, vomiting or diarrhea.    GENITOURINARY:  No dysuria, frequency or urgency.    MUSCULOSKELETAL:  As per HPI.    SKIN:  No change in skin, hair or nails.    NEUROLOGIC:  No paresthesias, fasciculations, seizures or weakness.                  PHYSICAL EXAMINATION:    GENERAL: nad    Vital Signs Last 24 Hrs  T(C): 37.3 (06 May 2023 08:45), Max: 37.3 (06 May 2023 08:45)  T(F): 99.1 (06 May 2023 08:45), Max: 99.1 (06 May 2023 08:45)  HR: 68 (06 May 2023 08:45) (56 - 100)  BP: 118/65 (06 May 2023 08:45) (118/65 - 141/64)  BP(mean): --  RR: 18 (06 May 2023 08:45) (18 - 20)  SpO2: 95% (06 May 2023 08:45) (93% - 95%)    Parameters below as of 06 May 2023 08:45  Patient On (Oxygen Delivery Method): room air      HEENT: Head is normocephalic and atraumatic.  ANICTERIC  NECK: Supple. No carotid bruits.  No lymphadenopathy or thyromegaly.    LUNGS:COARSE BREATH SOUNDS    HEART: Regular rate and rhythm without murmur.    ABDOMEN: Soft, nontender, and nondistended.  Positive bowel sounds.  No hepatosplenomegaly was noted. NO REBOUND NO GUARDING OSTOMY IN PLACE    EXTREMITIES: NO EDEMA NO ERYTHEMA    NEUROLOGIC: NON FOCAL      SKIN: No ulceration or induration present. NO RASH DRAPER IN PLACE        BLOOD CULTURES  Culture Results:   Growth in anaerobic bottle: Gram Negative Rods  Growth in aerobic bottle: Gram Negative Rods ( @ 17:13)  Culture Results:   Growth in anaerobic bottle: Gram Negative Rods  Growth in aerobic bottle: Gram Negative Rods  ***Blood Panel PCR results on this specimen are available  approximately 3 hours after the Gram stain result.***  Gram stain, PCR, and/or culture results may not always  correspond due to difference in methodologies.  ************************************************************  This PCR assay was performed by multiplex PCR. This  Assay tests for 66 bacterial and resistance gene targets.  Please refer to the [facility] test directory  at [email or web address] for details. ( @ 17:12)       URINE CX          LABS:                                             9.3    10.55 )-----------( 158      ( 06 May 2023 12:34 )             29.6       05-06    138  |  104  |  46.9<H>  ----------------------------<  116<H>  4.2   |  20.0<L>  |  6.14<H>    Ca    9.0      06 May 2023 05:21  Phos  3.9     05-06  Mg     2.3     05-06    TPro  5.7<L>  /  Alb  3.2<L>  /  TBili  0.5  /  DBili  x   /  AST  7   /  ALT  6   /  AlkPhos  52  05-06              Urinalysis Basic - ( 04 May 2023 21:50 )    Color: Yellow / Appearance: Clear / S.010 / pH: x  Gluc: x / Ketone: Negative  / Bili: Negative / Urobili: Negative mg/dL   Blood: x / Protein: 100 / Nitrite: Negative   Leuk Esterase: Moderate / RBC: 11-25 /HPF / WBC 26-50 /HPF   Sq Epi: x / Non Sq Epi: x / Bacteria: Moderate        PT/INR - ( 04 May 2023 17:12 )   PT: 12.6 sec;   INR: 1.09 ratio         PTT - ( 04 May 2023 17:12 )  PTT:30.0 sec          CAPILLARY BLOOD GLUCOSE                   PTT - ( 04 May 2023 17:12 )  PTT:30.0 sec  Urinalysis Basic - ( 04 May 2023 21:50 )    Color: Yellow / Appearance: Clear / S.010 / pH: x  Gluc: x / Ketone: Negative  / Bili: Negative / Urobili: Negative mg/dL   Blood: x / Protein: 100 / Nitrite: Negative   Leuk Esterase: Moderate / RBC: 11-25 /HPF / WBC 26-50 /HPF   Sq Epi: x / Non Sq Epi: x / Bacteria: Moderate        RADIOLOGY & ADDITIONAL STUDIES:      ASSESSMENT/PLAN    89 yo male with pmhx CKD4, Urinary retention with chronic Draper (since 3/2023 after hip sx), COPD, GERD, UC s/p total colectomy 20 years ago, HTN presenting to the ED with Fever of 102 at home associated with weakness and chills/rigors. Patient states his Draper was changed in the urologists office yesterday and is declining to have it exchanged here in the ED today. Son is bedside to assist with history as patient is very [place] without hearing aids in. States patient had similar symptoms of chills and feeling unwell about 1 week ago, went to [facility] via EMS, but was feeling better upon arrival and decided to leave without treatment. For the past 1.5 weeks he has been complaining of some burning at the tip of his penis as well. He denies SOB, cough, chest pain, N/V/D. Endorses feeling much better since arriving to the hospital after receiving antibiotics.     AS ABOVE WITH DRAPER  SINCE MARCH FOLLOWS WITH DR JESSICA IN [place]        Bacteremia  Fever  CKD 5      BLOOD CX POSITIVE KLEBSIeLLA OXYTOCA f/u    per chart draper exchanged 5/3/23    meropenem adjusted for HD  f/u repeat BCX  NEGATIVE    OVERALL IMPROVED FOR D/C  CAN CHANGE TO ORAL ABX VANTIN ONCE A DAY LADONNA RECOMMEND TREAT FOR  TOTAL 14 DAYS  WILL FOLLOWUP IN OFFICE  SPOKE TO SON

## 2023-05-08 NOTE — PROGRESS NOTE ADULT - SUBJECTIVE AND OBJECTIVE BOX
NEPHROLOGY INTERVAL HPI/OVERNIGHT EVENTS:    Examined  Awake alert feels ok  [place] w good UOP  denies HA CP no SOB at rest  Son visiting    MEDICATIONS  (STANDING):  aspirin enteric coated 81 milliGRAM(s) Oral daily  budesonide  80 MICROgram(s)/formoterol 4.5 MICROgram(s) Inhaler 2 Puff(s) Inhalation two times a day  cefpodoxime 200 milliGRAM(s) Oral every 24 hours  cholecalciferol 1000 Unit(s) Oral daily  heparin   Injectable 5000 Unit(s) SubCutaneous every 12 hours  iron sucrose IVPB 200 milliGRAM(s) IV Intermittent every 24 hours  nebivolol 2.5 milliGRAM(s) Oral daily  sodium bicarbonate 650 milliGRAM(s) Oral daily  tamsulosin 0.4 milliGRAM(s) Oral at bedtime  tiotropium 2.5 MICROgram(s) Inhaler 2 Puff(s) Inhalation daily    MEDICATIONS  (PRN):  acetaminophen     Tablet .. 650 milliGRAM(s) Oral every 6 hours PRN Temp greater or equal to 38C (100.4F), Mild Pain (1 - 3)  aluminum hydroxide/magnesium hydroxide/simethicone Suspension 30 milliLiter(s) Oral every 4 hours PRN Dyspepsia  melatonin 3 milliGRAM(s) Oral at bedtime PRN Insomnia  ondansetron Injectable 4 milliGRAM(s) IV Push every 8 hours PRN Nausea and/or Vomiting      Allergies    No Known Allergies    Intolerances        Vital Signs Last 24 Hrs  T(C): 36.3 (08 May 2023 16:21), Max: 37 (08 May 2023 11:11)  T(F): 97.4 (08 May 2023 16:21), Max: 98.6 (08 May 2023 11:11)  HR: 74 (08 May 2023 16:21) (64 - 74)  BP: 160/82 (08 May 2023 16:21) (119/68 - 160/82)  BP(mean): --  RR: 18 (08 May 2023 16:21) (18 - 19)  SpO2: 96% (08 May 2023 16:21) (93% - 97%)    Parameters below as of 08 May 2023 16:21  Patient On (Oxygen Delivery Method): room air      PHYSICAL EXAM:  GENERAL: appears chronically ill  HEAD:  NCAT  EYES: EOMI  NECK: Supple  NERVOUS SYSTEM:  Alert & Oriented X3  CHEST/LUNG: Clear to percussion bilaterally  HEART: Regular rate and rhythm  ABDOMEN: Soft, Nontender, Nondistended; +BS  EXTREMITIES:  1+ B/L LE  edema    LABS:                        9.5    11.16 )-----------( 204      ( 08 May 2023 04:46 )             29.9     05-08    138  |  103  |  45.7<H>  ----------------------------<  106<H>  4.4   |  21.0<L>  |  5.36<H>    Ca    9.4      08 May 2023 04:46                  RADIOLOGY & ADDITIONAL TESTS:

## 2023-05-08 NOTE — PROGRESS NOTE ADULT - ASSESSMENT
89 yo male with pmhx CKD4, Urinary retention with chronic Clement (since 3/2023 after hip sx), COPD, GERD, UC s/p total colectomy 20 years ago,   HTN presenting to the ED with Fever of 102 at home associated with weakness and chills/rigors.    PANCHO on CKD stage V  Has advanced renal dz   Follows w Dr Mtz has refused initiation of dialysis in past  MA likely 2/2 CKD  Will adjust IVF w bicarb--> cont  Serum Cr improved w coarse of IVF  No urgent need for  HD currently- but will likely need soon  Renal function better w clement to be changed by urology    Now w sepsis   Has + Blood Cx Klebsiella  ID eval appreciated  + Pyuria w lactic acidosis  Has h/o urinary retention  Chronic clement changed on 5/3  Started on Abx Rocephin--> changed to Merrem    Anemia likely 2/2 CKD has low iron stores will need IV iron when infectious process resolves    hypotension - agree w low dose midodrine    Will follow

## 2023-05-08 NOTE — PROGRESS NOTE ADULT - SUBJECTIVE AND OBJECTIVE BOX
CC: Follow up     INTERVAL HPI/OVERNIGHT EVENTS: Patient seen and examined, afebrile. No acute complaints overnight.       Vital Signs Last 24 Hrs  T(C): 37 (08 May 2023 11:11), Max: 37 (08 May 2023 11:11)  T(F): 98.6 (08 May 2023 11:11), Max: 98.6 (08 May 2023 11:11)  HR: 64 (08 May 2023 11:11) (64 - 69)  BP: 119/68 (08 May 2023 11:11) (119/68 - 138/64)  BP(mean): --  RR: 18 (08 May 2023 11:11) (18 - 19)  SpO2: 97% (08 May 2023 11:11) (93% - 97%)    Parameters below as of 08 May 2023 11:11  Patient On (Oxygen Delivery Method): room air        PHYSICAL EXAM:    GENERAL: NAD, AOX3  HEAD:  Atraumatic, Normocephalic  EYES: , conjunctiva and sclera clear  ENMT: Moist mucous membranes  NECK: Supple  CHEST/LUNG: Clear to auscultation bilaterally; No rales, rhonchi, wheezing, or rubs  HEART: Regular rate and rhythm; No murmurs, rubs, or gallops  ABDOMEN: Soft, Nontender, Nondistended; Bowel sounds present  + Hinds   EXTREMITIES:  2+ Peripheral Pulses, No clubbing, cyanosis, or edema        MEDICATIONS  (STANDING):  aspirin enteric coated 81 milliGRAM(s) Oral daily  budesonide  80 MICROgram(s)/formoterol 4.5 MICROgram(s) Inhaler 2 Puff(s) Inhalation two times a day  cefpodoxime 200 milliGRAM(s) Oral every 24 hours  cholecalciferol 1000 Unit(s) Oral daily  heparin   Injectable 5000 Unit(s) SubCutaneous every 12 hours  iron sucrose IVPB 200 milliGRAM(s) IV Intermittent every 24 hours  nebivolol 2.5 milliGRAM(s) Oral daily  sodium bicarbonate 650 milliGRAM(s) Oral daily  tamsulosin 0.4 milliGRAM(s) Oral at bedtime  tiotropium 2.5 MICROgram(s) Inhaler 2 Puff(s) Inhalation daily    MEDICATIONS  (PRN):  acetaminophen     Tablet .. 650 milliGRAM(s) Oral every 6 hours PRN Temp greater or equal to 38C (100.4F), Mild Pain (1 - 3)  aluminum hydroxide/magnesium hydroxide/simethicone Suspension 30 milliLiter(s) Oral every 4 hours PRN Dyspepsia  melatonin 3 milliGRAM(s) Oral at bedtime PRN Insomnia  ondansetron Injectable 4 milliGRAM(s) IV Push every 8 hours PRN Nausea and/or Vomiting      Allergies    No Known Allergies    Intolerances          LABS:                          9.5    11.16 )-----------( 204      ( 08 May 2023 04:46 )             29.9     05-08    138  |  103  |  45.7<H>  ----------------------------<  106<H>  4.4   |  21.0<L>  |  5.36<H>    Ca    9.4      08 May 2023 04:46            RADIOLOGY & ADDITIONAL TESTS:

## 2023-05-09 ENCOUNTER — NON-APPOINTMENT (OUTPATIENT)
Age: 88
End: 2023-05-09

## 2023-05-10 LAB
CULTURE RESULTS: SIGNIFICANT CHANGE UP
CULTURE RESULTS: SIGNIFICANT CHANGE UP
SPECIMEN SOURCE: SIGNIFICANT CHANGE UP
SPECIMEN SOURCE: SIGNIFICANT CHANGE UP

## 2023-05-16 ENCOUNTER — APPOINTMENT (OUTPATIENT)
Dept: ORTHOPEDIC SURGERY | Facility: CLINIC | Age: 88
End: 2023-05-16
Payer: MEDICARE

## 2023-05-16 VITALS — DIASTOLIC BLOOD PRESSURE: 71 MMHG | SYSTOLIC BLOOD PRESSURE: 144 MMHG | HEART RATE: 69 BPM

## 2023-05-16 PROCEDURE — 99024 POSTOP FOLLOW-UP VISIT: CPT

## 2023-05-16 PROCEDURE — 73502 X-RAY EXAM HIP UNI 2-3 VIEWS: CPT

## 2023-05-16 NOTE — HISTORY OF PRESENT ILLNESS
[0] : no pain reported [Xray (Date:___)] : [unfilled] Xray -  [Doing Well] : is doing well [Excellent Pain Control] : has excellent pain control [No Sign of Infection] : is showing no signs of infection [Chills] : no chills [Fever] : no fever [de-identified] : right hip arthroplasty DOS 3/24/23. \par \par  [de-identified] : Patient is 7 weeks from right hip replacement he denies any pain which she is is extremely satisfied about the surgical outcome. he is ambulating with single-point cane he is  driving  and denies any limitation with his activities of daily living. \par He is still following up with the urologist for urinary retention he has urinary catheter.  He did have an incidence of urinary tract infection and treated at the hospital for 4 days of antibiotic .  He is feeling well today  [de-identified] :  Right hip exam shows healing incision with no sign of infection, AROM , standing and walking without pain. The surgical incision site(s) swollen, but clean, dry and intact, healed, not erythematous and not dehisced. Additional findings included an unremarkable neurological exam, peripheral vascular exam normal and maneuvers demonstrated a negative Kay's sign.  No Trendelenburg gait or antalgic gait\par  [de-identified] :  3V xray of the right hip done in the office today and reviewed and demonstrates s/p implants in good positioning with no evidence of wear, loosening, or subsidence. \par  [de-identified] : Patient will continue with low impact exercise at home. I discussed use of antibiotic before dental work for 2 years.  Continue posterior hip precaution follow-up in 1 month

## 2023-05-17 ENCOUNTER — TRANSCRIPTION ENCOUNTER (OUTPATIENT)
Age: 88
End: 2023-05-17

## 2023-05-17 ENCOUNTER — APPOINTMENT (OUTPATIENT)
Dept: INTERNAL MEDICINE | Facility: CLINIC | Age: 88
End: 2023-05-17
Payer: MEDICARE

## 2023-05-17 VITALS
HEART RATE: 63 BPM | HEIGHT: 64 IN | OXYGEN SATURATION: 99 % | SYSTOLIC BLOOD PRESSURE: 126 MMHG | DIASTOLIC BLOOD PRESSURE: 80 MMHG | BODY MASS INDEX: 29.53 KG/M2 | WEIGHT: 173 LBS

## 2023-05-17 DIAGNOSIS — Z09 ENCOUNTER FOR FOLLOW-UP EXAMINATION AFTER COMPLETED TREATMENT FOR CONDITIONS OTHER THAN MALIGNANT NEOPLASM: ICD-10-CM

## 2023-05-17 DIAGNOSIS — B96.1 BACTEREMIA: ICD-10-CM

## 2023-05-17 DIAGNOSIS — R78.81 BACTEREMIA: ICD-10-CM

## 2023-05-17 PROCEDURE — 99495 TRANSJ CARE MGMT MOD F2F 14D: CPT | Mod: 25

## 2023-05-17 PROCEDURE — 99215 OFFICE O/P EST HI 40 MIN: CPT | Mod: 25

## 2023-05-17 PROCEDURE — 36415 COLL VENOUS BLD VENIPUNCTURE: CPT

## 2023-05-17 NOTE — HISTORY OF PRESENT ILLNESS
[Spouse] : spouse [de-identified] : 89 PT HERE FOR FOLLOW UP  FEELS OK   S/P HIPS SURGERY DR DUMONT AT [facility]  3/23 THEN WENT TO REHAB BUT DEVELOPED  ISSUE AND WENT HOME AFTER 5 DAYS\par PT HAS BEEN SEEING DR ARACELY HENRY\par PT DEVELOPED ALTERED MENTAL STATUS WENT OT [facility] BLOOD CX GM NEG RODS KLEBSIELLA\par IV ABX THEN ORAL  DRAPER CHANGED AT [facility] HERE FOR FOLLOW UP\par   [FreeTextEntry1] : HOSPTIAL FOLLOWUP  BACTEREMIA

## 2023-05-17 NOTE — PLAN
[FreeTextEntry1] : 89 PT HERE FOR FOLLOW UP  FEELS OK   S/P HIPS SURGERY DR DUMONT AT [place]  3/23 THEN WENT TO REHAB BUT DEVELOPED  ISSUE AND WENT HOME AFTER 5 DAYS\par PT HAS BEEN SEEING DR ARACELY HENRY\par PT DEVELOPED ALTERED MENTAL STATUS WENT OT [place] BLOOD CX GM NEG RODS KLEBSIELLA\par IV ABX THEN ORAL  DRAPER CHANGED AT [place] HERE FOR FOLLOW UP\par IN TERMS OF INFECTION CAN COMPLETE ORAL VANTIN\par PT WANTS TO GET ANOTHER UROLOGY OPINION WILL SEND TO DR OLIVERA\par WIFE SAYS HE IS ALWASY COLD\par PT WITH ANEMIA RECEIVED IRON INFUSION AT HOSP\par WILL CHECK IRON STUDIES HERE\par OVERALL FEELING BETTER\par SPOKE TO WIFE IN ROOM\par

## 2023-05-17 NOTE — PHYSICAL EXAM
[No Acute Distress] : no acute distress [Well Nourished] : well nourished [Well Developed] : well developed [Well-Appearing] : well-appearing [Normal Sclera/Conjunctiva] : normal sclera/conjunctiva [PERRL] : pupils equal round and reactive to light [EOMI] : extraocular movements intact [Normal Outer Ear/Nose] : the outer ears and nose were normal in appearance [Normal Oropharynx] : the oropharynx was normal [No JVD] : no jugular venous distention [No Lymphadenopathy] : no lymphadenopathy [Supple] : supple [Thyroid Normal, No Nodules] : the thyroid was normal and there were no nodules present [No Respiratory Distress] : no respiratory distress  [No Accessory Muscle Use] : no accessory muscle use [Clear to Auscultation] : lungs were clear to auscultation bilaterally [Normal Rate] : normal rate  [Regular Rhythm] : with a regular rhythm [Normal S1, S2] : normal S1 and S2 [No Murmur] : no murmur heard [No Carotid Bruits] : no carotid bruits [No Abdominal Bruit] : a ~M bruit was not heard ~T in the abdomen [No Varicosities] : no varicosities [Pedal Pulses Present] : the pedal pulses are present [No Edema] : there was no peripheral edema [No Palpable Aorta] : no palpable aorta [No Extremity Clubbing/Cyanosis] : no extremity clubbing/cyanosis [Soft] : abdomen soft [Non Tender] : non-tender [Non-distended] : non-distended [No Masses] : no abdominal mass palpated [No HSM] : no HSM [Normal Bowel Sounds] : normal bowel sounds [Normal Posterior Cervical Nodes] : no posterior cervical lymphadenopathy [Normal Anterior Cervical Nodes] : no anterior cervical lymphadenopathy [No CVA Tenderness] : no CVA  tenderness [No Spinal Tenderness] : no spinal tenderness [No Joint Swelling] : no joint swelling [Grossly Normal Strength/Tone] : grossly normal strength/tone [No Rash] : no rash [Coordination Grossly Intact] : coordination grossly intact [No Focal Deficits] : no focal deficits [Normal Gait] : normal gait [Deep Tendon Reflexes (DTR)] : deep tendon reflexes were 2+ and symmetric [Normal Affect] : the affect was normal [Normal Insight/Judgement] : insight and judgment were intact [de-identified] : OSTOMY [de-identified] : LORENZA IN [place]

## 2023-05-19 ENCOUNTER — NON-APPOINTMENT (OUTPATIENT)
Age: 88
End: 2023-05-19

## 2023-05-19 LAB
ALBUMIN SERPL ELPH-MCNC: 4.2 G/DL
ALP BLD-CCNC: 62 U/L
ALT SERPL-CCNC: 14 U/L
ANION GAP SERPL CALC-SCNC: 17 MMOL/L
AST SERPL-CCNC: 17 U/L
BILIRUB SERPL-MCNC: 0.3 MG/DL
BUN SERPL-MCNC: 96 MG/DL
CALCIUM SERPL-MCNC: 10.5 MG/DL
CHLORIDE SERPL-SCNC: 106 MMOL/L
CO2 SERPL-SCNC: 19 MMOL/L
CREAT SERPL-MCNC: 6.38 MG/DL
EGFR: 8 ML/MIN/1.73M2
FERRITIN SERPL-MCNC: 787 NG/ML
GLUCOSE SERPL-MCNC: 107 MG/DL
HCT VFR BLD CALC: 28 %
HGB BLD-MCNC: 9 G/DL
IRON SATN MFR SERPL: 25 %
IRON SERPL-MCNC: 77 UG/DL
MCHC RBC-ENTMCNC: 30.9 PG
MCHC RBC-ENTMCNC: 32.1 GM/DL
MCV RBC AUTO: 96.2 FL
PLATELET # BLD AUTO: 251 K/UL
POTASSIUM SERPL-SCNC: 4.1 MMOL/L
PROT SERPL-MCNC: 6.6 G/DL
RBC # BLD: 2.91 M/UL
RBC # FLD: 13.8 %
SODIUM SERPL-SCNC: 142 MMOL/L
TIBC SERPL-MCNC: 307 UG/DL
UIBC SERPL-MCNC: 230 UG/DL
WBC # FLD AUTO: 9.23 K/UL

## 2023-06-01 ENCOUNTER — TRANSCRIPTION ENCOUNTER (OUTPATIENT)
Age: 88
End: 2023-06-01

## 2023-06-02 ENCOUNTER — APPOINTMENT (OUTPATIENT)
Dept: UROLOGY | Facility: CLINIC | Age: 88
End: 2023-06-02

## 2023-06-05 ENCOUNTER — APPOINTMENT (OUTPATIENT)
Dept: UROLOGY | Facility: CLINIC | Age: 88
End: 2023-06-05
Payer: MEDICARE

## 2023-06-05 VITALS — DIASTOLIC BLOOD PRESSURE: 67 MMHG | HEART RATE: 64 BPM | SYSTOLIC BLOOD PRESSURE: 137 MMHG

## 2023-06-05 PROCEDURE — 99215 OFFICE O/P EST HI 40 MIN: CPT

## 2023-06-05 NOTE — HISTORY OF PRESENT ILLNESS
[FreeTextEntry1] : 89 yo M here for initial consultation\par here with wife and daughter\par looks well, not frail, very active\par has multiple comorbidities, has advanced CKD, also regular cardiology and nephrology follow up\par \par has been with a clement catheter since 03/2023 following hip surgery and retention\par clement has been changed regularly\par also saw a urologist (don’t have full details)\par \par reviewed 5/17/2023 urodynamics:\par - no Detrusor instability\par - no SYLVIA\par - detrusor contraction withn o flow, suggestive of obstruction\par \par reviewed previous CT in the hospital:\par - prostate measured almost 60 gr\par - two stones in the bladder\par \par discussed the findings with the patient\par he is interested in surgery for the removal of the clement catheter, most of his complains are about movement of the clement, and not its presence inside the bladder.\par discussed the option of trying to remove the clement bag, and him emptying the bladder periodically.\par also discussed options for cystolitholapaxy for the stones, discussed the procedure, possible risk, possibility he would need to be admitted after.\par discussed the option for laser enucleation of the prostate, discussed the risks of the procedure and it would be a 2-3 hour procedure,\par \par plan:\par - removing bag, plug clement\par - clement change in 2 weeks\par - will discuss then if he is feeling better and if he needs any prostate surgery\par - will discuss cystolitholapaxy again, with or without LEP\par - will need cardiology, nephrology, and medical clearance for any procedure

## 2023-06-08 ENCOUNTER — TRANSCRIPTION ENCOUNTER (OUTPATIENT)
Age: 88
End: 2023-06-08

## 2023-06-08 ENCOUNTER — APPOINTMENT (OUTPATIENT)
Dept: INTERNAL MEDICINE | Facility: CLINIC | Age: 88
End: 2023-06-08

## 2023-06-21 ENCOUNTER — APPOINTMENT (OUTPATIENT)
Dept: ORTHOPEDIC SURGERY | Facility: CLINIC | Age: 88
End: 2023-06-21

## 2023-06-21 ENCOUNTER — APPOINTMENT (OUTPATIENT)
Dept: ORTHOPEDIC SURGERY | Facility: CLINIC | Age: 88
End: 2023-06-21
Payer: MEDICARE

## 2023-06-21 ENCOUNTER — APPOINTMENT (OUTPATIENT)
Dept: UROLOGY | Facility: CLINIC | Age: 88
End: 2023-06-21
Payer: MEDICARE

## 2023-06-21 VITALS
DIASTOLIC BLOOD PRESSURE: 72 MMHG | SYSTOLIC BLOOD PRESSURE: 126 MMHG | OXYGEN SATURATION: 96 % | HEART RATE: 94 BPM | RESPIRATION RATE: 16 BRPM

## 2023-06-21 VITALS — DIASTOLIC BLOOD PRESSURE: 74 MMHG | SYSTOLIC BLOOD PRESSURE: 134 MMHG | HEART RATE: 60 BPM

## 2023-06-21 PROCEDURE — 73502 X-RAY EXAM HIP UNI 2-3 VIEWS: CPT

## 2023-06-21 PROCEDURE — 51702 INSERT TEMP BLADDER CATH: CPT

## 2023-06-21 PROCEDURE — 99024 POSTOP FOLLOW-UP VISIT: CPT

## 2023-06-21 NOTE — END OF VISIT
[FreeTextEntry3] : I, Julian Umanzor, acted solely as a scribe for Dr. Alessandro Sarmiento on 06/21/2023

## 2023-06-21 NOTE — HISTORY OF PRESENT ILLNESS
[Clean/Dry/Intact] : clean, dry and intact [Healed] : healed [Swelling] : swollen [Neuro Intact] : an unremarkable neurological exam [Vascular Intact] : ~T peripheral vascular exam normal [Negative Kay's] : maneuvers demonstrated a negative Kay's sign [Xray (Date:___)] : [unfilled] Xray -  [Doing Well] : is doing well [No Sign of Infection] : is showing no signs of infection [Adequate Pain Control] : has adequate pain control [1] : the patient reports pain that is 1/10 in severity [Chills] : no chills [Constipation] : no constipation [Diarrhea] : no diarrhea [Dysuria] : no dysuria [Fever] : no fever [Nausea] : no nausea [Vomiting] : no vomiting [Erythema] : not erythematous [Discharge] : absent of discharge [Dehiscence] : not dehisced [de-identified] : right hip arthroplasty DOS 3/24/23.  [de-identified] : 89 y/o M pt presents 12 weeks post op from right ROMINA. Patient states he never had after the hip replacement he states he has chronic  lower leg swelling has also improved after hip replacement  he is ambulating without walking assistive device he is driving.   [de-identified] : Right knee exam shows healed incision with no sign of infection. ROM is painless [de-identified] : 3V xray of the right knee done in office today and reviewed by Dr. Alessandro Sarmiento demonstrates s/p right knee implants in good positioning with no evidence of wear, loosening, or subsidence.  [de-identified] : He should continue to do low impact exercises. Pt understands the importance of prophylaxis for invasive dental procedures. F/u with us a year from surgery.

## 2023-06-27 ENCOUNTER — RX RENEWAL (OUTPATIENT)
Age: 88
End: 2023-06-27

## 2023-07-11 ENCOUNTER — APPOINTMENT (OUTPATIENT)
Dept: PULMONOLOGY | Facility: CLINIC | Age: 88
End: 2023-07-11

## 2023-07-19 ENCOUNTER — RX CHANGE (OUTPATIENT)
Age: 88
End: 2023-07-19

## 2023-07-19 ENCOUNTER — APPOINTMENT (OUTPATIENT)
Dept: INTERNAL MEDICINE | Facility: CLINIC | Age: 88
End: 2023-07-19
Payer: MEDICARE

## 2023-07-19 VITALS
HEIGHT: 64 IN | OXYGEN SATURATION: 99 % | HEART RATE: 65 BPM | DIASTOLIC BLOOD PRESSURE: 70 MMHG | WEIGHT: 151 LBS | SYSTOLIC BLOOD PRESSURE: 120 MMHG | BODY MASS INDEX: 25.78 KG/M2

## 2023-07-19 PROCEDURE — 99214 OFFICE O/P EST MOD 30 MIN: CPT

## 2023-07-19 NOTE — HISTORY OF PRESENT ILLNESS
[Spouse] : spouse [FreeTextEntry1] :  FOLLOWUP   HTN CRI  [de-identified] : 89 PT HERE FOR FOLLOW UP  FEELS OK   S/P HIPS SURGERY DR DUMONT AT [place]  3/23 THEN WENT TO REHAB BUT DEVELOPED  ISSUE AND WENT HOME AFTER 5 DAYS\par PT HAS BEEN SEEING DR ARACELY HENRY AND NOWIS SEEING DR STORM AT [facility] \par  PT WITH RECENT LAS WITH WORSENING  BUN CR ADN HAS APPT WITH [facility] TO DISCUSS DIALYSIS

## 2023-07-19 NOTE — PLAN
[FreeTextEntry1] : 89 PT HERE FOR FOLLOW UP  FEELS OK   S/P HIPS SURGERY DR DUMONT AT [place]  3/23 THEN WENT TO REHAB BUT DEVELOPED  ISSUE AND WENT HOME AFTER 5 DAYS\par PT HAS BEEN SEEING DR ARACELY HENRY AND NOWIS SEEING DR STORM AT [facility] \par  PT WITH RECENT LAS WITH WORSENING  BUN CR ADN HAS APPT WITH [facility] TO DISCUSS DIALYSIS\par PT HAS REFUSED DIALYSIS FOR YEARS BUT\par MAY BE AGREEABLE\par OVERALL NON TOXIC  ACTIVE MALE \par WILL FOLLOWUP AFTE HE SEES [facility]

## 2023-07-19 NOTE — PHYSICAL EXAM
[No Acute Distress] : no acute distress [Well Nourished] : well nourished [Well Developed] : well developed [Well-Appearing] : well-appearing [Normal Sclera/Conjunctiva] : normal sclera/conjunctiva [PERRL] : pupils equal round and reactive to light [EOMI] : extraocular movements intact [Normal Outer Ear/Nose] : the outer ears and nose were normal in appearance [Normal Oropharynx] : the oropharynx was normal [No JVD] : no jugular venous distention [No Lymphadenopathy] : no lymphadenopathy [Supple] : supple [Thyroid Normal, No Nodules] : the thyroid was normal and there were no nodules present [No Respiratory Distress] : no respiratory distress  [No Accessory Muscle Use] : no accessory muscle use [Clear to Auscultation] : lungs were clear to auscultation bilaterally [Normal Rate] : normal rate  [Regular Rhythm] : with a regular rhythm [Normal S1, S2] : normal S1 and S2 [No Murmur] : no murmur heard [No Carotid Bruits] : no carotid bruits [No Abdominal Bruit] : a ~M bruit was not heard ~T in the abdomen [No Varicosities] : no varicosities [Pedal Pulses Present] : the pedal pulses are present [No Edema] : there was no peripheral edema [No Palpable Aorta] : no palpable aorta [No Extremity Clubbing/Cyanosis] : no extremity clubbing/cyanosis [Soft] : abdomen soft [Non Tender] : non-tender [Non-distended] : non-distended [No Masses] : no abdominal mass palpated [No HSM] : no HSM [Normal Bowel Sounds] : normal bowel sounds [Normal Posterior Cervical Nodes] : no posterior cervical lymphadenopathy [Normal Anterior Cervical Nodes] : no anterior cervical lymphadenopathy [No CVA Tenderness] : no CVA  tenderness [No Spinal Tenderness] : no spinal tenderness [No Joint Swelling] : no joint swelling [Grossly Normal Strength/Tone] : grossly normal strength/tone [No Rash] : no rash [Coordination Grossly Intact] : coordination grossly intact [No Focal Deficits] : no focal deficits [Normal Gait] : normal gait [Deep Tendon Reflexes (DTR)] : deep tendon reflexes were 2+ and symmetric [Normal Affect] : the affect was normal [Normal Insight/Judgement] : insight and judgment were intact [de-identified] : OSTOMY [de-identified] : LORENZA IN [place]

## 2023-07-31 ENCOUNTER — APPOINTMENT (OUTPATIENT)
Dept: UROLOGY | Facility: CLINIC | Age: 88
End: 2023-07-31
Payer: MEDICARE

## 2023-07-31 PROCEDURE — 51702 INSERT TEMP BLADDER CATH: CPT

## 2023-08-14 NOTE — PATIENT PROFILE ADULT - INTERNATIONAL TRAVEL
Has The Patient Completed Informed Consent?: is scheduled to complete informed consent documentation during this visit No

## 2023-09-11 ENCOUNTER — APPOINTMENT (OUTPATIENT)
Dept: UROLOGY | Facility: CLINIC | Age: 88
End: 2023-09-11
Payer: MEDICARE

## 2023-09-11 VITALS — HEART RATE: 71 BPM | SYSTOLIC BLOOD PRESSURE: 158 MMHG | DIASTOLIC BLOOD PRESSURE: 69 MMHG

## 2023-09-11 DIAGNOSIS — N18.6 END STAGE RENAL DISEASE: ICD-10-CM

## 2023-09-11 PROCEDURE — 51702 INSERT TEMP BLADDER CATH: CPT

## 2023-09-27 ENCOUNTER — APPOINTMENT (OUTPATIENT)
Dept: INTERNAL MEDICINE | Facility: CLINIC | Age: 88
End: 2023-09-27
Payer: MEDICARE

## 2023-09-27 ENCOUNTER — APPOINTMENT (OUTPATIENT)
Dept: DERMATOLOGY | Facility: CLINIC | Age: 88
End: 2023-09-27
Payer: MEDICARE

## 2023-09-27 VITALS
BODY MASS INDEX: 27.49 KG/M2 | HEIGHT: 64 IN | SYSTOLIC BLOOD PRESSURE: 124 MMHG | DIASTOLIC BLOOD PRESSURE: 52 MMHG | WEIGHT: 161 LBS

## 2023-09-27 DIAGNOSIS — J44.9 CHRONIC OBSTRUCTIVE PULMONARY DISEASE, UNSPECIFIED: ICD-10-CM

## 2023-09-27 DIAGNOSIS — Z01.818 ENCOUNTER FOR OTHER PREPROCEDURAL EXAMINATION: ICD-10-CM

## 2023-09-27 PROCEDURE — 99213 OFFICE O/P EST LOW 20 MIN: CPT

## 2023-09-27 PROCEDURE — 99214 OFFICE O/P EST MOD 30 MIN: CPT

## 2023-10-09 ENCOUNTER — APPOINTMENT (OUTPATIENT)
Dept: UROLOGY | Facility: CLINIC | Age: 88
End: 2023-10-09
Payer: MEDICARE

## 2023-10-09 VITALS — HEART RATE: 74 BPM | DIASTOLIC BLOOD PRESSURE: 63 MMHG | SYSTOLIC BLOOD PRESSURE: 154 MMHG

## 2023-10-09 PROCEDURE — A4216: CPT | Mod: NC

## 2023-10-09 PROCEDURE — 51700 IRRIGATION OF BLADDER: CPT

## 2023-10-24 NOTE — PATIENT PROFILE ADULT - FALL HARM RISK - HARM RISK INTERVENTIONS
will use proper sleeping techniques for pain prevention and ideal posture to prevent muscle shortening contributing to pain issues. [] Met [x] Not met [] Partially met  Date: 9/20 can only sleep on L side     Long Term Goals: To be accomplished in 12-18  treatments  Pt will have improved AMPAC score by 5%. [] Met [] Not met [] Partially met Date:      Pt will be able to stand greater than 10  minutes without pain > 4/10 so they can do ADL's like wash dishes. [] Met [x] Not met [] Partially met Date: 9/20  10/24 reports able to stand 6-7 minutes before pain increases, pain 6/10 at best     Pt will be able to ambulate community distances and walk > 20 min with less pain or difficulty to get groceries, medicine, etc.      [] Met [x] Not met [] Partially met Date: 9/20  10/24 reports able to walk 4-5 minutes before pain increases, pain 6/10 at best     Pt will be able to bend down to put on socks/shoes with less difficulty due to improved ROM. [] Met [x] Not met [] Partially met Date: 9/20  10/24 continues with reported difficulty     Pt LE strength will increase by 1/2 to 1 grade to improve lumbar stability. [] Met [x] Not met [] Partially met Date: 9/20     Pt will report centralization of symptoms/less radicular pain by 50%. [] Met [x] Not met [] Partially met Date:  9/20  10/24 same frequency of radicular symptoms              Pt TUG will decrease to <=15 sec to improve his balance and reduce his fall risk.        [] Met [] Not met [] Partially met Date:                  Frequency / Duration: Patient to be seen 1-2  times per week for 67-45  treatments  Certification Period:   10/17/23-12/16/23 (60 DAYS)        PLAN  Yes  Continue plan of care  Re-Cert Due: 04/93/7974  [x]  Upgrade activities as tolerated  []  Discharge due to :  [x]  Other: recheck Ampac, TUG      PATIENCE DE LA GARZA, PT       10/24/2023       10:38 AM
Assistance with ambulation/Assistance OOB with selected safe patient handling equipment/Communicate Risk of Fall with Harm to all staff/Discuss with provider need for PT consult/Monitor gait and stability/Provide patient with walking aids - walker, cane, crutches/Reinforce activity limits and safety measures with patient and family/Sit up slowly, dangle for a short time, stand at bedside before walking/Tailored Fall Risk Interventions/Use of alarms - bed, chair and/or voice tab/Visual Cue: Yellow wristband and red socks/Bed in lowest position, wheels locked, appropriate side rails in place/Call bell, personal items and telephone in reach/Instruct patient to call for assistance before getting out of bed or chair/Non-slip footwear when patient is out of bed/[place] to call system/Physically safe environment - no spills, clutter or unnecessary equipment/Purposeful Proactive Rounding/Room/bathroom lighting operational, light cord in reach

## 2023-10-26 ENCOUNTER — APPOINTMENT (OUTPATIENT)
Dept: INTERNAL MEDICINE | Facility: CLINIC | Age: 88
End: 2023-10-26
Payer: MEDICARE

## 2023-10-26 VITALS
HEART RATE: 62 BPM | HEIGHT: 64 IN | WEIGHT: 162 LBS | DIASTOLIC BLOOD PRESSURE: 58 MMHG | BODY MASS INDEX: 27.66 KG/M2 | SYSTOLIC BLOOD PRESSURE: 129 MMHG

## 2023-10-26 DIAGNOSIS — D64.9 ANEMIA, UNSPECIFIED: ICD-10-CM

## 2023-10-26 PROCEDURE — 99214 OFFICE O/P EST MOD 30 MIN: CPT

## 2023-11-06 ENCOUNTER — APPOINTMENT (OUTPATIENT)
Dept: UROLOGY | Facility: CLINIC | Age: 88
End: 2023-11-06
Payer: MEDICARE

## 2023-11-06 VITALS — SYSTOLIC BLOOD PRESSURE: 147 MMHG | HEART RATE: 75 BPM | DIASTOLIC BLOOD PRESSURE: 74 MMHG

## 2023-11-06 PROCEDURE — 51702 INSERT TEMP BLADDER CATH: CPT

## 2023-12-11 ENCOUNTER — APPOINTMENT (OUTPATIENT)
Dept: UROLOGY | Facility: CLINIC | Age: 88
End: 2023-12-11
Payer: MEDICARE

## 2023-12-11 ENCOUNTER — APPOINTMENT (OUTPATIENT)
Dept: UROLOGY | Facility: CLINIC | Age: 88
End: 2023-12-11

## 2023-12-11 PROCEDURE — 51702 INSERT TEMP BLADDER CATH: CPT

## 2023-12-11 PROCEDURE — 99213 OFFICE O/P EST LOW 20 MIN: CPT | Mod: 25

## 2023-12-14 ENCOUNTER — APPOINTMENT (OUTPATIENT)
Dept: DERMATOLOGY | Facility: CLINIC | Age: 88
End: 2023-12-14
Payer: MEDICARE

## 2023-12-14 PROCEDURE — 99212 OFFICE O/P EST SF 10 MIN: CPT

## 2024-01-10 ENCOUNTER — APPOINTMENT (OUTPATIENT)
Dept: INTERNAL MEDICINE | Facility: CLINIC | Age: 89
End: 2024-01-10
Payer: MEDICARE

## 2024-01-10 VITALS
SYSTOLIC BLOOD PRESSURE: 135 MMHG | DIASTOLIC BLOOD PRESSURE: 61 MMHG | BODY MASS INDEX: 29.02 KG/M2 | OXYGEN SATURATION: 98 % | HEART RATE: 70 BPM | WEIGHT: 170 LBS | HEIGHT: 64 IN

## 2024-01-10 DIAGNOSIS — I10 ESSENTIAL (PRIMARY) HYPERTENSION: ICD-10-CM

## 2024-01-10 DIAGNOSIS — Z13.29 ENCOUNTER FOR SCREENING FOR OTHER SUSPECTED ENDOCRINE DISORDER: ICD-10-CM

## 2024-01-10 DIAGNOSIS — E78.5 HYPERLIPIDEMIA, UNSPECIFIED: ICD-10-CM

## 2024-01-10 DIAGNOSIS — R73.03 PREDIABETES.: ICD-10-CM

## 2024-01-10 PROCEDURE — 99214 OFFICE O/P EST MOD 30 MIN: CPT | Mod: 25

## 2024-01-10 PROCEDURE — 36415 COLL VENOUS BLD VENIPUNCTURE: CPT

## 2024-01-10 NOTE — HISTORY OF PRESENT ILLNESS
[Family Member] : family member [FreeTextEntry1] : FOLLOW UP ON HTN CRI [de-identified] : 89 PT HERE FOR FOLLOW UP  FEELS OK   S/P HIPS SURGERY DR DUMONT AT [place]  03/23 THEN WENT TO REHAB BUT DEVELOPED  ISSUE AND WENT HOME AFTER 5 DAYS PT HAS BEEN SEEING DR ARACELY HENRY AND NOWIS SEEING DR OLIVERA  AT [facility] HAS  DRAPER CATHETER   PT S/P FISTULA LEFT WRIST WHICH IS NOW  IN PREPARATION FOR EVENUTAL DIALYSIS' HERE WITH SON

## 2024-01-10 NOTE — PHYSICAL EXAM
[No Acute Distress] : no acute distress [Well Nourished] : well nourished [Well Developed] : well developed [Well-Appearing] : well-appearing [Normal Sclera/Conjunctiva] : normal sclera/conjunctiva [PERRL] : pupils equal round and reactive to light [EOMI] : extraocular movements intact [Normal Outer Ear/Nose] : the outer ears and nose were normal in appearance [Normal Oropharynx] : the oropharynx was normal [No JVD] : no jugular venous distention [No Lymphadenopathy] : no lymphadenopathy [Supple] : supple [Thyroid Normal, No Nodules] : the thyroid was normal and there were no nodules present [No Respiratory Distress] : no respiratory distress  [No Accessory Muscle Use] : no accessory muscle use [Clear to Auscultation] : lungs were clear to auscultation bilaterally [Normal Rate] : normal rate  [Regular Rhythm] : with a regular rhythm [Normal S1, S2] : normal S1 and S2 [No Murmur] : no murmur heard [No Carotid Bruits] : no carotid bruits [No Abdominal Bruit] : a ~M bruit was not heard ~T in the abdomen [No Varicosities] : no varicosities [Pedal Pulses Present] : the pedal pulses are present [No Edema] : there was no peripheral edema [No Palpable Aorta] : no palpable aorta [No Extremity Clubbing/Cyanosis] : no extremity clubbing/cyanosis [Soft] : abdomen soft [Non Tender] : non-tender [Non-distended] : non-distended [No Masses] : no abdominal mass palpated [No HSM] : no HSM [Normal Bowel Sounds] : normal bowel sounds [Normal Posterior Cervical Nodes] : no posterior cervical lymphadenopathy [Normal Anterior Cervical Nodes] : no anterior cervical lymphadenopathy [No CVA Tenderness] : no CVA  tenderness [No Spinal Tenderness] : no spinal tenderness [No Joint Swelling] : no joint swelling [Grossly Normal Strength/Tone] : grossly normal strength/tone [No Rash] : no rash [Coordination Grossly Intact] : coordination grossly intact [No Focal Deficits] : no focal deficits [Normal Gait] : normal gait [Deep Tendon Reflexes (DTR)] : deep tendon reflexes were 2+ and symmetric [Normal Affect] : the affect was normal [Normal Insight/Judgement] : insight and judgment were intact [de-identified] : OSTOMY [de-identified] : LORENZA IN [place]

## 2024-01-10 NOTE — PLAN
[FreeTextEntry1] : 89 PT HERE FOR FOLLOW UP  FEELS OK   S/P HIPS SURGERY DR DUMONT AT [place]  03/23 THEN WENT TO REHAB BUT DEVELOPED  ISSUE AND WENT HOME AFTER 5 DAYS PT HAS BEEN SEEING DR ARACELY HENRY AND NOWIS SEEING DR OLIVERA  AT [facility] HAS  DRAPER CATHETER   PT S/P FISTULA LEFT WRIST WHICH IS NOW  IN PREPARATION FOR EVENUTAL DIALYSIS' HERE WITH SON PHYSICAL EXAM LEFT WRIST FISTULA PT NOJN TOXIC WILL CHECK LABS WITH DR MCCLENDON BUT DOES NOT DO ALL THE LABS WILL DO SOME HERE TODAY OVERALL STABLE

## 2024-01-13 LAB
ALBUMIN SERPL ELPH-MCNC: 4.3 G/DL
ALP BLD-CCNC: 64 U/L
ALT SERPL-CCNC: 28 U/L
ANION GAP SERPL CALC-SCNC: 13 MMOL/L
AST SERPL-CCNC: 15 U/L
BASOPHILS # BLD AUTO: 0.05 K/UL
BASOPHILS NFR BLD AUTO: 0.7 %
BILIRUB SERPL-MCNC: 0.5 MG/DL
BUN SERPL-MCNC: 50 MG/DL
CALCIUM SERPL-MCNC: 10.1 MG/DL
CHLORIDE SERPL-SCNC: 109 MMOL/L
CHOLEST SERPL-MCNC: 136 MG/DL
CO2 SERPL-SCNC: 17 MMOL/L
CREAT SERPL-MCNC: 7.48 MG/DL
EGFR: 6 ML/MIN/1.73M2
EOSINOPHIL # BLD AUTO: 0.44 K/UL
EOSINOPHIL NFR BLD AUTO: 6.4 %
ESTIMATED AVERAGE GLUCOSE: 108 MG/DL
GLUCOSE SERPL-MCNC: 114 MG/DL
HBA1C MFR BLD HPLC: 5.4 %
HCT VFR BLD CALC: 31.2 %
HDLC SERPL-MCNC: 44 MG/DL
HGB BLD-MCNC: 10.3 G/DL
IMM GRANULOCYTES NFR BLD AUTO: 1.6 %
LDLC SERPL CALC-MCNC: 67 MG/DL
LYMPHOCYTES # BLD AUTO: 1.04 K/UL
LYMPHOCYTES NFR BLD AUTO: 15.2 %
MAN DIFF?: NORMAL
MCHC RBC-ENTMCNC: 32.3 PG
MCHC RBC-ENTMCNC: 33 GM/DL
MCV RBC AUTO: 97.8 FL
MONOCYTES # BLD AUTO: 0.82 K/UL
MONOCYTES NFR BLD AUTO: 12 %
NEUTROPHILS # BLD AUTO: 4.39 K/UL
NEUTROPHILS NFR BLD AUTO: 64.1 %
NONHDLC SERPL-MCNC: 92 MG/DL
PLATELET # BLD AUTO: 133 K/UL
POTASSIUM SERPL-SCNC: 4.7 MMOL/L
PROT SERPL-MCNC: 6.6 G/DL
PSA SERPL-MCNC: 18 NG/ML
RBC # BLD: 3.19 M/UL
RBC # FLD: 14.9 %
SODIUM SERPL-SCNC: 140 MMOL/L
T4 SERPL-MCNC: 5.5 UG/DL
TRIGL SERPL-MCNC: 142 MG/DL
TSH SERPL-ACNC: 2.77 UIU/ML
WBC # FLD AUTO: 6.85 K/UL

## 2024-01-15 ENCOUNTER — RX RENEWAL (OUTPATIENT)
Age: 89
End: 2024-01-15

## 2024-01-15 RX ORDER — TAMSULOSIN HYDROCHLORIDE 0.4 MG/1
0.4 CAPSULE ORAL
Qty: 90 | Refills: 1 | Status: ACTIVE | COMMUNITY
Start: 2020-02-20 | End: 1900-01-01

## 2024-01-22 ENCOUNTER — APPOINTMENT (OUTPATIENT)
Dept: UROLOGY | Facility: CLINIC | Age: 89
End: 2024-01-22
Payer: MEDICARE

## 2024-01-22 VITALS — SYSTOLIC BLOOD PRESSURE: 145 MMHG | DIASTOLIC BLOOD PRESSURE: 69 MMHG | HEART RATE: 73 BPM

## 2024-01-22 PROCEDURE — 51702 INSERT TEMP BLADDER CATH: CPT

## 2024-01-25 ENCOUNTER — RX RENEWAL (OUTPATIENT)
Age: 89
End: 2024-01-25

## 2024-03-04 ENCOUNTER — APPOINTMENT (OUTPATIENT)
Dept: UROLOGY | Facility: CLINIC | Age: 89
End: 2024-03-04
Payer: MEDICARE

## 2024-03-04 VITALS
HEART RATE: 76 BPM | BODY MASS INDEX: 29.02 KG/M2 | SYSTOLIC BLOOD PRESSURE: 137 MMHG | DIASTOLIC BLOOD PRESSURE: 68 MMHG | HEIGHT: 64 IN | WEIGHT: 170 LBS

## 2024-03-04 PROCEDURE — 51702 INSERT TEMP BLADDER CATH: CPT

## 2024-03-25 ENCOUNTER — APPOINTMENT (OUTPATIENT)
Dept: ORTHOPEDIC SURGERY | Facility: CLINIC | Age: 89
End: 2024-03-25
Payer: MEDICARE

## 2024-03-25 VITALS
HEIGHT: 64 IN | BODY MASS INDEX: 29.02 KG/M2 | HEART RATE: 78 BPM | DIASTOLIC BLOOD PRESSURE: 75 MMHG | SYSTOLIC BLOOD PRESSURE: 146 MMHG | WEIGHT: 170 LBS

## 2024-03-25 DIAGNOSIS — Z96.641 AFTERCARE FOLLOWING JOINT REPLACEMENT SURGERY: ICD-10-CM

## 2024-03-25 DIAGNOSIS — Z47.1 AFTERCARE FOLLOWING JOINT REPLACEMENT SURGERY: ICD-10-CM

## 2024-03-25 DIAGNOSIS — Z96.641 PRESENCE OF RIGHT ARTIFICIAL HIP JOINT: ICD-10-CM

## 2024-03-25 PROCEDURE — 73502 X-RAY EXAM HIP UNI 2-3 VIEWS: CPT

## 2024-03-25 PROCEDURE — 99214 OFFICE O/P EST MOD 30 MIN: CPT

## 2024-03-25 NOTE — PHYSICAL EXAM
[de-identified] : GENERAL APPEARANCE: Well nourished and hydrated, pleasant, alert, and oriented x 3. Appears their stated age.  HEENT: Normocephalic, extraocular eye motion intact. Nasal septum midline. Oral cavity clear. External auditory canal clear.  RESPIRATORY: Breath sounds clear and audible in all lobes. No wheezing, No accessory muscle use. CARDIOVASCULAR: No apparent abnormalities. No lower leg edema. No varicosities. Pedal pulses are palpable. NEUROLOGIC: Sensation is normal, no muscle weakness in the upper or lower extremities. DERMATOLOGIC: No apparent skin lesions, moist, warm, no rash. SPINE: Cervical spine appears normal and moves freely; thoracic spine appears normal and moves freely; lumbosacral spine appears normal and moves freely, normal, nontender. MUSCULOSKELETAL: Hands, wrists, and elbows are normal and move freely, shoulders are normal and move freely.  Musculoskeletal 5/5 motor strength in bilateral lower extremities. Sensory: Intact in bilateral lower extremities. DTRs: Biceps, brachioradialis, triceps, patellar, ankle and plantar 2+ and symmetric bilaterally. Pulses: dorsalis pedis, posterior tibial, femoral, popliteal, and radial 2+ and symmetric bilaterally.  Constitutional: Alert and in no acute distress, but well-appearing.   [de-identified] : Right hip incision is healed no erythema.  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

## 2024-03-25 NOTE — DISCUSSION/SUMMARY
[de-identified] : 89 y/o M pt presents s/p right ROMINA on 3/24/23. The pt is here for 1 year post op visit. The nature of his condition and treatment options were discussed in detail. He should continue to do low impact exercises. Pt understands the importance of prophylaxis for invasive dental procedures. We reassured the pt that his implants are stable with no evidence of loosening or wear. The pt is overall happy with the surgical outcome. The pt will f/u 5 years from surgery for radiographic surveillance.   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 the pt is a candidate for a left ROMINA, but is not symptomatic at this time. He defers from left hip treatment at this time. The pt will f/y PRN.  [Medication Risks Reviewed] : Medication risks reviewed

## 2024-03-25 NOTE — END OF VISIT
[FreeTextEntry3] : I, Julian Umanzor, acted solely as a scribe for Dr. Alessandro Sarmiento on 03/25/2024

## 2024-03-25 NOTE — HISTORY OF PRESENT ILLNESS
[de-identified] : 89 y/o M pt presents s/p right ROMINA on 3/24/23. The pt is here for 1 year post op visit. He denies pain in the hips. He is able to perform activities of daily living as a desire. Patient walks without walking assistive device. The pt is overall happy with the surgical outcome.  [Stable] : stable [0] : a minimum pain level of 0/10 [1] : a maximum pain level of 1/10

## 2024-04-08 ENCOUNTER — APPOINTMENT (OUTPATIENT)
Dept: UROLOGY | Facility: CLINIC | Age: 89
End: 2024-04-08

## 2024-04-21 ENCOUNTER — RX RENEWAL (OUTPATIENT)
Age: 89
End: 2024-04-21

## 2024-04-21 RX ORDER — NEBIVOLOL 2.5 MG/1
2.5 TABLET ORAL
Qty: 90 | Refills: 2 | Status: ACTIVE | COMMUNITY
Start: 2022-12-02 | End: 1900-01-01

## 2024-05-01 ENCOUNTER — APPOINTMENT (OUTPATIENT)
Dept: UROLOGY | Facility: CLINIC | Age: 89
End: 2024-05-01
Payer: MEDICARE

## 2024-05-01 DIAGNOSIS — R33.9 RETENTION OF URINE, UNSPECIFIED: ICD-10-CM

## 2024-05-01 PROCEDURE — 99213 OFFICE O/P EST LOW 20 MIN: CPT | Mod: 25

## 2024-05-01 PROCEDURE — 51702 INSERT TEMP BLADDER CATH: CPT

## 2024-05-01 NOTE — HISTORY OF PRESENT ILLNESS
[FreeTextEntry1] : see previous notes still with clement catheter missed his clement exchange  discussed with patient again understands regarding bladder stones and not interested in treatment still concerned regarding kidney function and possible need for dialysis due to anesthesia patient is comfortable with the clement as is and would only continue with routine exchanges

## 2024-05-14 ENCOUNTER — APPOINTMENT (OUTPATIENT)
Dept: UROLOGY | Facility: CLINIC | Age: 89
End: 2024-05-14

## 2024-05-23 ENCOUNTER — APPOINTMENT (OUTPATIENT)
Dept: INTERNAL MEDICINE | Facility: CLINIC | Age: 89
End: 2024-05-23
Payer: MEDICARE

## 2024-05-23 VITALS
BODY MASS INDEX: 28.51 KG/M2 | HEART RATE: 63 BPM | OXYGEN SATURATION: 99 % | HEIGHT: 64 IN | SYSTOLIC BLOOD PRESSURE: 124 MMHG | DIASTOLIC BLOOD PRESSURE: 80 MMHG | WEIGHT: 167 LBS

## 2024-05-23 DIAGNOSIS — N21.0 CALCULUS IN BLADDER: ICD-10-CM

## 2024-05-23 DIAGNOSIS — R06.09 OTHER FORMS OF DYSPNEA: ICD-10-CM

## 2024-05-23 DIAGNOSIS — N18.9 CHRONIC KIDNEY DISEASE, UNSPECIFIED: ICD-10-CM

## 2024-05-23 PROCEDURE — 99214 OFFICE O/P EST MOD 30 MIN: CPT

## 2024-05-23 NOTE — HISTORY OF PRESENT ILLNESS
[Family Member] : family member [FreeTextEntry1] : FOLLOW UP ON HTN ESRD [de-identified] : 89  PT HERE FOR FOLLOW UP  FEELS OK   S/P HIPS SURGERY DR DUMONT AT [place]  03/23 THEN WENT TO REHAB BUT DEVELOPED  ISSUE AND WENT HOME AFTER 5 DAYS PT HAS BEEN SEEING DR ARACELY HENRY AND NOWIS SEEING DR OLIVERA  AT [facility] HAS  DRAPER CATHETER   PT S/P FISTULA LEFT WRIST WHICH IS NOW  IN PREPARATION FOR EVENUTAL DIALYSIS' HERE WITH SON  PT STILLWITH DRAPER  AND HS NOT STARATED DIALYSIS

## 2024-05-23 NOTE — PLAN
[FreeTextEntry1] : 89  PT HERE FOR FOLLOW UP  FEELS OK   S/P HIPS SURGERY DR DUMONT AT [place]  03/23 THEN WENT TO REHAB BUT DEVELOPED  ISSUE AND WENT HOME AFTER 5 DAYS PT HAS BEEN SEEING DR ARACELY HENRY AND NOWIS SEEING DR OLIVERA  AT [facility] HAS  DRAPER CATHETER   PT S/P FISTULA LEFT WRIST WHICH IS NOW  IN PREPARATION FOR EVENUTAL DIALYSIS' HERE WITH SON  PT STILLWITH DRAPER  AND HS NOT STARTED DIALYSIS HAS FOLLOWUP WITH DR SOMMERS NEPHROLOGIST [facility] AND HAD    BLOOD WORK RECENTLY  WILL DEFER LABS TODAY

## 2024-06-10 ENCOUNTER — APPOINTMENT (OUTPATIENT)
Dept: DERMATOLOGY | Facility: CLINIC | Age: 89
End: 2024-06-10
Payer: MEDICARE

## 2024-06-10 ENCOUNTER — APPOINTMENT (OUTPATIENT)
Dept: UROLOGY | Facility: CLINIC | Age: 89
End: 2024-06-10
Payer: MEDICARE

## 2024-06-10 VITALS
OXYGEN SATURATION: 98 % | DIASTOLIC BLOOD PRESSURE: 71 MMHG | WEIGHT: 165 LBS | BODY MASS INDEX: 28.17 KG/M2 | HEART RATE: 69 BPM | HEIGHT: 64 IN | SYSTOLIC BLOOD PRESSURE: 148 MMHG

## 2024-06-10 DIAGNOSIS — N40.0 BENIGN PROSTATIC HYPERPLASIA WITHOUT LOWER URINARY TRACT SYMPMS: ICD-10-CM

## 2024-06-10 PROCEDURE — 11102 TANGNTL BX SKIN SINGLE LES: CPT

## 2024-06-10 PROCEDURE — 99213 OFFICE O/P EST LOW 20 MIN: CPT | Mod: 25

## 2024-06-10 PROCEDURE — 51702 INSERT TEMP BLADDER CATH: CPT

## 2024-06-11 ENCOUNTER — APPOINTMENT (OUTPATIENT)
Dept: PULMONOLOGY | Facility: CLINIC | Age: 89
End: 2024-06-11
Payer: MEDICARE

## 2024-06-11 VITALS — BODY MASS INDEX: 28 KG/M2 | WEIGHT: 164 LBS | HEIGHT: 64 IN

## 2024-06-11 VITALS
OXYGEN SATURATION: 95 % | RESPIRATION RATE: 16 BRPM | DIASTOLIC BLOOD PRESSURE: 70 MMHG | HEART RATE: 68 BPM | SYSTOLIC BLOOD PRESSURE: 130 MMHG

## 2024-06-11 PROCEDURE — 99213 OFFICE O/P EST LOW 20 MIN: CPT

## 2024-06-11 PROCEDURE — G2211 COMPLEX E/M VISIT ADD ON: CPT

## 2024-06-11 RX ORDER — UMECLIDINIUM BROMIDE AND VILANTEROL TRIFENATATE 62.5; 25 UG/1; UG/1
62.5-25 POWDER RESPIRATORY (INHALATION)
Qty: 3 | Refills: 3 | Status: ACTIVE | COMMUNITY
Start: 2022-05-11 | End: 1900-01-01

## 2024-06-11 NOTE — ASSESSMENT
[FreeTextEntry1] : 89M PMH mild COPD, HTN, HLD, CKD4, VLADIMIR (untreated) who presents for f/u cough/SOB, COPD.  - Refilled Anoro ellipta - Lungs clear on exam - Fatigue likely 2/2 deconditioning, advanced age - F/u with PCP - Will re-assess in 6 mo time  The patient expressed understanding and agreement with the plan as outlined above and accepts responsibility to be compliant with any recommended testing, treatment, and follow-up visits.  All relevant questions and concerns were addressed.  25 minutes of time were spent on the encounter. Medical records were reviewed, including but not limited to hospital records, outpatient records, laboratory data, and diagnostic imaging studies. Greater than 50% of the face-to-face encounter time was spent on counseling and/or coordination of care.  Alessandro Ward MD, [facility] at [place] [address] [place], [place] [address] T: [phone] F: [phone]

## 2024-06-11 NOTE — HISTORY OF PRESENT ILLNESS
[Former] : former [TextBox_4] : 89M PMH mild COPD, HTN, HLD, CKD4, VLADIMIR (untreated) who presents for f/u cough/SOB, COPD. Pt is on Anoro ellipta but ran out a week ago. Overall he is feeling fatigued, mostly with physical exertion and carrying items. No fevers or chills or chest pains. No N/V/D. No recent hospitalizations.  [TextBox_11] : 1 [TextBox_13] : 10 [YearQuit] : 1963

## 2024-06-20 ENCOUNTER — APPOINTMENT (OUTPATIENT)
Dept: DERMATOLOGY | Facility: CLINIC | Age: 89
End: 2024-06-20

## 2024-06-21 ENCOUNTER — APPOINTMENT (OUTPATIENT)
Dept: DERMATOLOGY | Facility: CLINIC | Age: 89
End: 2024-06-21

## 2024-07-16 ENCOUNTER — APPOINTMENT (OUTPATIENT)
Dept: UROLOGY | Facility: CLINIC | Age: 89
End: 2024-07-16

## 2024-07-17 ENCOUNTER — APPOINTMENT (OUTPATIENT)
Dept: UROLOGY | Facility: CLINIC | Age: 89
End: 2024-07-17
Payer: MEDICARE

## 2024-07-17 DIAGNOSIS — R33.9 RETENTION OF URINE, UNSPECIFIED: ICD-10-CM

## 2024-07-17 PROCEDURE — 51702 INSERT TEMP BLADDER CATH: CPT

## 2024-07-24 ENCOUNTER — APPOINTMENT (OUTPATIENT)
Dept: PULMONOLOGY | Facility: CLINIC | Age: 89
End: 2024-07-24

## 2024-07-26 ENCOUNTER — APPOINTMENT (OUTPATIENT)
Dept: PULMONOLOGY | Facility: CLINIC | Age: 89
End: 2024-07-26

## 2024-08-04 NOTE — HISTORY OF PRESENT ILLNESS
Signs Of Vitality Reviewed: Yes    IMPORTANT EVENTS THIS SHIFT:  [id number] Bedside shift report rec'vd. Pt updated with plan of care. Pt being tx for acute hypoxic resp fail. A&Ox4. CBR. Repo Q 2hrs as pt permits. Cardiac monitor-ST. Trach with 3L 28% trach/mask collar. Glorial[place] 6, ambu bag, trach suction kits at bedside. PRN suction completed. Trach care completed. NPO-continuous feeding Jevity 1.5 at 60mL/hr. Flus 50mL Q 4hrs. 1 packet supplement ensure given via PEG tube. Swallow eval Monday. Accuchecks Q 6hrs. Biliary tube with good drainage-fluid brown/green. Turkmen speaking-pt refusing  services at this time. Family at bedside. Good urine output. No BM overnight. Assessment of patient completed. Elevated HR, Low HgB but stable, + cxs, + yeast. Fall precautions in place with bed alarm on. Pt denies any pain or discomfort. All medications given per MAR orders-patient tolerated well with no adverse reactions. Meds crushed and given via PEG tube. Hourly rounding on patient completed per protocol. All needs met during rounds. All questions and concerns addressed. Pt resting comfortably. Will continue to monitor. Pt stable at hand-off. Iso PPE worn at all times per contact precautions.    IMPORTANT EVENTS COMING UP/GOALS (PLEASE INCLUDE WHITE BOARD AND DISCHARGE BOARD UPDATES):   PATIENT SPECIAL NEEDS/ACCOMMODATIONS:                  [Family Member] : family member [FreeTextEntry1] : FOLLOW UP ON BP [de-identified] : PT HERE FOR FOLLOW UP  FEELS OK  HAS SEEN DR SOMMERS FROM [facility] OVERALL FEELS OK NO MAJOR COMPLAINTS \par NO CP OR SOB   HAS RIGHT HIP PAIN   HAS SEEN DR FLORENCE JASMINE HAD INJECTION OVERALL FEELS OK ALOS HAS SEEN DR URENA OF PULM  USES INHALER\par OVERALL FEELS OK  NO CP

## 2024-08-07 ENCOUNTER — APPOINTMENT (OUTPATIENT)
Dept: DERMATOLOGY | Facility: CLINIC | Age: 89
End: 2024-08-07

## 2024-08-07 ENCOUNTER — APPOINTMENT (OUTPATIENT)
Dept: PULMONOLOGY | Facility: CLINIC | Age: 89
End: 2024-08-07

## 2024-08-07 PROCEDURE — 99213 OFFICE O/P EST LOW 20 MIN: CPT

## 2024-08-26 ENCOUNTER — APPOINTMENT (OUTPATIENT)
Dept: UROLOGY | Facility: CLINIC | Age: 89
End: 2024-08-26
Payer: MEDICARE

## 2024-08-26 VITALS
RESPIRATION RATE: 16 BRPM | DIASTOLIC BLOOD PRESSURE: 64 MMHG | OXYGEN SATURATION: 98 % | HEART RATE: 74 BPM | HEIGHT: 64 IN | BODY MASS INDEX: 28 KG/M2 | WEIGHT: 164 LBS | SYSTOLIC BLOOD PRESSURE: 113 MMHG

## 2024-08-26 DIAGNOSIS — R33.9 RETENTION OF URINE, UNSPECIFIED: ICD-10-CM

## 2024-08-26 PROCEDURE — 51702 INSERT TEMP BLADDER CATH: CPT

## 2024-09-12 ENCOUNTER — APPOINTMENT (OUTPATIENT)
Dept: DERMATOLOGY | Facility: CLINIC | Age: 89
End: 2024-09-12
Payer: MEDICARE

## 2024-09-12 PROCEDURE — 99213 OFFICE O/P EST LOW 20 MIN: CPT

## 2024-09-16 ENCOUNTER — APPOINTMENT (OUTPATIENT)
Dept: DERMATOLOGY | Facility: CLINIC | Age: 89
End: 2024-09-16

## 2024-09-30 ENCOUNTER — APPOINTMENT (OUTPATIENT)
Dept: DERMATOLOGY | Facility: CLINIC | Age: 89
End: 2024-09-30

## 2024-09-30 ENCOUNTER — APPOINTMENT (OUTPATIENT)
Dept: UROLOGY | Facility: CLINIC | Age: 89
End: 2024-09-30
Payer: MEDICARE

## 2024-09-30 VITALS
WEIGHT: 162 LBS | HEART RATE: 77 BPM | HEIGHT: 64 IN | BODY MASS INDEX: 27.66 KG/M2 | DIASTOLIC BLOOD PRESSURE: 65 MMHG | SYSTOLIC BLOOD PRESSURE: 121 MMHG

## 2024-09-30 DIAGNOSIS — R33.9 RETENTION OF URINE, UNSPECIFIED: ICD-10-CM

## 2024-09-30 PROCEDURE — 51702 INSERT TEMP BLADDER CATH: CPT

## 2024-10-15 ENCOUNTER — NON-APPOINTMENT (OUTPATIENT)
Age: 89
End: 2024-10-15

## 2024-11-11 ENCOUNTER — APPOINTMENT (OUTPATIENT)
Dept: UROLOGY | Facility: CLINIC | Age: 89
End: 2024-11-11
Payer: MEDICARE

## 2024-11-11 DIAGNOSIS — R33.9 RETENTION OF URINE, UNSPECIFIED: ICD-10-CM

## 2024-11-11 PROCEDURE — 99215 OFFICE O/P EST HI 40 MIN: CPT

## 2024-11-21 ENCOUNTER — APPOINTMENT (OUTPATIENT)
Dept: INTERNAL MEDICINE | Facility: CLINIC | Age: 89
End: 2024-11-21
Payer: MEDICARE

## 2024-11-21 VITALS
DIASTOLIC BLOOD PRESSURE: 55 MMHG | WEIGHT: 162 LBS | SYSTOLIC BLOOD PRESSURE: 120 MMHG | HEIGHT: 64 IN | BODY MASS INDEX: 27.66 KG/M2

## 2024-11-21 DIAGNOSIS — D64.9 ANEMIA, UNSPECIFIED: ICD-10-CM

## 2024-11-21 DIAGNOSIS — N18.9 CHRONIC KIDNEY DISEASE, UNSPECIFIED: ICD-10-CM

## 2024-11-21 DIAGNOSIS — I10 ESSENTIAL (PRIMARY) HYPERTENSION: ICD-10-CM

## 2024-11-21 DIAGNOSIS — E78.5 HYPERLIPIDEMIA, UNSPECIFIED: ICD-10-CM

## 2024-11-21 PROCEDURE — 99214 OFFICE O/P EST MOD 30 MIN: CPT

## 2024-12-11 ENCOUNTER — APPOINTMENT (OUTPATIENT)
Dept: PULMONOLOGY | Facility: CLINIC | Age: 88
End: 2024-12-11
Payer: MEDICARE

## 2024-12-11 VITALS
OXYGEN SATURATION: 97 % | RESPIRATION RATE: 16 BRPM | SYSTOLIC BLOOD PRESSURE: 140 MMHG | WEIGHT: 166 LBS | DIASTOLIC BLOOD PRESSURE: 60 MMHG | HEART RATE: 68 BPM | HEIGHT: 62 IN | BODY MASS INDEX: 30.55 KG/M2

## 2024-12-11 PROCEDURE — G2211 COMPLEX E/M VISIT ADD ON: CPT

## 2024-12-11 PROCEDURE — 99213 OFFICE O/P EST LOW 20 MIN: CPT

## 2024-12-16 ENCOUNTER — APPOINTMENT (OUTPATIENT)
Dept: UROLOGY | Facility: CLINIC | Age: 88
End: 2024-12-16
Payer: MEDICARE

## 2024-12-16 DIAGNOSIS — R33.9 RETENTION OF URINE, UNSPECIFIED: ICD-10-CM

## 2024-12-16 DIAGNOSIS — N21.0 CALCULUS IN BLADDER: ICD-10-CM

## 2024-12-16 DIAGNOSIS — N40.0 BENIGN PROSTATIC HYPERPLASIA WITHOUT LOWER URINARY TRACT SYMPMS: ICD-10-CM

## 2024-12-16 PROCEDURE — 51702 INSERT TEMP BLADDER CATH: CPT

## 2025-01-11 ENCOUNTER — RX RENEWAL (OUTPATIENT)
Age: 89
End: 2025-01-11

## 2025-02-03 ENCOUNTER — APPOINTMENT (OUTPATIENT)
Dept: UROLOGY | Facility: CLINIC | Age: 89
End: 2025-02-03
Payer: MEDICARE

## 2025-02-03 DIAGNOSIS — R33.9 RETENTION OF URINE, UNSPECIFIED: ICD-10-CM

## 2025-02-03 PROCEDURE — 51702 INSERT TEMP BLADDER CATH: CPT

## 2025-03-10 ENCOUNTER — APPOINTMENT (OUTPATIENT)
Dept: UROLOGY | Facility: CLINIC | Age: 89
End: 2025-03-10
Payer: MEDICARE

## 2025-03-10 DIAGNOSIS — R33.9 RETENTION OF URINE, UNSPECIFIED: ICD-10-CM

## 2025-03-10 PROCEDURE — 51702 INSERT TEMP BLADDER CATH: CPT

## 2025-03-24 ENCOUNTER — APPOINTMENT (OUTPATIENT)
Dept: DERMATOLOGY | Facility: CLINIC | Age: 89
End: 2025-03-24
Payer: MEDICARE

## 2025-03-24 PROCEDURE — 17000 DESTRUCT PREMALG LESION: CPT

## 2025-03-24 PROCEDURE — 99213 OFFICE O/P EST LOW 20 MIN: CPT | Mod: 25

## 2025-03-24 PROCEDURE — 17003 DESTRUCT PREMALG LES 2-14: CPT | Mod: 59

## 2025-04-21 ENCOUNTER — APPOINTMENT (OUTPATIENT)
Dept: UROLOGY | Facility: CLINIC | Age: 89
End: 2025-04-21
Payer: MEDICARE

## 2025-04-21 VITALS
BODY MASS INDEX: 30.18 KG/M2 | SYSTOLIC BLOOD PRESSURE: 121 MMHG | WEIGHT: 164 LBS | HEIGHT: 62 IN | DIASTOLIC BLOOD PRESSURE: 73 MMHG | HEART RATE: 67 BPM

## 2025-04-21 DIAGNOSIS — R33.9 RETENTION OF URINE, UNSPECIFIED: ICD-10-CM

## 2025-04-21 DIAGNOSIS — N40.0 BENIGN PROSTATIC HYPERPLASIA WITHOUT LOWER URINARY TRACT SYMPMS: ICD-10-CM

## 2025-04-21 PROCEDURE — 51702 INSERT TEMP BLADDER CATH: CPT

## 2025-04-24 NOTE — DISCHARGE NOTE PROVIDER - ATTENDING ATTESTATION STATEMENT
Duplicate request. Medication already released to the pharmacy today.   Pended order removed.    I have personally seen and examined the patient. I have collaborated with and supervised the

## 2025-05-08 ENCOUNTER — APPOINTMENT (OUTPATIENT)
Dept: DERMATOLOGY | Facility: CLINIC | Age: 89
End: 2025-05-08
Payer: MEDICARE

## 2025-05-08 PROCEDURE — 99213 OFFICE O/P EST LOW 20 MIN: CPT

## 2025-05-22 ENCOUNTER — APPOINTMENT (OUTPATIENT)
Dept: INTERNAL MEDICINE | Facility: CLINIC | Age: 89
End: 2025-05-22

## 2025-05-22 VITALS
SYSTOLIC BLOOD PRESSURE: 115 MMHG | WEIGHT: 162 LBS | BODY MASS INDEX: 29.81 KG/M2 | HEIGHT: 62 IN | DIASTOLIC BLOOD PRESSURE: 52 MMHG

## 2025-05-22 DIAGNOSIS — N18.9 CHRONIC KIDNEY DISEASE, UNSPECIFIED: ICD-10-CM

## 2025-05-22 DIAGNOSIS — I10 ESSENTIAL (PRIMARY) HYPERTENSION: ICD-10-CM

## 2025-05-22 DIAGNOSIS — R06.09 OTHER FORMS OF DYSPNEA: ICD-10-CM

## 2025-05-22 PROCEDURE — 99214 OFFICE O/P EST MOD 30 MIN: CPT

## 2025-05-22 PROCEDURE — 36415 COLL VENOUS BLD VENIPUNCTURE: CPT

## 2025-05-22 PROCEDURE — G2211 COMPLEX E/M VISIT ADD ON: CPT

## 2025-05-23 ENCOUNTER — RX RENEWAL (OUTPATIENT)
Age: 89
End: 2025-05-23

## 2025-05-26 LAB
CHOLEST SERPL-MCNC: 109 MG/DL
HDLC SERPL-MCNC: 37 MG/DL
LDLC SERPL-MCNC: 53 MG/DL
NONHDLC SERPL-MCNC: 72 MG/DL
TRIGL SERPL-MCNC: 104 MG/DL
TSH SERPL-ACNC: 3.49 UIU/ML

## 2025-06-02 ENCOUNTER — NON-APPOINTMENT (OUTPATIENT)
Age: 89
End: 2025-06-02

## 2025-06-02 ENCOUNTER — APPOINTMENT (OUTPATIENT)
Dept: UROLOGY | Facility: CLINIC | Age: 89
End: 2025-06-02
Payer: MEDICARE

## 2025-06-02 VITALS
OXYGEN SATURATION: 98 % | HEIGHT: 64 IN | HEART RATE: 64 BPM | WEIGHT: 164 LBS | SYSTOLIC BLOOD PRESSURE: 91 MMHG | DIASTOLIC BLOOD PRESSURE: 54 MMHG | BODY MASS INDEX: 28 KG/M2

## 2025-06-02 DIAGNOSIS — R33.9 RETENTION OF URINE, UNSPECIFIED: ICD-10-CM

## 2025-06-02 PROCEDURE — 51702 INSERT TEMP BLADDER CATH: CPT

## 2025-06-11 ENCOUNTER — APPOINTMENT (OUTPATIENT)
Dept: DERMATOLOGY | Facility: CLINIC | Age: 89
End: 2025-06-11
Payer: MEDICARE

## 2025-06-11 PROCEDURE — 17003 DESTRUCT PREMALG LES 2-14: CPT

## 2025-06-11 PROCEDURE — 17000 DESTRUCT PREMALG LESION: CPT

## 2025-06-11 PROCEDURE — 99213 OFFICE O/P EST LOW 20 MIN: CPT | Mod: 25

## 2025-07-07 ENCOUNTER — APPOINTMENT (OUTPATIENT)
Dept: UROLOGY | Facility: CLINIC | Age: 89
End: 2025-07-07
Payer: MEDICARE

## 2025-07-07 PROCEDURE — 51702 INSERT TEMP BLADDER CATH: CPT

## 2025-07-30 ENCOUNTER — INPATIENT (INPATIENT)
Facility: HOSPITAL | Age: 89
LOS: 5 days | Discharge: HOME HEALTH | DRG: 682 | End: 2025-08-05
Attending: HOSPITALIST | Admitting: HOSPITALIST
Payer: MEDICARE

## 2025-07-30 VITALS
SYSTOLIC BLOOD PRESSURE: 137 MMHG | OXYGEN SATURATION: 97 % | RESPIRATION RATE: 18 BRPM | TEMPERATURE: 98 F | DIASTOLIC BLOOD PRESSURE: 60 MMHG | HEART RATE: 73 BPM

## 2025-07-30 DIAGNOSIS — Z90.49 ACQUIRED ABSENCE OF OTHER SPECIFIED PARTS OF DIGESTIVE TRACT: Chronic | ICD-10-CM

## 2025-07-30 DIAGNOSIS — Z98.49 CATARACT EXTRACTION STATUS, UNSPECIFIED EYE: Chronic | ICD-10-CM

## 2025-07-30 DIAGNOSIS — Z98.890 OTHER SPECIFIED POSTPROCEDURAL STATES: Chronic | ICD-10-CM

## 2025-07-30 DIAGNOSIS — Z96.641 PRESENCE OF RIGHT ARTIFICIAL HIP JOINT: Chronic | ICD-10-CM

## 2025-07-30 LAB
ALBUMIN SERPL ELPH-MCNC: 3.8 G/DL — SIGNIFICANT CHANGE UP (ref 3.3–5.2)
ALP SERPL-CCNC: 131 U/L — HIGH (ref 40–120)
ALT FLD-CCNC: 24 U/L — SIGNIFICANT CHANGE UP
ANION GAP SERPL CALC-SCNC: 18 MMOL/L — HIGH (ref 5–17)
ANION GAP SERPL CALC-SCNC: 19 MMOL/L — HIGH (ref 5–17)
APPEARANCE UR: ABNORMAL
APTT BLD: 32.5 SEC — SIGNIFICANT CHANGE UP (ref 26.1–36.8)
AST SERPL-CCNC: 23 U/L — SIGNIFICANT CHANGE UP
BACTERIA # UR AUTO: ABNORMAL /HPF
BASOPHILS # BLD AUTO: 0.01 K/UL — SIGNIFICANT CHANGE UP (ref 0–0.2)
BASOPHILS # BLD MANUAL: 0 K/UL — SIGNIFICANT CHANGE UP (ref 0–0.2)
BASOPHILS NFR BLD AUTO: 0.1 % — SIGNIFICANT CHANGE UP (ref 0–2)
BASOPHILS NFR BLD MANUAL: 0 % — SIGNIFICANT CHANGE UP (ref 0–2)
BILIRUB SERPL-MCNC: 0.8 MG/DL — SIGNIFICANT CHANGE UP (ref 0.4–2)
BILIRUB UR-MCNC: NEGATIVE — SIGNIFICANT CHANGE UP
BUN SERPL-MCNC: 95.7 MG/DL — HIGH (ref 8–20)
BUN SERPL-MCNC: 97.3 MG/DL — HIGH (ref 8–20)
CALCIUM SERPL-MCNC: 9.1 MG/DL — SIGNIFICANT CHANGE UP (ref 8.4–10.5)
CALCIUM SERPL-MCNC: 9.5 MG/DL — SIGNIFICANT CHANGE UP (ref 8.4–10.5)
CAST: 7 /LPF — HIGH (ref 0–4)
CHLORIDE SERPL-SCNC: 103 MMOL/L — SIGNIFICANT CHANGE UP (ref 96–108)
CHLORIDE SERPL-SCNC: 105 MMOL/L — SIGNIFICANT CHANGE UP (ref 96–108)
CO2 SERPL-SCNC: 10 MMOL/L — CRITICAL LOW (ref 22–29)
CO2 SERPL-SCNC: 13 MMOL/L — LOW (ref 22–29)
COLOR SPEC: YELLOW — SIGNIFICANT CHANGE UP
CREAT SERPL-MCNC: 14.53 MG/DL — HIGH (ref 0.5–1.3)
CREAT SERPL-MCNC: 15.32 MG/DL — HIGH (ref 0.5–1.3)
DACRYOCYTES BLD QL SMEAR: SLIGHT — SIGNIFICANT CHANGE UP
DIFF PNL FLD: ABNORMAL
EGFR: 3 ML/MIN/1.73M2 — LOW
EOSINOPHIL # BLD AUTO: 0.01 K/UL — SIGNIFICANT CHANGE UP (ref 0–0.5)
EOSINOPHIL # BLD MANUAL: 0 K/UL — SIGNIFICANT CHANGE UP (ref 0–0.5)
EOSINOPHIL NFR BLD AUTO: 0.1 % — SIGNIFICANT CHANGE UP (ref 0–6)
EOSINOPHIL NFR BLD MANUAL: 0 % — SIGNIFICANT CHANGE UP (ref 0–6)
FLUAV AG NPH QL: SIGNIFICANT CHANGE UP
FLUBV AG NPH QL: SIGNIFICANT CHANGE UP
GAS PNL BLDV: SIGNIFICANT CHANGE UP
GIANT PLATELETS BLD QL SMEAR: PRESENT
GLUCOSE SERPL-MCNC: 106 MG/DL — HIGH (ref 70–99)
GLUCOSE SERPL-MCNC: 118 MG/DL — HIGH (ref 70–99)
GLUCOSE UR QL: NEGATIVE MG/DL — SIGNIFICANT CHANGE UP
HCT VFR BLD CALC: 30.1 % — LOW (ref 39–50)
HGB BLD-MCNC: 9.5 G/DL — LOW (ref 13–17)
IMM GRANULOCYTES # BLD AUTO: 0.09 K/UL — HIGH (ref 0–0.07)
IMM GRANULOCYTES NFR BLD AUTO: 1 % — HIGH (ref 0–0.9)
INR BLD: 1.21 RATIO — HIGH (ref 0.85–1.16)
KETONES UR QL: NEGATIVE MG/DL — SIGNIFICANT CHANGE UP
LEUKOCYTE ESTERASE UR-ACNC: ABNORMAL
LYMPHOCYTES # BLD AUTO: 0.49 K/UL — LOW (ref 1–3.3)
LYMPHOCYTES # BLD MANUAL: 0.16 K/UL — LOW (ref 1–3.3)
LYMPHOCYTES NFR BLD AUTO: 5.3 % — LOW (ref 13–44)
LYMPHOCYTES NFR BLD MANUAL: 1.7 % — LOW (ref 13–44)
MAGNESIUM SERPL-MCNC: 1.8 MG/DL — SIGNIFICANT CHANGE UP (ref 1.6–2.6)
MCHC RBC-ENTMCNC: 30.7 PG — SIGNIFICANT CHANGE UP (ref 27–34)
MCHC RBC-ENTMCNC: 31.6 G/DL — LOW (ref 32–36)
MCV RBC AUTO: 97.4 FL — SIGNIFICANT CHANGE UP (ref 80–100)
MONOCYTES # BLD AUTO: 0.78 K/UL — SIGNIFICANT CHANGE UP (ref 0–0.9)
MONOCYTES # BLD MANUAL: 0.16 K/UL — SIGNIFICANT CHANGE UP (ref 0–0.9)
MONOCYTES NFR BLD AUTO: 8.4 % — SIGNIFICANT CHANGE UP (ref 2–14)
MONOCYTES NFR BLD MANUAL: 1.7 % — LOW (ref 2–14)
NEUTROPHILS # BLD AUTO: 7.86 K/UL — HIGH (ref 1.8–7.4)
NEUTROPHILS # BLD MANUAL: 8.93 K/UL — HIGH (ref 1.8–7.4)
NEUTROPHILS NFR BLD AUTO: 85.1 % — HIGH (ref 43–77)
NEUTROPHILS NFR BLD MANUAL: 96.6 % — HIGH (ref 43–77)
NITRITE UR-MCNC: NEGATIVE — SIGNIFICANT CHANGE UP
NRBC # BLD AUTO: 0 K/UL — SIGNIFICANT CHANGE UP (ref 0–0)
NRBC # FLD: 0 K/UL — SIGNIFICANT CHANGE UP (ref 0–0)
NRBC BLD AUTO-RTO: 0 /100 WBCS — SIGNIFICANT CHANGE UP (ref 0–0)
NT-PROBNP SERPL-SCNC: 2129 PG/ML — HIGH (ref 0–300)
PH UR: 7 — SIGNIFICANT CHANGE UP (ref 5–8)
PHOSPHATE SERPL-MCNC: 5.3 MG/DL — HIGH (ref 2.4–4.7)
PLAT MORPH BLD: NORMAL — SIGNIFICANT CHANGE UP
PLATELET # BLD AUTO: 115 K/UL — LOW (ref 150–400)
PMV BLD: 10.5 FL — SIGNIFICANT CHANGE UP (ref 7–13)
POIKILOCYTOSIS BLD QL AUTO: SLIGHT — SIGNIFICANT CHANGE UP
POTASSIUM SERPL-MCNC: 5.1 MMOL/L — SIGNIFICANT CHANGE UP (ref 3.5–5.3)
POTASSIUM SERPL-MCNC: 5.9 MMOL/L — HIGH (ref 3.5–5.3)
POTASSIUM SERPL-SCNC: 5.1 MMOL/L — SIGNIFICANT CHANGE UP (ref 3.5–5.3)
POTASSIUM SERPL-SCNC: 5.9 MMOL/L — HIGH (ref 3.5–5.3)
PROT SERPL-MCNC: 6.8 G/DL — SIGNIFICANT CHANGE UP (ref 6.6–8.7)
PROT UR-MCNC: 100 MG/DL
PROTHROM AB SERPL-ACNC: 13.7 SEC — HIGH (ref 9.9–13.4)
RBC # BLD: 3.09 M/UL — LOW (ref 4.2–5.8)
RBC # FLD: 14.7 % — HIGH (ref 10.3–14.5)
RBC BLD AUTO: ABNORMAL
RBC CASTS # UR COMP ASSIST: 11 /HPF — HIGH (ref 0–4)
RSV RNA NPH QL NAA+NON-PROBE: SIGNIFICANT CHANGE UP
SARS-COV-2 RNA SPEC QL NAA+PROBE: SIGNIFICANT CHANGE UP
SODIUM SERPL-SCNC: 134 MMOL/L — LOW (ref 135–145)
SODIUM SERPL-SCNC: 134 MMOL/L — LOW (ref 135–145)
SOURCE RESPIRATORY: SIGNIFICANT CHANGE UP
SP GR SPEC: 1.02 — SIGNIFICANT CHANGE UP (ref 1–1.03)
SQUAMOUS # UR AUTO: 0 /HPF — SIGNIFICANT CHANGE UP (ref 0–5)
TROPONIN T, HIGH SENSITIVITY RESULT: 65 NG/L — HIGH (ref 0–51)
UROBILINOGEN FLD QL: 0.2 MG/DL — SIGNIFICANT CHANGE UP (ref 0.2–1)
WBC # BLD: 9.24 K/UL — SIGNIFICANT CHANGE UP (ref 3.8–10.5)
WBC # FLD AUTO: 9.24 K/UL — SIGNIFICANT CHANGE UP (ref 3.8–10.5)
WBC UR QL: 114 /HPF — HIGH (ref 0–5)

## 2025-07-30 PROCEDURE — 99291 CRITICAL CARE FIRST HOUR: CPT

## 2025-07-30 PROCEDURE — 99223 1ST HOSP IP/OBS HIGH 75: CPT

## 2025-07-30 PROCEDURE — 93010 ELECTROCARDIOGRAM REPORT: CPT

## 2025-07-30 PROCEDURE — 71045 X-RAY EXAM CHEST 1 VIEW: CPT | Mod: 26

## 2025-07-30 RX ORDER — DEXTROSE 50 % IN WATER 50 %
50 SYRINGE (ML) INTRAVENOUS ONCE
Refills: 0 | Status: DISCONTINUED | OUTPATIENT
Start: 2025-07-30 | End: 2025-07-30

## 2025-07-30 RX ORDER — SODIUM BICARBONATE 1 MEQ/ML
50 SYRINGE (ML) INTRAVENOUS ONCE
Refills: 0 | Status: DISCONTINUED | OUTPATIENT
Start: 2025-07-30 | End: 2025-08-02

## 2025-07-30 RX ORDER — SODIUM ZIRCONIUM CYCLOSILICATE 5 G/5G
10 POWDER, FOR SUSPENSION ORAL DAILY
Refills: 0 | Status: COMPLETED | OUTPATIENT
Start: 2025-07-30 | End: 2025-07-31

## 2025-07-30 RX ORDER — MEROPENEM 1 G/30ML
500 INJECTION INTRAVENOUS ONCE
Refills: 0 | Status: COMPLETED | OUTPATIENT
Start: 2025-07-30 | End: 2025-07-30

## 2025-07-30 RX ORDER — LIDOCAINE HCL/PF 10 MG/ML
10 VIAL (ML) INJECTION ONCE
Refills: 0 | Status: COMPLETED | OUTPATIENT
Start: 2025-07-30 | End: 2025-07-30

## 2025-07-30 RX ORDER — DEXTROSE 50 % IN WATER 50 %
50 SYRINGE (ML) INTRAVENOUS ONCE
Refills: 0 | Status: COMPLETED | OUTPATIENT
Start: 2025-07-30 | End: 2025-07-30

## 2025-07-30 RX ORDER — EPOETIN ALFA 10000 [IU]/ML
20000 SOLUTION INTRAVENOUS; SUBCUTANEOUS ONCE
Refills: 0 | Status: COMPLETED | OUTPATIENT
Start: 2025-07-30 | End: 2025-07-30

## 2025-07-30 RX ORDER — SODIUM BICARBONATE 1 MEQ/ML
50 SYRINGE (ML) INTRAVENOUS ONCE
Refills: 0 | Status: COMPLETED | OUTPATIENT
Start: 2025-07-30 | End: 2025-07-30

## 2025-07-30 RX ADMIN — Medication 50 MILLIEQUIVALENT(S): at 23:45

## 2025-07-30 RX ADMIN — SODIUM ZIRCONIUM CYCLOSILICATE 10 GRAM(S): 5 POWDER, FOR SUSPENSION ORAL at 21:58

## 2025-07-30 RX ADMIN — Medication 5 UNIT(S): at 21:39

## 2025-07-30 RX ADMIN — Medication 50 MILLILITER(S): at 21:40

## 2025-07-30 RX ADMIN — MEROPENEM 500 MILLIGRAM(S): 1 INJECTION INTRAVENOUS at 21:58

## 2025-07-30 RX ADMIN — Medication 10 MILLILITER(S): at 18:35

## 2025-07-31 DIAGNOSIS — N17.9 ACUTE KIDNEY FAILURE, UNSPECIFIED: ICD-10-CM

## 2025-07-31 LAB
24R-OH-CALCIDIOL SERPL-MCNC: 59.4 NG/ML — SIGNIFICANT CHANGE UP
ANION GAP SERPL CALC-SCNC: 17 MMOL/L — SIGNIFICANT CHANGE UP (ref 5–17)
ANION GAP SERPL CALC-SCNC: 19 MMOL/L — HIGH (ref 5–17)
BLD GP AB SCN SERPL QL: SIGNIFICANT CHANGE UP
BUN SERPL-MCNC: 57.7 MG/DL — HIGH (ref 8–20)
BUN SERPL-MCNC: 98.6 MG/DL — HIGH (ref 8–20)
CALCIUM SERPL-MCNC: 8.9 MG/DL — SIGNIFICANT CHANGE UP (ref 8.4–10.5)
CALCIUM SERPL-MCNC: 9.1 MG/DL — SIGNIFICANT CHANGE UP (ref 8.4–10.5)
CALCIUM SERPL-MCNC: 9.4 MG/DL — SIGNIFICANT CHANGE UP (ref 8.4–10.5)
CHLORIDE SERPL-SCNC: 100 MMOL/L — SIGNIFICANT CHANGE UP (ref 96–108)
CHLORIDE SERPL-SCNC: 105 MMOL/L — SIGNIFICANT CHANGE UP (ref 96–108)
CO2 SERPL-SCNC: 10 MMOL/L — CRITICAL LOW (ref 22–29)
CO2 SERPL-SCNC: 19 MMOL/L — LOW (ref 22–29)
CREAT SERPL-MCNC: 14.76 MG/DL — HIGH (ref 0.5–1.3)
CREAT SERPL-MCNC: 9.22 MG/DL — HIGH (ref 0.5–1.3)
EGFR: 3 ML/MIN/1.73M2 — LOW
EGFR: 3 ML/MIN/1.73M2 — LOW
EGFR: 5 ML/MIN/1.73M2 — LOW
EGFR: 5 ML/MIN/1.73M2 — LOW
FERRITIN SERPL-MCNC: 320 NG/ML — SIGNIFICANT CHANGE UP (ref 30–400)
GLUCOSE BLDC GLUCOMTR-MCNC: 93 MG/DL — SIGNIFICANT CHANGE UP (ref 70–99)
GLUCOSE SERPL-MCNC: 126 MG/DL — HIGH (ref 70–99)
GLUCOSE SERPL-MCNC: 77 MG/DL — SIGNIFICANT CHANGE UP (ref 70–99)
HCT VFR BLD CALC: 24.7 % — LOW (ref 39–50)
HCT VFR BLD CALC: 28.5 % — LOW (ref 39–50)
HGB BLD-MCNC: 8.1 G/DL — LOW (ref 13–17)
HGB BLD-MCNC: 9.3 G/DL — LOW (ref 13–17)
IMMATURE PLATELET FRACTION #: 1.8 K/UL — LOW (ref 3.9–12.5)
IMMATURE PLATELET FRACTION %: 1.8 % — SIGNIFICANT CHANGE UP (ref 1.6–7.1)
IRON SATN MFR SERPL: 23 UG/DL — LOW (ref 59–158)
IRON SATN MFR SERPL: 8 % — LOW (ref 16–55)
MAGNESIUM SERPL-MCNC: 1.7 MG/DL — SIGNIFICANT CHANGE UP (ref 1.6–2.6)
MCHC RBC-ENTMCNC: 30.3 PG — SIGNIFICANT CHANGE UP (ref 27–34)
MCHC RBC-ENTMCNC: 32.5 PG — SIGNIFICANT CHANGE UP (ref 27–34)
MCHC RBC-ENTMCNC: 32.6 G/DL — SIGNIFICANT CHANGE UP (ref 32–36)
MCHC RBC-ENTMCNC: 32.8 G/DL — SIGNIFICANT CHANGE UP (ref 32–36)
MCV RBC AUTO: 92.8 FL — SIGNIFICANT CHANGE UP (ref 80–100)
MCV RBC AUTO: 99.2 FL — SIGNIFICANT CHANGE UP (ref 80–100)
NRBC # BLD AUTO: 0 K/UL — SIGNIFICANT CHANGE UP (ref 0–0)
NRBC # BLD AUTO: 0 K/UL — SIGNIFICANT CHANGE UP (ref 0–0)
NRBC # FLD: 0 K/UL — SIGNIFICANT CHANGE UP (ref 0–0)
NRBC # FLD: 0 K/UL — SIGNIFICANT CHANGE UP (ref 0–0)
NRBC BLD AUTO-RTO: 0 /100 WBCS — SIGNIFICANT CHANGE UP (ref 0–0)
NRBC BLD AUTO-RTO: 0 /100 WBCS — SIGNIFICANT CHANGE UP (ref 0–0)
NT-PROBNP SERPL-SCNC: 3501 PG/ML — HIGH (ref 0–300)
OB PNL STL: NEGATIVE — SIGNIFICANT CHANGE UP
PHOSPHATE SERPL-MCNC: 3.4 MG/DL — SIGNIFICANT CHANGE UP (ref 2.4–4.7)
PLATELET # BLD AUTO: 100 K/UL — LOW (ref 150–400)
PLATELET # BLD AUTO: 103 K/UL — LOW (ref 150–400)
PMV BLD: 10.9 FL — SIGNIFICANT CHANGE UP (ref 7–13)
PMV BLD: 11 FL — SIGNIFICANT CHANGE UP (ref 7–13)
POTASSIUM SERPL-MCNC: 4 MMOL/L — SIGNIFICANT CHANGE UP (ref 3.5–5.3)
POTASSIUM SERPL-MCNC: 5.3 MMOL/L — SIGNIFICANT CHANGE UP (ref 3.5–5.3)
POTASSIUM SERPL-SCNC: 4 MMOL/L — SIGNIFICANT CHANGE UP (ref 3.5–5.3)
POTASSIUM SERPL-SCNC: 5.3 MMOL/L — SIGNIFICANT CHANGE UP (ref 3.5–5.3)
PTH-INTACT FLD-MCNC: 258 PG/ML — HIGH (ref 15–65)
RBC # BLD: 2.49 M/UL — LOW (ref 4.2–5.8)
RBC # BLD: 3.07 M/UL — LOW (ref 4.2–5.8)
RBC # FLD: 14.2 % — SIGNIFICANT CHANGE UP (ref 10.3–14.5)
RBC # FLD: 14.4 % — SIGNIFICANT CHANGE UP (ref 10.3–14.5)
SODIUM SERPL-SCNC: 134 MMOL/L — LOW (ref 135–145)
SODIUM SERPL-SCNC: 136 MMOL/L — SIGNIFICANT CHANGE UP (ref 135–145)
T3 SERPL-MCNC: 71 NG/DL — LOW (ref 80–200)
T4 AB SER-ACNC: 5.7 UG/DL — SIGNIFICANT CHANGE UP (ref 4.5–12)
TIBC SERPL-MCNC: 275 UG/DL — SIGNIFICANT CHANGE UP (ref 220–430)
TRANSFERRIN SERPL-MCNC: 192 MG/DL — SIGNIFICANT CHANGE UP (ref 180–329)
TROPONIN T, HIGH SENSITIVITY RESULT: 63 NG/L — HIGH (ref 0–51)
TSH SERPL-MCNC: 2.47 UIU/ML — SIGNIFICANT CHANGE UP (ref 0.27–4.2)
WBC # BLD: 4.47 K/UL — SIGNIFICANT CHANGE UP (ref 3.8–10.5)
WBC # BLD: 5.31 K/UL — SIGNIFICANT CHANGE UP (ref 3.8–10.5)
WBC # FLD AUTO: 4.47 K/UL — SIGNIFICANT CHANGE UP (ref 3.8–10.5)
WBC # FLD AUTO: 5.31 K/UL — SIGNIFICANT CHANGE UP (ref 3.8–10.5)

## 2025-07-31 PROCEDURE — 93010 ELECTROCARDIOGRAM REPORT: CPT

## 2025-07-31 PROCEDURE — 84466 ASSAY OF TRANSFERRIN: CPT

## 2025-07-31 PROCEDURE — 82728 ASSAY OF FERRITIN: CPT

## 2025-07-31 PROCEDURE — 82306 VITAMIN D 25 HYDROXY: CPT

## 2025-07-31 PROCEDURE — 85610 PROTHROMBIN TIME: CPT

## 2025-07-31 PROCEDURE — 84480 ASSAY TRIIODOTHYRONINE (T3): CPT

## 2025-07-31 PROCEDURE — 85025 COMPLETE CBC W/AUTO DIFF WBC: CPT

## 2025-07-31 PROCEDURE — 84295 ASSAY OF SERUM SODIUM: CPT

## 2025-07-31 PROCEDURE — 83880 ASSAY OF NATRIURETIC PEPTIDE: CPT

## 2025-07-31 PROCEDURE — 83970 ASSAY OF PARATHORMONE: CPT

## 2025-07-31 PROCEDURE — 86850 RBC ANTIBODY SCREEN: CPT

## 2025-07-31 PROCEDURE — 99233 SBSQ HOSP IP/OBS HIGH 50: CPT

## 2025-07-31 PROCEDURE — 86901 BLOOD TYPING SEROLOGIC RH(D): CPT

## 2025-07-31 PROCEDURE — 82803 BLOOD GASES ANY COMBINATION: CPT

## 2025-07-31 PROCEDURE — 81001 URINALYSIS AUTO W/SCOPE: CPT

## 2025-07-31 PROCEDURE — 83540 ASSAY OF IRON: CPT

## 2025-07-31 PROCEDURE — 82435 ASSAY OF BLOOD CHLORIDE: CPT

## 2025-07-31 PROCEDURE — 85018 HEMOGLOBIN: CPT

## 2025-07-31 PROCEDURE — 80053 COMPREHEN METABOLIC PANEL: CPT

## 2025-07-31 PROCEDURE — 87086 URINE CULTURE/COLONY COUNT: CPT

## 2025-07-31 PROCEDURE — 85014 HEMATOCRIT: CPT

## 2025-07-31 PROCEDURE — 83735 ASSAY OF MAGNESIUM: CPT

## 2025-07-31 PROCEDURE — 80048 BASIC METABOLIC PNL TOTAL CA: CPT

## 2025-07-31 PROCEDURE — 82962 GLUCOSE BLOOD TEST: CPT

## 2025-07-31 PROCEDURE — 84443 ASSAY THYROID STIM HORMONE: CPT

## 2025-07-31 PROCEDURE — 84132 ASSAY OF SERUM POTASSIUM: CPT

## 2025-07-31 PROCEDURE — 36415 COLL VENOUS BLD VENIPUNCTURE: CPT

## 2025-07-31 PROCEDURE — 82947 ASSAY GLUCOSE BLOOD QUANT: CPT

## 2025-07-31 PROCEDURE — 84484 ASSAY OF TROPONIN QUANT: CPT

## 2025-07-31 PROCEDURE — 82272 OCCULT BLD FECES 1-3 TESTS: CPT

## 2025-07-31 PROCEDURE — 87637 SARSCOV2&INF A&B&RSV AMP PRB: CPT

## 2025-07-31 PROCEDURE — 83605 ASSAY OF LACTIC ACID: CPT

## 2025-07-31 PROCEDURE — 94640 AIRWAY INHALATION TREATMENT: CPT

## 2025-07-31 PROCEDURE — 82330 ASSAY OF CALCIUM: CPT

## 2025-07-31 PROCEDURE — 87040 BLOOD CULTURE FOR BACTERIA: CPT

## 2025-07-31 PROCEDURE — 85730 THROMBOPLASTIN TIME PARTIAL: CPT

## 2025-07-31 PROCEDURE — 83550 IRON BINDING TEST: CPT

## 2025-07-31 PROCEDURE — 82310 ASSAY OF CALCIUM: CPT

## 2025-07-31 PROCEDURE — 93005 ELECTROCARDIOGRAM TRACING: CPT

## 2025-07-31 PROCEDURE — 71045 X-RAY EXAM CHEST 1 VIEW: CPT

## 2025-07-31 PROCEDURE — 84436 ASSAY OF TOTAL THYROXINE: CPT

## 2025-07-31 PROCEDURE — 86900 BLOOD TYPING SEROLOGIC ABO: CPT

## 2025-07-31 PROCEDURE — 85027 COMPLETE CBC AUTOMATED: CPT

## 2025-07-31 PROCEDURE — 84100 ASSAY OF PHOSPHORUS: CPT

## 2025-07-31 RX ORDER — IRON SUCROSE 20 MG/ML
200 INJECTION, SOLUTION INTRAVENOUS EVERY 24 HOURS
Refills: 0 | Status: COMPLETED | OUTPATIENT
Start: 2025-07-31 | End: 2025-08-03

## 2025-07-31 RX ORDER — MELATONIN 5 MG
3 TABLET ORAL AT BEDTIME
Refills: 0 | Status: DISCONTINUED | OUTPATIENT
Start: 2025-07-31 | End: 2025-08-05

## 2025-07-31 RX ORDER — ONDANSETRON HCL/PF 4 MG/2 ML
4 VIAL (ML) INJECTION EVERY 8 HOURS
Refills: 0 | Status: DISCONTINUED | OUTPATIENT
Start: 2025-07-31 | End: 2025-08-05

## 2025-07-31 RX ORDER — CEFTRIAXONE 500 MG/1
1000 INJECTION, POWDER, FOR SOLUTION INTRAMUSCULAR; INTRAVENOUS EVERY 24 HOURS
Refills: 0 | Status: COMPLETED | OUTPATIENT
Start: 2025-07-31 | End: 2025-08-04

## 2025-07-31 RX ORDER — AZITHROMYCIN 250 MG
500 CAPSULE ORAL EVERY 24 HOURS
Refills: 0 | Status: DISCONTINUED | OUTPATIENT
Start: 2025-07-31 | End: 2025-08-05

## 2025-07-31 RX ORDER — ESOMEPRAZOLE MAGNESIUM 40 MG/1
1 CAPSULE, DELAYED RELEASE ORAL
Refills: 0 | DISCHARGE

## 2025-07-31 RX ORDER — TAMSULOSIN HYDROCHLORIDE 0.4 MG/1
0.8 CAPSULE ORAL AT BEDTIME
Refills: 0 | Status: DISCONTINUED | OUTPATIENT
Start: 2025-07-31 | End: 2025-08-02

## 2025-07-31 RX ORDER — MAGNESIUM, ALUMINUM HYDROXIDE 200-200 MG
30 TABLET,CHEWABLE ORAL EVERY 4 HOURS
Refills: 0 | Status: DISCONTINUED | OUTPATIENT
Start: 2025-07-31 | End: 2025-08-05

## 2025-07-31 RX ORDER — SEVELAMER HYDROCHLORIDE 800 MG/1
1 TABLET ORAL
Refills: 0 | DISCHARGE

## 2025-07-31 RX ORDER — SEVELAMER HYDROCHLORIDE 800 MG/1
800 TABLET ORAL EVERY 8 HOURS
Refills: 0 | Status: DISCONTINUED | OUTPATIENT
Start: 2025-07-31 | End: 2025-08-05

## 2025-07-31 RX ORDER — ACETAMINOPHEN 500 MG/5ML
650 LIQUID (ML) ORAL EVERY 6 HOURS
Refills: 0 | Status: DISCONTINUED | OUTPATIENT
Start: 2025-07-31 | End: 2025-08-05

## 2025-07-31 RX ORDER — HEPARIN SODIUM 1000 [USP'U]/ML
5000 INJECTION INTRAVENOUS; SUBCUTANEOUS EVERY 8 HOURS
Refills: 0 | Status: DISCONTINUED | OUTPATIENT
Start: 2025-07-31 | End: 2025-08-02

## 2025-07-31 RX ORDER — NEBIVOLOL 2.5 MG/1
2.5 TABLET ORAL DAILY
Refills: 0 | Status: DISCONTINUED | OUTPATIENT
Start: 2025-07-31 | End: 2025-08-05

## 2025-07-31 RX ORDER — SODIUM BICARBONATE 1 MEQ/ML
650 SYRINGE (ML) INTRAVENOUS EVERY 8 HOURS
Refills: 0 | Status: DISCONTINUED | OUTPATIENT
Start: 2025-07-31 | End: 2025-08-05

## 2025-07-31 RX ORDER — EPOETIN ALFA 10000 [IU]/ML
10000 SOLUTION INTRAVENOUS; SUBCUTANEOUS ONCE
Refills: 0 | Status: COMPLETED | OUTPATIENT
Start: 2025-07-31 | End: 2025-07-31

## 2025-07-31 RX ADMIN — Medication 650 MILLIGRAM(S): at 05:56

## 2025-07-31 RX ADMIN — Medication 650 MILLIGRAM(S): at 13:15

## 2025-07-31 RX ADMIN — EPOETIN ALFA 20000 UNIT(S): 10000 SOLUTION INTRAVENOUS; SUBCUTANEOUS at 01:34

## 2025-07-31 RX ADMIN — CEFTRIAXONE 1000 MILLIGRAM(S): 500 INJECTION, POWDER, FOR SOLUTION INTRAMUSCULAR; INTRAVENOUS at 05:56

## 2025-07-31 RX ADMIN — NEBIVOLOL 2.5 MILLIGRAM(S): 2.5 TABLET ORAL at 05:55

## 2025-07-31 RX ADMIN — TAMSULOSIN HYDROCHLORIDE 0.8 MILLIGRAM(S): 0.4 CAPSULE ORAL at 21:25

## 2025-07-31 RX ADMIN — Medication 40 MILLIGRAM(S): at 05:56

## 2025-07-31 RX ADMIN — HEPARIN SODIUM 5000 UNIT(S): 1000 INJECTION INTRAVENOUS; SUBCUTANEOUS at 13:15

## 2025-07-31 RX ADMIN — HEPARIN SODIUM 5000 UNIT(S): 1000 INJECTION INTRAVENOUS; SUBCUTANEOUS at 21:26

## 2025-07-31 RX ADMIN — SEVELAMER HYDROCHLORIDE 800 MILLIGRAM(S): 800 TABLET ORAL at 05:56

## 2025-07-31 RX ADMIN — Medication 250 MILLIGRAM(S): at 05:56

## 2025-07-31 RX ADMIN — IRON SUCROSE 110 MILLIGRAM(S): 20 INJECTION, SOLUTION INTRAVENOUS at 14:16

## 2025-07-31 RX ADMIN — SODIUM ZIRCONIUM CYCLOSILICATE 10 GRAM(S): 5 POWDER, FOR SUSPENSION ORAL at 13:15

## 2025-07-31 RX ADMIN — Medication 1 DOSE(S): at 09:00

## 2025-07-31 RX ADMIN — Medication 1 APPLICATION(S): at 18:58

## 2025-07-31 RX ADMIN — Medication 650 MILLIGRAM(S): at 21:25

## 2025-07-31 RX ADMIN — SEVELAMER HYDROCHLORIDE 800 MILLIGRAM(S): 800 TABLET ORAL at 21:25

## 2025-07-31 RX ADMIN — HEPARIN SODIUM 5000 UNIT(S): 1000 INJECTION INTRAVENOUS; SUBCUTANEOUS at 05:56

## 2025-07-31 RX ADMIN — EPOETIN ALFA 10000 UNIT(S): 10000 SOLUTION INTRAVENOUS; SUBCUTANEOUS at 18:24

## 2025-07-31 RX ADMIN — SEVELAMER HYDROCHLORIDE 800 MILLIGRAM(S): 800 TABLET ORAL at 13:15

## 2025-08-01 LAB
ALBUMIN SERPL ELPH-MCNC: 3.2 G/DL — LOW (ref 3.3–5.2)
ALP SERPL-CCNC: 87 U/L — SIGNIFICANT CHANGE UP (ref 40–120)
ALT FLD-CCNC: 13 U/L — SIGNIFICANT CHANGE UP
ANION GAP SERPL CALC-SCNC: 16 MMOL/L — SIGNIFICANT CHANGE UP (ref 5–17)
AST SERPL-CCNC: 13 U/L — SIGNIFICANT CHANGE UP
BILIRUB SERPL-MCNC: 0.7 MG/DL — SIGNIFICANT CHANGE UP (ref 0.4–2)
BUN SERPL-MCNC: 60.5 MG/DL — HIGH (ref 8–20)
CALCIUM SERPL-MCNC: 8.8 MG/DL — SIGNIFICANT CHANGE UP (ref 8.4–10.5)
CHLORIDE SERPL-SCNC: 103 MMOL/L — SIGNIFICANT CHANGE UP (ref 96–108)
CO2 SERPL-SCNC: 19 MMOL/L — LOW (ref 22–29)
CREAT SERPL-MCNC: 10.21 MG/DL — HIGH (ref 0.5–1.3)
EGFR: 4 ML/MIN/1.73M2 — LOW
EGFR: 4 ML/MIN/1.73M2 — LOW
GLUCOSE SERPL-MCNC: 99 MG/DL — SIGNIFICANT CHANGE UP (ref 70–99)
HBV CORE AB SER-ACNC: SIGNIFICANT CHANGE UP
HBV CORE IGM SER-ACNC: SIGNIFICANT CHANGE UP
HBV SURFACE AB SER-ACNC: <3.3 MIU/ML — LOW
HBV SURFACE AG SER-ACNC: SIGNIFICANT CHANGE UP
HCT VFR BLD CALC: 26 % — LOW (ref 39–50)
HCV AB S/CO SERPL IA: 0.19 S/CO — SIGNIFICANT CHANGE UP (ref 0–0.79)
HCV AB SERPL-IMP: SIGNIFICANT CHANGE UP
HGB BLD-MCNC: 8.4 G/DL — LOW (ref 13–17)
IMMATURE PLATELET FRACTION #: 1.6 K/UL — LOW (ref 3.9–12.5)
IMMATURE PLATELET FRACTION %: 1.8 % — SIGNIFICANT CHANGE UP (ref 1.6–7.1)
LACTATE SERPL-SCNC: 1.4 MMOL/L — SIGNIFICANT CHANGE UP (ref 0.5–2)
MAGNESIUM SERPL-MCNC: 1.7 MG/DL — SIGNIFICANT CHANGE UP (ref 1.6–2.6)
MCHC RBC-ENTMCNC: 31.2 PG — SIGNIFICANT CHANGE UP (ref 27–34)
MCHC RBC-ENTMCNC: 32.3 G/DL — SIGNIFICANT CHANGE UP (ref 32–36)
MCV RBC AUTO: 96.7 FL — SIGNIFICANT CHANGE UP (ref 80–100)
MRSA PCR RESULT.: SIGNIFICANT CHANGE UP
NRBC # BLD AUTO: 0 K/UL — SIGNIFICANT CHANGE UP (ref 0–0)
NRBC # FLD: 0 K/UL — SIGNIFICANT CHANGE UP (ref 0–0)
NRBC BLD AUTO-RTO: 0 /100 WBCS — SIGNIFICANT CHANGE UP (ref 0–0)
PHOSPHATE SERPL-MCNC: 5 MG/DL — HIGH (ref 2.4–4.7)
PLATELET # BLD AUTO: 90 K/UL — LOW (ref 150–400)
PMV BLD: 11 FL — SIGNIFICANT CHANGE UP (ref 7–13)
POTASSIUM SERPL-MCNC: 4.1 MMOL/L — SIGNIFICANT CHANGE UP (ref 3.5–5.3)
POTASSIUM SERPL-SCNC: 4.1 MMOL/L — SIGNIFICANT CHANGE UP (ref 3.5–5.3)
PROT SERPL-MCNC: 5.5 G/DL — LOW (ref 6.6–8.7)
RBC # BLD: 2.69 M/UL — LOW (ref 4.2–5.8)
RBC # FLD: 14.4 % — SIGNIFICANT CHANGE UP (ref 10.3–14.5)
S AUREUS DNA NOSE QL NAA+PROBE: SIGNIFICANT CHANGE UP
SODIUM SERPL-SCNC: 138 MMOL/L — SIGNIFICANT CHANGE UP (ref 135–145)
T4 FREE SERPL-MCNC: 1.3 NG/DL — SIGNIFICANT CHANGE UP (ref 0.9–1.8)
WBC # BLD: 3.9 K/UL — SIGNIFICANT CHANGE UP (ref 3.8–10.5)
WBC # FLD AUTO: 3.9 K/UL — SIGNIFICANT CHANGE UP (ref 3.8–10.5)

## 2025-08-01 PROCEDURE — 83605 ASSAY OF LACTIC ACID: CPT

## 2025-08-01 PROCEDURE — 87040 BLOOD CULTURE FOR BACTERIA: CPT

## 2025-08-01 PROCEDURE — 82306 VITAMIN D 25 HYDROXY: CPT

## 2025-08-01 PROCEDURE — 82728 ASSAY OF FERRITIN: CPT

## 2025-08-01 PROCEDURE — 87340 HEPATITIS B SURFACE AG IA: CPT

## 2025-08-01 PROCEDURE — 80048 BASIC METABOLIC PNL TOTAL CA: CPT

## 2025-08-01 PROCEDURE — 83970 ASSAY OF PARATHORMONE: CPT

## 2025-08-01 PROCEDURE — 86706 HEP B SURFACE ANTIBODY: CPT

## 2025-08-01 PROCEDURE — 93005 ELECTROCARDIOGRAM TRACING: CPT

## 2025-08-01 PROCEDURE — 82947 ASSAY GLUCOSE BLOOD QUANT: CPT

## 2025-08-01 PROCEDURE — P9047: CPT

## 2025-08-01 PROCEDURE — 84436 ASSAY OF TOTAL THYROXINE: CPT

## 2025-08-01 PROCEDURE — 36415 COLL VENOUS BLD VENIPUNCTURE: CPT

## 2025-08-01 PROCEDURE — 85014 HEMATOCRIT: CPT

## 2025-08-01 PROCEDURE — 80053 COMPREHEN METABOLIC PANEL: CPT

## 2025-08-01 PROCEDURE — 99222 1ST HOSP IP/OBS MODERATE 55: CPT

## 2025-08-01 PROCEDURE — 86705 HEP B CORE ANTIBODY IGM: CPT

## 2025-08-01 PROCEDURE — 82272 OCCULT BLD FECES 1-3 TESTS: CPT

## 2025-08-01 PROCEDURE — 87640 STAPH A DNA AMP PROBE: CPT

## 2025-08-01 PROCEDURE — 84443 ASSAY THYROID STIM HORMONE: CPT

## 2025-08-01 PROCEDURE — 84439 ASSAY OF FREE THYROXINE: CPT

## 2025-08-01 PROCEDURE — 84466 ASSAY OF TRANSFERRIN: CPT

## 2025-08-01 PROCEDURE — 86900 BLOOD TYPING SEROLOGIC ABO: CPT

## 2025-08-01 PROCEDURE — 85025 COMPLETE CBC W/AUTO DIFF WBC: CPT

## 2025-08-01 PROCEDURE — 82310 ASSAY OF CALCIUM: CPT

## 2025-08-01 PROCEDURE — 87086 URINE CULTURE/COLONY COUNT: CPT

## 2025-08-01 PROCEDURE — 82330 ASSAY OF CALCIUM: CPT

## 2025-08-01 PROCEDURE — 85027 COMPLETE CBC AUTOMATED: CPT

## 2025-08-01 PROCEDURE — 99233 SBSQ HOSP IP/OBS HIGH 50: CPT

## 2025-08-01 PROCEDURE — 85610 PROTHROMBIN TIME: CPT

## 2025-08-01 PROCEDURE — 87641 MR-STAPH DNA AMP PROBE: CPT

## 2025-08-01 PROCEDURE — 85018 HEMOGLOBIN: CPT

## 2025-08-01 PROCEDURE — 82962 GLUCOSE BLOOD TEST: CPT

## 2025-08-01 PROCEDURE — 84100 ASSAY OF PHOSPHORUS: CPT

## 2025-08-01 PROCEDURE — 82435 ASSAY OF BLOOD CHLORIDE: CPT

## 2025-08-01 PROCEDURE — 86850 RBC ANTIBODY SCREEN: CPT

## 2025-08-01 PROCEDURE — 87637 SARSCOV2&INF A&B&RSV AMP PRB: CPT

## 2025-08-01 PROCEDURE — 86704 HEP B CORE ANTIBODY TOTAL: CPT

## 2025-08-01 PROCEDURE — 83880 ASSAY OF NATRIURETIC PEPTIDE: CPT

## 2025-08-01 PROCEDURE — 71045 X-RAY EXAM CHEST 1 VIEW: CPT

## 2025-08-01 PROCEDURE — 94640 AIRWAY INHALATION TREATMENT: CPT

## 2025-08-01 PROCEDURE — 83550 IRON BINDING TEST: CPT

## 2025-08-01 PROCEDURE — 83735 ASSAY OF MAGNESIUM: CPT

## 2025-08-01 PROCEDURE — 85730 THROMBOPLASTIN TIME PARTIAL: CPT

## 2025-08-01 PROCEDURE — 84132 ASSAY OF SERUM POTASSIUM: CPT

## 2025-08-01 PROCEDURE — 93990 DOPPLER FLOW TESTING: CPT

## 2025-08-01 PROCEDURE — 93990 DOPPLER FLOW TESTING: CPT | Mod: 26

## 2025-08-01 PROCEDURE — 82803 BLOOD GASES ANY COMBINATION: CPT

## 2025-08-01 PROCEDURE — 84484 ASSAY OF TROPONIN QUANT: CPT

## 2025-08-01 PROCEDURE — 86803 HEPATITIS C AB TEST: CPT

## 2025-08-01 PROCEDURE — 84295 ASSAY OF SERUM SODIUM: CPT

## 2025-08-01 PROCEDURE — 84480 ASSAY TRIIODOTHYRONINE (T3): CPT

## 2025-08-01 PROCEDURE — 86901 BLOOD TYPING SEROLOGIC RH(D): CPT

## 2025-08-01 PROCEDURE — 81001 URINALYSIS AUTO W/SCOPE: CPT

## 2025-08-01 PROCEDURE — 83540 ASSAY OF IRON: CPT

## 2025-08-01 RX ORDER — ALBUMIN (HUMAN) 12.5 G/50ML
50 INJECTION, SOLUTION INTRAVENOUS ONCE
Refills: 0 | Status: COMPLETED | OUTPATIENT
Start: 2025-08-01 | End: 2025-08-01

## 2025-08-01 RX ORDER — CALCITRIOL 0.5 UG/1
0.25 CAPSULE, GELATIN COATED ORAL DAILY
Refills: 0 | Status: DISCONTINUED | OUTPATIENT
Start: 2025-08-01 | End: 2025-08-05

## 2025-08-01 RX ORDER — MAGNESIUM SULFATE 500 MG/ML
2 SYRINGE (ML) INJECTION ONCE
Refills: 0 | Status: COMPLETED | OUTPATIENT
Start: 2025-08-01 | End: 2025-08-01

## 2025-08-01 RX ORDER — MIDODRINE HYDROCHLORIDE 5 MG/1
5 TABLET ORAL ONCE
Refills: 0 | Status: COMPLETED | OUTPATIENT
Start: 2025-08-01 | End: 2025-08-01

## 2025-08-01 RX ADMIN — Medication 1 DOSE(S): at 21:27

## 2025-08-01 RX ADMIN — SEVELAMER HYDROCHLORIDE 800 MILLIGRAM(S): 800 TABLET ORAL at 15:49

## 2025-08-01 RX ADMIN — MIDODRINE HYDROCHLORIDE 5 MILLIGRAM(S): 5 TABLET ORAL at 11:05

## 2025-08-01 RX ADMIN — HEPARIN SODIUM 5000 UNIT(S): 1000 INJECTION INTRAVENOUS; SUBCUTANEOUS at 05:22

## 2025-08-01 RX ADMIN — Medication 40 MILLIGRAM(S): at 05:23

## 2025-08-01 RX ADMIN — Medication 1 APPLICATION(S): at 05:22

## 2025-08-01 RX ADMIN — Medication 650 MILLIGRAM(S): at 21:19

## 2025-08-01 RX ADMIN — Medication 250 MILLIGRAM(S): at 05:21

## 2025-08-01 RX ADMIN — CEFTRIAXONE 1000 MILLIGRAM(S): 500 INJECTION, POWDER, FOR SOLUTION INTRAMUSCULAR; INTRAVENOUS at 05:21

## 2025-08-01 RX ADMIN — HEPARIN SODIUM 5000 UNIT(S): 1000 INJECTION INTRAVENOUS; SUBCUTANEOUS at 15:50

## 2025-08-01 RX ADMIN — Medication 650 MILLIGRAM(S): at 15:49

## 2025-08-01 RX ADMIN — Medication 500 MILLILITER(S): at 04:36

## 2025-08-01 RX ADMIN — Medication 1 DOSE(S): at 08:49

## 2025-08-01 RX ADMIN — TAMSULOSIN HYDROCHLORIDE 0.8 MILLIGRAM(S): 0.4 CAPSULE ORAL at 21:19

## 2025-08-01 RX ADMIN — SEVELAMER HYDROCHLORIDE 800 MILLIGRAM(S): 800 TABLET ORAL at 05:22

## 2025-08-01 RX ADMIN — Medication 250 MILLILITER(S): at 02:35

## 2025-08-01 RX ADMIN — ALBUMIN (HUMAN) 50 MILLILITER(S): 12.5 INJECTION, SOLUTION INTRAVENOUS at 04:36

## 2025-08-01 RX ADMIN — IRON SUCROSE 110 MILLIGRAM(S): 20 INJECTION, SOLUTION INTRAVENOUS at 15:50

## 2025-08-01 RX ADMIN — Medication 25 GRAM(S): at 06:56

## 2025-08-01 RX ADMIN — Medication 650 MILLIGRAM(S): at 05:22

## 2025-08-01 RX ADMIN — HEPARIN SODIUM 5000 UNIT(S): 1000 INJECTION INTRAVENOUS; SUBCUTANEOUS at 21:19

## 2025-08-01 RX ADMIN — SEVELAMER HYDROCHLORIDE 800 MILLIGRAM(S): 800 TABLET ORAL at 21:19

## 2025-08-02 ENCOUNTER — RESULT REVIEW (OUTPATIENT)
Age: 89
End: 2025-08-02

## 2025-08-02 LAB
ALBUMIN SERPL ELPH-MCNC: 3.3 G/DL — SIGNIFICANT CHANGE UP (ref 3.3–5.2)
ALP SERPL-CCNC: 80 U/L — SIGNIFICANT CHANGE UP (ref 40–120)
ALT FLD-CCNC: 10 U/L — SIGNIFICANT CHANGE UP
ANION GAP SERPL CALC-SCNC: 16 MMOL/L — SIGNIFICANT CHANGE UP (ref 5–17)
AST SERPL-CCNC: 13 U/L — SIGNIFICANT CHANGE UP
BILIRUB SERPL-MCNC: 0.9 MG/DL — SIGNIFICANT CHANGE UP (ref 0.4–2)
BUN SERPL-MCNC: 37.9 MG/DL — HIGH (ref 8–20)
CALCIUM SERPL-MCNC: 9.1 MG/DL — SIGNIFICANT CHANGE UP (ref 8.4–10.5)
CHLORIDE SERPL-SCNC: 99 MMOL/L — SIGNIFICANT CHANGE UP (ref 96–108)
CO2 SERPL-SCNC: 23 MMOL/L — SIGNIFICANT CHANGE UP (ref 22–29)
CREAT SERPL-MCNC: 7.52 MG/DL — HIGH (ref 0.5–1.3)
EGFR: 6 ML/MIN/1.73M2 — LOW
EGFR: 6 ML/MIN/1.73M2 — LOW
GLUCOSE SERPL-MCNC: 114 MG/DL — HIGH (ref 70–99)
HCT VFR BLD CALC: 25.3 % — LOW (ref 39–50)
HGB BLD-MCNC: 8.1 G/DL — LOW (ref 13–17)
IMMATURE PLATELET FRACTION #: 2.4 K/UL — LOW (ref 3.9–12.5)
IMMATURE PLATELET FRACTION %: 2.8 % — SIGNIFICANT CHANGE UP (ref 1.6–7.1)
MCHC RBC-ENTMCNC: 30.9 PG — SIGNIFICANT CHANGE UP (ref 27–34)
MCHC RBC-ENTMCNC: 32 G/DL — SIGNIFICANT CHANGE UP (ref 32–36)
MCV RBC AUTO: 96.6 FL — SIGNIFICANT CHANGE UP (ref 80–100)
NRBC # BLD AUTO: 0 K/UL — SIGNIFICANT CHANGE UP (ref 0–0)
NRBC # FLD: 0 K/UL — SIGNIFICANT CHANGE UP (ref 0–0)
NRBC BLD AUTO-RTO: 0 /100 WBCS — SIGNIFICANT CHANGE UP (ref 0–0)
PLATELET # BLD AUTO: 86 K/UL — LOW (ref 150–400)
PMV BLD: 10.8 FL — SIGNIFICANT CHANGE UP (ref 7–13)
POTASSIUM SERPL-MCNC: 4.2 MMOL/L — SIGNIFICANT CHANGE UP (ref 3.5–5.3)
POTASSIUM SERPL-SCNC: 4.2 MMOL/L — SIGNIFICANT CHANGE UP (ref 3.5–5.3)
PROT SERPL-MCNC: 5.7 G/DL — LOW (ref 6.6–8.7)
RBC # BLD: 2.62 M/UL — LOW (ref 4.2–5.8)
RBC # FLD: 14.5 % — SIGNIFICANT CHANGE UP (ref 10.3–14.5)
SODIUM SERPL-SCNC: 138 MMOL/L — SIGNIFICANT CHANGE UP (ref 135–145)
WBC # BLD: 4.9 K/UL — SIGNIFICANT CHANGE UP (ref 3.8–10.5)
WBC # FLD AUTO: 4.9 K/UL — SIGNIFICANT CHANGE UP (ref 3.8–10.5)

## 2025-08-02 PROCEDURE — 82947 ASSAY GLUCOSE BLOOD QUANT: CPT

## 2025-08-02 PROCEDURE — 87086 URINE CULTURE/COLONY COUNT: CPT

## 2025-08-02 PROCEDURE — 87637 SARSCOV2&INF A&B&RSV AMP PRB: CPT

## 2025-08-02 PROCEDURE — 83540 ASSAY OF IRON: CPT

## 2025-08-02 PROCEDURE — 81001 URINALYSIS AUTO W/SCOPE: CPT

## 2025-08-02 PROCEDURE — 87340 HEPATITIS B SURFACE AG IA: CPT

## 2025-08-02 PROCEDURE — 87640 STAPH A DNA AMP PROBE: CPT

## 2025-08-02 PROCEDURE — 94640 AIRWAY INHALATION TREATMENT: CPT

## 2025-08-02 PROCEDURE — 85730 THROMBOPLASTIN TIME PARTIAL: CPT

## 2025-08-02 PROCEDURE — 84480 ASSAY TRIIODOTHYRONINE (T3): CPT

## 2025-08-02 PROCEDURE — 71045 X-RAY EXAM CHEST 1 VIEW: CPT

## 2025-08-02 PROCEDURE — 87641 MR-STAPH DNA AMP PROBE: CPT

## 2025-08-02 PROCEDURE — 85014 HEMATOCRIT: CPT

## 2025-08-02 PROCEDURE — 86706 HEP B SURFACE ANTIBODY: CPT

## 2025-08-02 PROCEDURE — 87077 CULTURE AEROBIC IDENTIFY: CPT

## 2025-08-02 PROCEDURE — 85027 COMPLETE CBC AUTOMATED: CPT

## 2025-08-02 PROCEDURE — 86704 HEP B CORE ANTIBODY TOTAL: CPT

## 2025-08-02 PROCEDURE — 87040 BLOOD CULTURE FOR BACTERIA: CPT

## 2025-08-02 PROCEDURE — 93010 ELECTROCARDIOGRAM REPORT: CPT

## 2025-08-02 PROCEDURE — 84439 ASSAY OF FREE THYROXINE: CPT

## 2025-08-02 PROCEDURE — 84295 ASSAY OF SERUM SODIUM: CPT

## 2025-08-02 PROCEDURE — 82306 VITAMIN D 25 HYDROXY: CPT

## 2025-08-02 PROCEDURE — 83550 IRON BINDING TEST: CPT

## 2025-08-02 PROCEDURE — 86901 BLOOD TYPING SEROLOGIC RH(D): CPT

## 2025-08-02 PROCEDURE — 84132 ASSAY OF SERUM POTASSIUM: CPT

## 2025-08-02 PROCEDURE — 86705 HEP B CORE ANTIBODY IGM: CPT

## 2025-08-02 PROCEDURE — 85610 PROTHROMBIN TIME: CPT

## 2025-08-02 PROCEDURE — 82330 ASSAY OF CALCIUM: CPT

## 2025-08-02 PROCEDURE — 82728 ASSAY OF FERRITIN: CPT

## 2025-08-02 PROCEDURE — 93990 DOPPLER FLOW TESTING: CPT

## 2025-08-02 PROCEDURE — 82962 GLUCOSE BLOOD TEST: CPT

## 2025-08-02 PROCEDURE — 86850 RBC ANTIBODY SCREEN: CPT

## 2025-08-02 PROCEDURE — 82803 BLOOD GASES ANY COMBINATION: CPT

## 2025-08-02 PROCEDURE — 99222 1ST HOSP IP/OBS MODERATE 55: CPT | Mod: GC

## 2025-08-02 PROCEDURE — 80048 BASIC METABOLIC PNL TOTAL CA: CPT

## 2025-08-02 PROCEDURE — 82272 OCCULT BLD FECES 1-3 TESTS: CPT

## 2025-08-02 PROCEDURE — 93005 ELECTROCARDIOGRAM TRACING: CPT

## 2025-08-02 PROCEDURE — 83605 ASSAY OF LACTIC ACID: CPT

## 2025-08-02 PROCEDURE — 86803 HEPATITIS C AB TEST: CPT

## 2025-08-02 PROCEDURE — 84436 ASSAY OF TOTAL THYROXINE: CPT

## 2025-08-02 PROCEDURE — 83735 ASSAY OF MAGNESIUM: CPT

## 2025-08-02 PROCEDURE — 99233 SBSQ HOSP IP/OBS HIGH 50: CPT

## 2025-08-02 PROCEDURE — 36415 COLL VENOUS BLD VENIPUNCTURE: CPT

## 2025-08-02 PROCEDURE — 80053 COMPREHEN METABOLIC PANEL: CPT

## 2025-08-02 PROCEDURE — 82310 ASSAY OF CALCIUM: CPT

## 2025-08-02 PROCEDURE — P9047: CPT

## 2025-08-02 PROCEDURE — 84443 ASSAY THYROID STIM HORMONE: CPT

## 2025-08-02 PROCEDURE — 85025 COMPLETE CBC W/AUTO DIFF WBC: CPT

## 2025-08-02 PROCEDURE — 83970 ASSAY OF PARATHORMONE: CPT

## 2025-08-02 PROCEDURE — 84100 ASSAY OF PHOSPHORUS: CPT

## 2025-08-02 PROCEDURE — 86900 BLOOD TYPING SEROLOGIC ABO: CPT

## 2025-08-02 PROCEDURE — 85018 HEMOGLOBIN: CPT

## 2025-08-02 PROCEDURE — 84466 ASSAY OF TRANSFERRIN: CPT

## 2025-08-02 PROCEDURE — 99232 SBSQ HOSP IP/OBS MODERATE 35: CPT

## 2025-08-02 PROCEDURE — 82435 ASSAY OF BLOOD CHLORIDE: CPT

## 2025-08-02 PROCEDURE — 83880 ASSAY OF NATRIURETIC PEPTIDE: CPT

## 2025-08-02 PROCEDURE — 84484 ASSAY OF TROPONIN QUANT: CPT

## 2025-08-02 PROCEDURE — 93306 TTE W/DOPPLER COMPLETE: CPT | Mod: 26

## 2025-08-02 RX ORDER — MIDODRINE HYDROCHLORIDE 5 MG/1
5 TABLET ORAL THREE TIMES A DAY
Refills: 0 | Status: DISCONTINUED | OUTPATIENT
Start: 2025-08-02 | End: 2025-08-05

## 2025-08-02 RX ORDER — ALBUMIN (HUMAN) 12.5 G/50ML
100 INJECTION, SOLUTION INTRAVENOUS ONCE
Refills: 0 | Status: COMPLETED | OUTPATIENT
Start: 2025-08-02 | End: 2025-08-02

## 2025-08-02 RX ORDER — HEPARIN SODIUM 1000 [USP'U]/ML
5000 INJECTION INTRAVENOUS; SUBCUTANEOUS EVERY 12 HOURS
Refills: 0 | Status: DISCONTINUED | OUTPATIENT
Start: 2025-08-02 | End: 2025-08-02

## 2025-08-02 RX ORDER — TAMSULOSIN HYDROCHLORIDE 0.4 MG/1
0.4 CAPSULE ORAL AT BEDTIME
Refills: 0 | Status: DISCONTINUED | OUTPATIENT
Start: 2025-08-02 | End: 2025-08-05

## 2025-08-02 RX ADMIN — Medication 650 MILLIGRAM(S): at 21:33

## 2025-08-02 RX ADMIN — CEFTRIAXONE 1000 MILLIGRAM(S): 500 INJECTION, POWDER, FOR SOLUTION INTRAMUSCULAR; INTRAVENOUS at 06:14

## 2025-08-02 RX ADMIN — SEVELAMER HYDROCHLORIDE 800 MILLIGRAM(S): 800 TABLET ORAL at 06:14

## 2025-08-02 RX ADMIN — Medication 650 MILLIGRAM(S): at 06:14

## 2025-08-02 RX ADMIN — Medication 1 APPLICATION(S): at 06:14

## 2025-08-02 RX ADMIN — Medication 1 DOSE(S): at 09:08

## 2025-08-02 RX ADMIN — SEVELAMER HYDROCHLORIDE 800 MILLIGRAM(S): 800 TABLET ORAL at 12:56

## 2025-08-02 RX ADMIN — NEBIVOLOL 2.5 MILLIGRAM(S): 2.5 TABLET ORAL at 06:26

## 2025-08-02 RX ADMIN — ALBUMIN (HUMAN) 50 MILLILITER(S): 12.5 INJECTION, SOLUTION INTRAVENOUS at 02:31

## 2025-08-02 RX ADMIN — Medication 650 MILLIGRAM(S): at 12:56

## 2025-08-02 RX ADMIN — IRON SUCROSE 110 MILLIGRAM(S): 20 INJECTION, SOLUTION INTRAVENOUS at 12:56

## 2025-08-02 RX ADMIN — TAMSULOSIN HYDROCHLORIDE 0.4 MILLIGRAM(S): 0.4 CAPSULE ORAL at 21:33

## 2025-08-02 RX ADMIN — HEPARIN SODIUM 5000 UNIT(S): 1000 INJECTION INTRAVENOUS; SUBCUTANEOUS at 06:14

## 2025-08-02 RX ADMIN — MIDODRINE HYDROCHLORIDE 5 MILLIGRAM(S): 5 TABLET ORAL at 17:15

## 2025-08-02 RX ADMIN — Medication 40 MILLIGRAM(S): at 06:14

## 2025-08-02 RX ADMIN — CALCITRIOL 0.25 MICROGRAM(S): 0.5 CAPSULE, GELATIN COATED ORAL at 12:56

## 2025-08-02 RX ADMIN — Medication 250 MILLIGRAM(S): at 06:14

## 2025-08-02 RX ADMIN — MIDODRINE HYDROCHLORIDE 5 MILLIGRAM(S): 5 TABLET ORAL at 12:56

## 2025-08-02 RX ADMIN — SEVELAMER HYDROCHLORIDE 800 MILLIGRAM(S): 800 TABLET ORAL at 21:33

## 2025-08-03 LAB
-  AMOXICILLIN/CLAVULANIC ACID: SIGNIFICANT CHANGE UP
-  AMPICILLIN/SULBACTAM: SIGNIFICANT CHANGE UP
-  AMPICILLIN: SIGNIFICANT CHANGE UP
-  AZTREONAM: SIGNIFICANT CHANGE UP
-  CEFAZOLIN: SIGNIFICANT CHANGE UP
-  CEFEPIME: SIGNIFICANT CHANGE UP
-  CEFOXITIN: SIGNIFICANT CHANGE UP
-  CEFTRIAXONE: SIGNIFICANT CHANGE UP
-  CEFUROXIME: SIGNIFICANT CHANGE UP
-  CIPROFLOXACIN: SIGNIFICANT CHANGE UP
-  ERTAPENEM: SIGNIFICANT CHANGE UP
-  GENTAMICIN: SIGNIFICANT CHANGE UP
-  LEVOFLOXACIN: SIGNIFICANT CHANGE UP
-  MEROPENEM: SIGNIFICANT CHANGE UP
-  NITROFURANTOIN: SIGNIFICANT CHANGE UP
-  PIPERACILLIN/TAZOBACTAM: SIGNIFICANT CHANGE UP
-  TOBRAMYCIN: SIGNIFICANT CHANGE UP
-  TRIMETHOPRIM/SULFAMETHOXAZOLE: SIGNIFICANT CHANGE UP
ANION GAP SERPL CALC-SCNC: 17 MMOL/L — SIGNIFICANT CHANGE UP (ref 5–17)
ANISOCYTOSIS BLD QL: SLIGHT — SIGNIFICANT CHANGE UP
BASOPHILS # BLD AUTO: 0.01 K/UL — SIGNIFICANT CHANGE UP (ref 0–0.2)
BASOPHILS # BLD MANUAL: 0.05 K/UL — SIGNIFICANT CHANGE UP (ref 0–0.2)
BASOPHILS NFR BLD AUTO: 0.2 % — SIGNIFICANT CHANGE UP (ref 0–2)
BASOPHILS NFR BLD MANUAL: 0.9 % — SIGNIFICANT CHANGE UP (ref 0–2)
BLD GP AB SCN SERPL QL: SIGNIFICANT CHANGE UP
BUN SERPL-MCNC: 51.8 MG/DL — HIGH (ref 8–20)
CALCIUM SERPL-MCNC: 9.3 MG/DL — SIGNIFICANT CHANGE UP (ref 8.4–10.5)
CHLORIDE SERPL-SCNC: 96 MMOL/L — SIGNIFICANT CHANGE UP (ref 96–108)
CO2 SERPL-SCNC: 21 MMOL/L — LOW (ref 22–29)
CREAT SERPL-MCNC: 9.14 MG/DL — HIGH (ref 0.5–1.3)
DACRYOCYTES BLD QL SMEAR: ABNORMAL
EGFR: 5 ML/MIN/1.73M2 — LOW
EGFR: 5 ML/MIN/1.73M2 — LOW
EOSINOPHIL # BLD AUTO: 0.2 K/UL — SIGNIFICANT CHANGE UP (ref 0–0.5)
EOSINOPHIL # BLD MANUAL: 0.24 K/UL — SIGNIFICANT CHANGE UP (ref 0–0.5)
EOSINOPHIL NFR BLD AUTO: 3.6 % — SIGNIFICANT CHANGE UP (ref 0–6)
EOSINOPHIL NFR BLD MANUAL: 4.4 % — SIGNIFICANT CHANGE UP (ref 0–6)
GIANT PLATELETS BLD QL SMEAR: PRESENT
GLUCOSE SERPL-MCNC: 131 MG/DL — HIGH (ref 70–99)
HCT VFR BLD CALC: 25.2 % — LOW (ref 39–50)
HEPARIN-PF4 AB RESULT: <0.6 U/ML — SIGNIFICANT CHANGE UP (ref 0–0.9)
HGB BLD-MCNC: 8 G/DL — LOW (ref 13–17)
IMM GRANULOCYTES # BLD AUTO: 0.16 K/UL — HIGH (ref 0–0.07)
IMM GRANULOCYTES NFR BLD AUTO: 2.9 % — HIGH (ref 0–0.9)
IMMATURE PLATELET FRACTION #: 3.7 K/UL — LOW (ref 3.9–12.5)
IMMATURE PLATELET FRACTION %: 3.9 % — SIGNIFICANT CHANGE UP (ref 1.6–7.1)
LYMPHOCYTES # BLD AUTO: 1.02 K/UL — SIGNIFICANT CHANGE UP (ref 1–3.3)
LYMPHOCYTES # BLD MANUAL: 0.63 K/UL — LOW (ref 1–3.3)
LYMPHOCYTES NFR BLD AUTO: 18.6 % — SIGNIFICANT CHANGE UP (ref 13–44)
LYMPHOCYTES NFR BLD MANUAL: 11.4 % — LOW (ref 13–44)
MANUAL METAMYELOCYTE #: 0.05 K/UL — HIGH (ref 0–0)
MANUAL NEUTROPHIL BANDS #: 0.05 K/UL — SIGNIFICANT CHANGE UP (ref 0–0.84)
MANUAL REACTIVE LYMPHOCYTES #: 0.1 K/UL — SIGNIFICANT CHANGE UP (ref 0–0.63)
MCHC RBC-ENTMCNC: 31.1 PG — SIGNIFICANT CHANGE UP (ref 27–34)
MCHC RBC-ENTMCNC: 31.7 G/DL — LOW (ref 32–36)
MCV RBC AUTO: 98.1 FL — SIGNIFICANT CHANGE UP (ref 80–100)
METAMYELOCYTES # FLD: 0.9 % — HIGH (ref 0–0)
METAMYELOCYTES NFR BLD: 0.9 % — HIGH (ref 0–0)
METHOD TYPE: SIGNIFICANT CHANGE UP
MONOCYTES # BLD AUTO: 0.62 K/UL — SIGNIFICANT CHANGE UP (ref 0–0.9)
MONOCYTES # BLD MANUAL: 0.38 K/UL — SIGNIFICANT CHANGE UP (ref 0–0.9)
MONOCYTES NFR BLD AUTO: 11.3 % — SIGNIFICANT CHANGE UP (ref 2–14)
MONOCYTES NFR BLD MANUAL: 7 % — SIGNIFICANT CHANGE UP (ref 2–14)
NEUTROPHILS # BLD AUTO: 3.48 K/UL — SIGNIFICANT CHANGE UP (ref 1.8–7.4)
NEUTROPHILS # BLD MANUAL: 3.99 K/UL — SIGNIFICANT CHANGE UP (ref 1.8–7.4)
NEUTROPHILS NFR BLD AUTO: 63.4 % — SIGNIFICANT CHANGE UP (ref 43–77)
NEUTROPHILS NFR BLD MANUAL: 72.7 % — SIGNIFICANT CHANGE UP (ref 43–77)
NEUTS BAND # BLD: 0.9 % — SIGNIFICANT CHANGE UP (ref 0–8)
NEUTS BAND NFR BLD: 0.9 % — SIGNIFICANT CHANGE UP (ref 0–8)
NRBC # BLD AUTO: 0 K/UL — SIGNIFICANT CHANGE UP (ref 0–0)
NRBC # FLD: 0 K/UL — SIGNIFICANT CHANGE UP (ref 0–0)
NRBC BLD AUTO-RTO: 0 /100 WBCS — SIGNIFICANT CHANGE UP (ref 0–0)
PF4 HEPARIN CMPLX AB SER-ACNC: NEGATIVE — SIGNIFICANT CHANGE UP
PLAT MORPH BLD: NORMAL — SIGNIFICANT CHANGE UP
PLATELET # BLD AUTO: 96 K/UL — LOW (ref 150–400)
PMV BLD: 10.9 FL — SIGNIFICANT CHANGE UP (ref 7–13)
POIKILOCYTOSIS BLD QL AUTO: ABNORMAL
POLYCHROMASIA BLD QL SMEAR: ABNORMAL
POTASSIUM SERPL-MCNC: 4.4 MMOL/L — SIGNIFICANT CHANGE UP (ref 3.5–5.3)
POTASSIUM SERPL-SCNC: 4.4 MMOL/L — SIGNIFICANT CHANGE UP (ref 3.5–5.3)
RBC # BLD: 2.57 M/UL — LOW (ref 4.2–5.8)
RBC # FLD: 14.7 % — HIGH (ref 10.3–14.5)
RBC BLD AUTO: ABNORMAL
SODIUM SERPL-SCNC: 134 MMOL/L — LOW (ref 135–145)
VARIANT LYMPHS # BLD: 1.8 % — SIGNIFICANT CHANGE UP (ref 0–6)
VARIANT LYMPHS NFR BLD MANUAL: 1.8 % — SIGNIFICANT CHANGE UP (ref 0–6)
WBC # BLD: 5.49 K/UL — SIGNIFICANT CHANGE UP (ref 3.8–10.5)
WBC # FLD AUTO: 5.49 K/UL — SIGNIFICANT CHANGE UP (ref 3.8–10.5)

## 2025-08-03 PROCEDURE — 99233 SBSQ HOSP IP/OBS HIGH 50: CPT

## 2025-08-03 RX ADMIN — MIDODRINE HYDROCHLORIDE 5 MILLIGRAM(S): 5 TABLET ORAL at 05:47

## 2025-08-03 RX ADMIN — Medication 650 MILLIGRAM(S): at 05:47

## 2025-08-03 RX ADMIN — CEFTRIAXONE 1000 MILLIGRAM(S): 500 INJECTION, POWDER, FOR SOLUTION INTRAMUSCULAR; INTRAVENOUS at 05:47

## 2025-08-03 RX ADMIN — Medication 40 MILLIGRAM(S): at 05:48

## 2025-08-03 RX ADMIN — MIDODRINE HYDROCHLORIDE 5 MILLIGRAM(S): 5 TABLET ORAL at 14:04

## 2025-08-03 RX ADMIN — SEVELAMER HYDROCHLORIDE 800 MILLIGRAM(S): 800 TABLET ORAL at 21:31

## 2025-08-03 RX ADMIN — IRON SUCROSE 110 MILLIGRAM(S): 20 INJECTION, SOLUTION INTRAVENOUS at 14:03

## 2025-08-03 RX ADMIN — MIDODRINE HYDROCHLORIDE 5 MILLIGRAM(S): 5 TABLET ORAL at 20:37

## 2025-08-03 RX ADMIN — Medication 650 MILLIGRAM(S): at 14:05

## 2025-08-03 RX ADMIN — Medication 250 MILLIGRAM(S): at 05:46

## 2025-08-03 RX ADMIN — SEVELAMER HYDROCHLORIDE 800 MILLIGRAM(S): 800 TABLET ORAL at 05:47

## 2025-08-03 RX ADMIN — NEBIVOLOL 2.5 MILLIGRAM(S): 2.5 TABLET ORAL at 05:47

## 2025-08-03 RX ADMIN — TAMSULOSIN HYDROCHLORIDE 0.4 MILLIGRAM(S): 0.4 CAPSULE ORAL at 21:31

## 2025-08-03 RX ADMIN — Medication 1 APPLICATION(S): at 05:48

## 2025-08-03 RX ADMIN — SEVELAMER HYDROCHLORIDE 800 MILLIGRAM(S): 800 TABLET ORAL at 14:05

## 2025-08-03 RX ADMIN — CALCITRIOL 0.25 MICROGRAM(S): 0.5 CAPSULE, GELATIN COATED ORAL at 14:05

## 2025-08-03 RX ADMIN — Medication 1 DOSE(S): at 20:38

## 2025-08-03 RX ADMIN — Medication 650 MILLIGRAM(S): at 21:31

## 2025-08-04 ENCOUNTER — TRANSCRIPTION ENCOUNTER (OUTPATIENT)
Age: 89
End: 2025-08-04

## 2025-08-04 LAB
ALBUMIN SERPL ELPH-MCNC: 3.2 G/DL — LOW (ref 3.3–5.2)
ALP SERPL-CCNC: 82 U/L — SIGNIFICANT CHANGE UP (ref 40–120)
ALT FLD-CCNC: 10 U/L — SIGNIFICANT CHANGE UP
ANION GAP SERPL CALC-SCNC: 15 MMOL/L — SIGNIFICANT CHANGE UP (ref 5–17)
AST SERPL-CCNC: 13 U/L — SIGNIFICANT CHANGE UP
BASOPHILS # BLD AUTO: 0.02 K/UL — SIGNIFICANT CHANGE UP (ref 0–0.2)
BASOPHILS NFR BLD AUTO: 0.3 % — SIGNIFICANT CHANGE UP (ref 0–2)
BILIRUB SERPL-MCNC: 0.5 MG/DL — SIGNIFICANT CHANGE UP (ref 0.4–2)
BUN SERPL-MCNC: 62.6 MG/DL — HIGH (ref 8–20)
CALCIUM SERPL-MCNC: 9.2 MG/DL — SIGNIFICANT CHANGE UP (ref 8.4–10.5)
CHLORIDE SERPL-SCNC: 98 MMOL/L — SIGNIFICANT CHANGE UP (ref 96–108)
CO2 SERPL-SCNC: 21 MMOL/L — LOW (ref 22–29)
CREAT SERPL-MCNC: 10.31 MG/DL — HIGH (ref 0.5–1.3)
CULTURE RESULTS: ABNORMAL
EGFR: 4 ML/MIN/1.73M2 — LOW
EGFR: 4 ML/MIN/1.73M2 — LOW
EOSINOPHIL # BLD AUTO: 0.27 K/UL — SIGNIFICANT CHANGE UP (ref 0–0.5)
EOSINOPHIL NFR BLD AUTO: 4 % — SIGNIFICANT CHANGE UP (ref 0–6)
GLUCOSE SERPL-MCNC: 107 MG/DL — HIGH (ref 70–99)
HCT VFR BLD CALC: 24.9 % — LOW (ref 39–50)
HGB BLD-MCNC: 7.9 G/DL — LOW (ref 13–17)
IMM GRANULOCYTES # BLD AUTO: 0.4 K/UL — HIGH (ref 0–0.07)
IMM GRANULOCYTES NFR BLD AUTO: 6 % — HIGH (ref 0–0.9)
LYMPHOCYTES # BLD AUTO: 1.52 K/UL — SIGNIFICANT CHANGE UP (ref 1–3.3)
LYMPHOCYTES NFR BLD AUTO: 22.6 % — SIGNIFICANT CHANGE UP (ref 13–44)
MAGNESIUM SERPL-MCNC: 2 MG/DL — SIGNIFICANT CHANGE UP (ref 1.6–2.6)
MCHC RBC-ENTMCNC: 30.9 PG — SIGNIFICANT CHANGE UP (ref 27–34)
MCHC RBC-ENTMCNC: 31.7 G/DL — LOW (ref 32–36)
MCV RBC AUTO: 97.3 FL — SIGNIFICANT CHANGE UP (ref 80–100)
MONOCYTES # BLD AUTO: 0.75 K/UL — SIGNIFICANT CHANGE UP (ref 0–0.9)
MONOCYTES NFR BLD AUTO: 11.2 % — SIGNIFICANT CHANGE UP (ref 2–14)
NEUTROPHILS # BLD AUTO: 3.76 K/UL — SIGNIFICANT CHANGE UP (ref 1.8–7.4)
NEUTROPHILS NFR BLD AUTO: 55.9 % — SIGNIFICANT CHANGE UP (ref 43–77)
NRBC # BLD AUTO: 0.02 K/UL — HIGH (ref 0–0)
NRBC # FLD: 0.02 K/UL — HIGH (ref 0–0)
NRBC BLD AUTO-RTO: 0 /100 WBCS — SIGNIFICANT CHANGE UP (ref 0–0)
ORGANISM # SPEC MICROSCOPIC CNT: ABNORMAL
ORGANISM # SPEC MICROSCOPIC CNT: SIGNIFICANT CHANGE UP
PHOSPHATE SERPL-MCNC: 4.5 MG/DL — SIGNIFICANT CHANGE UP (ref 2.4–4.7)
PLATELET # BLD AUTO: 113 K/UL — LOW (ref 150–400)
PMV BLD: 11.4 FL — SIGNIFICANT CHANGE UP (ref 7–13)
POTASSIUM SERPL-MCNC: 4.5 MMOL/L — SIGNIFICANT CHANGE UP (ref 3.5–5.3)
POTASSIUM SERPL-SCNC: 4.5 MMOL/L — SIGNIFICANT CHANGE UP (ref 3.5–5.3)
PROT SERPL-MCNC: 5.9 G/DL — LOW (ref 6.6–8.7)
RBC # BLD: 2.56 M/UL — LOW (ref 4.2–5.8)
RBC # FLD: 14.4 % — SIGNIFICANT CHANGE UP (ref 10.3–14.5)
SODIUM SERPL-SCNC: 134 MMOL/L — LOW (ref 135–145)
SPECIMEN SOURCE: SIGNIFICANT CHANGE UP
WBC # BLD: 6.72 K/UL — SIGNIFICANT CHANGE UP (ref 3.8–10.5)
WBC # FLD AUTO: 6.72 K/UL — SIGNIFICANT CHANGE UP (ref 3.8–10.5)

## 2025-08-04 PROCEDURE — 99233 SBSQ HOSP IP/OBS HIGH 50: CPT

## 2025-08-04 RX ORDER — CALCITRIOL 0.5 UG/1
1 CAPSULE, GELATIN COATED ORAL
Qty: 30 | Refills: 0
Start: 2025-08-04 | End: 2025-09-02

## 2025-08-04 RX ORDER — TAMSULOSIN HYDROCHLORIDE 0.4 MG/1
1 CAPSULE ORAL
Qty: 30 | Refills: 0
Start: 2025-08-04 | End: 2025-09-02

## 2025-08-04 RX ORDER — FERROUS SULFATE 137(45) MG
1 TABLET, EXTENDED RELEASE ORAL
Qty: 30 | Refills: 0
Start: 2025-08-04 | End: 2025-09-02

## 2025-08-04 RX ORDER — CEFPODOXIME PROXETIL 200 MG/1
1 TABLET, FILM COATED ORAL
Qty: 10 | Refills: 0
Start: 2025-08-04 | End: 2025-08-08

## 2025-08-04 RX ORDER — MIDODRINE HYDROCHLORIDE 5 MG/1
1 TABLET ORAL
Qty: 90 | Refills: 0
Start: 2025-08-04 | End: 2025-09-02

## 2025-08-04 RX ADMIN — SEVELAMER HYDROCHLORIDE 800 MILLIGRAM(S): 800 TABLET ORAL at 05:20

## 2025-08-04 RX ADMIN — TAMSULOSIN HYDROCHLORIDE 0.4 MILLIGRAM(S): 0.4 CAPSULE ORAL at 21:34

## 2025-08-04 RX ADMIN — Medication 250 MILLIGRAM(S): at 05:19

## 2025-08-04 RX ADMIN — MIDODRINE HYDROCHLORIDE 5 MILLIGRAM(S): 5 TABLET ORAL at 05:21

## 2025-08-04 RX ADMIN — CEFTRIAXONE 1000 MILLIGRAM(S): 500 INJECTION, POWDER, FOR SOLUTION INTRAMUSCULAR; INTRAVENOUS at 05:20

## 2025-08-04 RX ADMIN — Medication 1 APPLICATION(S): at 05:19

## 2025-08-04 RX ADMIN — SEVELAMER HYDROCHLORIDE 800 MILLIGRAM(S): 800 TABLET ORAL at 13:54

## 2025-08-04 RX ADMIN — Medication 40 MILLIGRAM(S): at 05:21

## 2025-08-04 RX ADMIN — CALCITRIOL 0.25 MICROGRAM(S): 0.5 CAPSULE, GELATIN COATED ORAL at 13:54

## 2025-08-04 RX ADMIN — SEVELAMER HYDROCHLORIDE 800 MILLIGRAM(S): 800 TABLET ORAL at 21:35

## 2025-08-04 RX ADMIN — MIDODRINE HYDROCHLORIDE 5 MILLIGRAM(S): 5 TABLET ORAL at 11:01

## 2025-08-04 RX ADMIN — Medication 650 MILLIGRAM(S): at 21:34

## 2025-08-04 RX ADMIN — Medication 1 DOSE(S): at 08:26

## 2025-08-04 RX ADMIN — MIDODRINE HYDROCHLORIDE 5 MILLIGRAM(S): 5 TABLET ORAL at 17:47

## 2025-08-04 RX ADMIN — Medication 650 MILLIGRAM(S): at 05:20

## 2025-08-04 RX ADMIN — Medication 650 MILLIGRAM(S): at 13:54

## 2025-08-05 VITALS
HEART RATE: 61 BPM | TEMPERATURE: 98 F | SYSTOLIC BLOOD PRESSURE: 132 MMHG | RESPIRATION RATE: 18 BRPM | DIASTOLIC BLOOD PRESSURE: 55 MMHG | OXYGEN SATURATION: 94 %

## 2025-08-05 LAB
ALBUMIN SERPL ELPH-MCNC: 3.6 G/DL — SIGNIFICANT CHANGE UP (ref 3.3–5.2)
ALP SERPL-CCNC: 94 U/L — SIGNIFICANT CHANGE UP (ref 40–120)
ALT FLD-CCNC: 12 U/L — SIGNIFICANT CHANGE UP
ANION GAP SERPL CALC-SCNC: 17 MMOL/L — SIGNIFICANT CHANGE UP (ref 5–17)
AST SERPL-CCNC: 19 U/L — SIGNIFICANT CHANGE UP
BASOPHILS # BLD AUTO: 0.04 K/UL — SIGNIFICANT CHANGE UP (ref 0–0.2)
BASOPHILS NFR BLD AUTO: 0.5 % — SIGNIFICANT CHANGE UP (ref 0–2)
BILIRUB SERPL-MCNC: 0.7 MG/DL — SIGNIFICANT CHANGE UP (ref 0.4–2)
BUN SERPL-MCNC: 42.2 MG/DL — HIGH (ref 8–20)
CALCIUM SERPL-MCNC: 9.8 MG/DL — SIGNIFICANT CHANGE UP (ref 8.4–10.5)
CHLORIDE SERPL-SCNC: 97 MMOL/L — SIGNIFICANT CHANGE UP (ref 96–108)
CO2 SERPL-SCNC: 24 MMOL/L — SIGNIFICANT CHANGE UP (ref 22–29)
CREAT SERPL-MCNC: 7.5 MG/DL — HIGH (ref 0.5–1.3)
CULTURE RESULTS: SIGNIFICANT CHANGE UP
CULTURE RESULTS: SIGNIFICANT CHANGE UP
EGFR: 6 ML/MIN/1.73M2 — LOW
EGFR: 6 ML/MIN/1.73M2 — LOW
EOSINOPHIL # BLD AUTO: 0.23 K/UL — SIGNIFICANT CHANGE UP (ref 0–0.5)
EOSINOPHIL NFR BLD AUTO: 2.8 % — SIGNIFICANT CHANGE UP (ref 0–6)
GLUCOSE SERPL-MCNC: 113 MG/DL — HIGH (ref 70–99)
HCT VFR BLD CALC: 26.9 % — LOW (ref 39–50)
HGB BLD-MCNC: 8.6 G/DL — LOW (ref 13–17)
IMM GRANULOCYTES # BLD AUTO: 0.65 K/UL — HIGH (ref 0–0.07)
IMM GRANULOCYTES NFR BLD AUTO: 7.8 % — HIGH (ref 0–0.9)
LYMPHOCYTES # BLD AUTO: 1.36 K/UL — SIGNIFICANT CHANGE UP (ref 1–3.3)
LYMPHOCYTES NFR BLD AUTO: 16.4 % — SIGNIFICANT CHANGE UP (ref 13–44)
MAGNESIUM SERPL-MCNC: 1.9 MG/DL — SIGNIFICANT CHANGE UP (ref 1.6–2.6)
MCHC RBC-ENTMCNC: 30.5 PG — SIGNIFICANT CHANGE UP (ref 27–34)
MCHC RBC-ENTMCNC: 32 G/DL — SIGNIFICANT CHANGE UP (ref 32–36)
MCV RBC AUTO: 95.4 FL — SIGNIFICANT CHANGE UP (ref 80–100)
MONOCYTES # BLD AUTO: 0.7 K/UL — SIGNIFICANT CHANGE UP (ref 0–0.9)
MONOCYTES NFR BLD AUTO: 8.4 % — SIGNIFICANT CHANGE UP (ref 2–14)
NEUTROPHILS # BLD AUTO: 5.32 K/UL — SIGNIFICANT CHANGE UP (ref 1.8–7.4)
NEUTROPHILS NFR BLD AUTO: 64.1 % — SIGNIFICANT CHANGE UP (ref 43–77)
NRBC # BLD AUTO: 0 K/UL — SIGNIFICANT CHANGE UP (ref 0–0)
NRBC # FLD: 0 K/UL — SIGNIFICANT CHANGE UP (ref 0–0)
NRBC BLD AUTO-RTO: 0 /100 WBCS — SIGNIFICANT CHANGE UP (ref 0–0)
PHOSPHATE SERPL-MCNC: 3.8 MG/DL — SIGNIFICANT CHANGE UP (ref 2.4–4.7)
PLATELET # BLD AUTO: 131 K/UL — LOW (ref 150–400)
PMV BLD: 11.2 FL — SIGNIFICANT CHANGE UP (ref 7–13)
POTASSIUM SERPL-MCNC: 4.4 MMOL/L — SIGNIFICANT CHANGE UP (ref 3.5–5.3)
POTASSIUM SERPL-SCNC: 4.4 MMOL/L — SIGNIFICANT CHANGE UP (ref 3.5–5.3)
PROT SERPL-MCNC: 6 G/DL — LOW (ref 6.6–8.7)
RBC # BLD: 2.82 M/UL — LOW (ref 4.2–5.8)
RBC # FLD: 14.2 % — SIGNIFICANT CHANGE UP (ref 10.3–14.5)
SODIUM SERPL-SCNC: 138 MMOL/L — SIGNIFICANT CHANGE UP (ref 135–145)
SPECIMEN SOURCE: SIGNIFICANT CHANGE UP
SPECIMEN SOURCE: SIGNIFICANT CHANGE UP
WBC # BLD: 8.3 K/UL — SIGNIFICANT CHANGE UP (ref 3.8–10.5)
WBC # FLD AUTO: 8.3 K/UL — SIGNIFICANT CHANGE UP (ref 3.8–10.5)

## 2025-08-05 PROCEDURE — 82310 ASSAY OF CALCIUM: CPT

## 2025-08-05 PROCEDURE — 84436 ASSAY OF TOTAL THYROXINE: CPT

## 2025-08-05 PROCEDURE — 82306 VITAMIN D 25 HYDROXY: CPT

## 2025-08-05 PROCEDURE — 82728 ASSAY OF FERRITIN: CPT

## 2025-08-05 PROCEDURE — 81001 URINALYSIS AUTO W/SCOPE: CPT

## 2025-08-05 PROCEDURE — 84466 ASSAY OF TRANSFERRIN: CPT

## 2025-08-05 PROCEDURE — 99261: CPT

## 2025-08-05 PROCEDURE — 82947 ASSAY GLUCOSE BLOOD QUANT: CPT

## 2025-08-05 PROCEDURE — 84443 ASSAY THYROID STIM HORMONE: CPT

## 2025-08-05 PROCEDURE — 83735 ASSAY OF MAGNESIUM: CPT

## 2025-08-05 PROCEDURE — 84295 ASSAY OF SERUM SODIUM: CPT

## 2025-08-05 PROCEDURE — 96374 THER/PROPH/DIAG INJ IV PUSH: CPT

## 2025-08-05 PROCEDURE — 85014 HEMATOCRIT: CPT

## 2025-08-05 PROCEDURE — 83605 ASSAY OF LACTIC ACID: CPT

## 2025-08-05 PROCEDURE — 85018 HEMOGLOBIN: CPT

## 2025-08-05 PROCEDURE — 80048 BASIC METABOLIC PNL TOTAL CA: CPT

## 2025-08-05 PROCEDURE — 85025 COMPLETE CBC W/AUTO DIFF WBC: CPT

## 2025-08-05 PROCEDURE — 86901 BLOOD TYPING SEROLOGIC RH(D): CPT

## 2025-08-05 PROCEDURE — 86900 BLOOD TYPING SEROLOGIC ABO: CPT

## 2025-08-05 PROCEDURE — 87077 CULTURE AEROBIC IDENTIFY: CPT

## 2025-08-05 PROCEDURE — 84439 ASSAY OF FREE THYROXINE: CPT

## 2025-08-05 PROCEDURE — 87340 HEPATITIS B SURFACE AG IA: CPT

## 2025-08-05 PROCEDURE — 86705 HEP B CORE ANTIBODY IGM: CPT

## 2025-08-05 PROCEDURE — 83880 ASSAY OF NATRIURETIC PEPTIDE: CPT

## 2025-08-05 PROCEDURE — 82803 BLOOD GASES ANY COMBINATION: CPT

## 2025-08-05 PROCEDURE — 84480 ASSAY TRIIODOTHYRONINE (T3): CPT

## 2025-08-05 PROCEDURE — 86803 HEPATITIS C AB TEST: CPT

## 2025-08-05 PROCEDURE — 82330 ASSAY OF CALCIUM: CPT

## 2025-08-05 PROCEDURE — 87640 STAPH A DNA AMP PROBE: CPT

## 2025-08-05 PROCEDURE — 87040 BLOOD CULTURE FOR BACTERIA: CPT

## 2025-08-05 PROCEDURE — 85730 THROMBOPLASTIN TIME PARTIAL: CPT

## 2025-08-05 PROCEDURE — 86850 RBC ANTIBODY SCREEN: CPT

## 2025-08-05 PROCEDURE — 96375 TX/PRO/DX INJ NEW DRUG ADDON: CPT

## 2025-08-05 PROCEDURE — 85027 COMPLETE CBC AUTOMATED: CPT

## 2025-08-05 PROCEDURE — 85610 PROTHROMBIN TIME: CPT

## 2025-08-05 PROCEDURE — P9047: CPT

## 2025-08-05 PROCEDURE — 86704 HEP B CORE ANTIBODY TOTAL: CPT

## 2025-08-05 PROCEDURE — 87637 SARSCOV2&INF A&B&RSV AMP PRB: CPT

## 2025-08-05 PROCEDURE — 93990 DOPPLER FLOW TESTING: CPT

## 2025-08-05 PROCEDURE — 82272 OCCULT BLD FECES 1-3 TESTS: CPT

## 2025-08-05 PROCEDURE — 87086 URINE CULTURE/COLONY COUNT: CPT

## 2025-08-05 PROCEDURE — 86022 PLATELET ANTIBODIES: CPT

## 2025-08-05 PROCEDURE — 84484 ASSAY OF TROPONIN QUANT: CPT

## 2025-08-05 PROCEDURE — 93005 ELECTROCARDIOGRAM TRACING: CPT

## 2025-08-05 PROCEDURE — 83550 IRON BINDING TEST: CPT

## 2025-08-05 PROCEDURE — 71045 X-RAY EXAM CHEST 1 VIEW: CPT

## 2025-08-05 PROCEDURE — 36415 COLL VENOUS BLD VENIPUNCTURE: CPT

## 2025-08-05 PROCEDURE — 84132 ASSAY OF SERUM POTASSIUM: CPT

## 2025-08-05 PROCEDURE — 84100 ASSAY OF PHOSPHORUS: CPT

## 2025-08-05 PROCEDURE — 87186 SC STD MICRODIL/AGAR DIL: CPT

## 2025-08-05 PROCEDURE — 93306 TTE W/DOPPLER COMPLETE: CPT

## 2025-08-05 PROCEDURE — 94640 AIRWAY INHALATION TREATMENT: CPT

## 2025-08-05 PROCEDURE — 83970 ASSAY OF PARATHORMONE: CPT

## 2025-08-05 PROCEDURE — 99285 EMERGENCY DEPT VISIT HI MDM: CPT | Mod: 25

## 2025-08-05 PROCEDURE — 87641 MR-STAPH DNA AMP PROBE: CPT

## 2025-08-05 PROCEDURE — 82962 GLUCOSE BLOOD TEST: CPT

## 2025-08-05 PROCEDURE — 82435 ASSAY OF BLOOD CHLORIDE: CPT

## 2025-08-05 PROCEDURE — 83540 ASSAY OF IRON: CPT

## 2025-08-05 PROCEDURE — 86706 HEP B SURFACE ANTIBODY: CPT

## 2025-08-05 PROCEDURE — 80053 COMPREHEN METABOLIC PANEL: CPT

## 2025-08-05 PROCEDURE — 99239 HOSP IP/OBS DSCHRG MGMT >30: CPT

## 2025-08-05 RX ADMIN — SEVELAMER HYDROCHLORIDE 800 MILLIGRAM(S): 800 TABLET ORAL at 10:01

## 2025-08-05 RX ADMIN — Medication 650 MILLIGRAM(S): at 05:49

## 2025-08-05 RX ADMIN — Medication 250 MILLIGRAM(S): at 05:37

## 2025-08-05 RX ADMIN — MIDODRINE HYDROCHLORIDE 5 MILLIGRAM(S): 5 TABLET ORAL at 12:05

## 2025-08-05 RX ADMIN — NEBIVOLOL 2.5 MILLIGRAM(S): 2.5 TABLET ORAL at 05:48

## 2025-08-05 RX ADMIN — Medication 1 APPLICATION(S): at 10:01

## 2025-08-05 RX ADMIN — MIDODRINE HYDROCHLORIDE 5 MILLIGRAM(S): 5 TABLET ORAL at 05:48

## 2025-08-05 RX ADMIN — Medication 40 MILLIGRAM(S): at 05:49

## 2025-08-07 ENCOUNTER — APPOINTMENT (OUTPATIENT)
Dept: UROLOGY | Facility: CLINIC | Age: 89
End: 2025-08-07
Payer: COMMERCIAL

## 2025-08-07 DIAGNOSIS — R33.9 RETENTION OF URINE, UNSPECIFIED: ICD-10-CM

## 2025-08-07 PROCEDURE — 51702 INSERT TEMP BLADDER CATH: CPT

## 2025-08-08 ENCOUNTER — TRANSCRIPTION ENCOUNTER (OUTPATIENT)
Age: 89
End: 2025-08-08

## 2025-08-14 ENCOUNTER — APPOINTMENT (OUTPATIENT)
Dept: INTERNAL MEDICINE | Facility: CLINIC | Age: 89
End: 2025-08-14
Payer: MEDICARE

## 2025-08-14 VITALS
SYSTOLIC BLOOD PRESSURE: 112 MMHG | WEIGHT: 149 LBS | HEIGHT: 64 IN | BODY MASS INDEX: 25.44 KG/M2 | DIASTOLIC BLOOD PRESSURE: 45 MMHG

## 2025-08-14 DIAGNOSIS — N18.9 CHRONIC KIDNEY DISEASE, UNSPECIFIED: ICD-10-CM

## 2025-08-14 DIAGNOSIS — D64.9 ANEMIA, UNSPECIFIED: ICD-10-CM

## 2025-08-14 DIAGNOSIS — J44.9 CHRONIC OBSTRUCTIVE PULMONARY DISEASE, UNSPECIFIED: ICD-10-CM

## 2025-08-14 DIAGNOSIS — Z09 ENCOUNTER FOR FOLLOW-UP EXAMINATION AFTER COMPLETED TREATMENT FOR CONDITIONS OTHER THAN MALIGNANT NEOPLASM: ICD-10-CM

## 2025-08-14 DIAGNOSIS — N18.6 END STAGE RENAL DISEASE: ICD-10-CM

## 2025-08-14 PROCEDURE — 99215 OFFICE O/P EST HI 40 MIN: CPT

## 2025-08-14 PROCEDURE — G2211 COMPLEX E/M VISIT ADD ON: CPT

## 2025-08-14 RX ORDER — CALCITRIOL 0.25 UG/1
0.25 CAPSULE, LIQUID FILLED ORAL DAILY
Qty: 90 | Refills: 3 | Status: ACTIVE | COMMUNITY
Start: 2025-08-14 | End: 1900-01-01

## 2025-08-14 RX ORDER — FERROUS SULFATE TAB 325 MG (65 MG ELEMENTAL FE) 325 (65 FE) MG
325 (65 FE) TAB ORAL DAILY
Qty: 90 | Refills: 3 | Status: ACTIVE | COMMUNITY
Start: 2025-08-14 | End: 1900-01-01

## 2025-08-18 ENCOUNTER — TRANSCRIPTION ENCOUNTER (OUTPATIENT)
Age: 89
End: 2025-08-18

## 2025-08-26 ENCOUNTER — APPOINTMENT (OUTPATIENT)
Dept: PULMONOLOGY | Facility: CLINIC | Age: 89
End: 2025-08-26

## 2025-09-08 ENCOUNTER — APPOINTMENT (OUTPATIENT)
Dept: UROLOGY | Facility: CLINIC | Age: 89
End: 2025-09-08

## 2025-09-09 ENCOUNTER — APPOINTMENT (OUTPATIENT)
Dept: UROLOGY | Facility: CLINIC | Age: 89
End: 2025-09-09

## 2025-09-09 ENCOUNTER — NON-APPOINTMENT (OUTPATIENT)
Age: 89
End: 2025-09-09

## 2025-09-15 ENCOUNTER — APPOINTMENT (OUTPATIENT)
Dept: UROLOGY | Facility: CLINIC | Age: 89
End: 2025-09-15

## 2025-09-15 ENCOUNTER — APPOINTMENT (OUTPATIENT)
Dept: DERMATOLOGY | Facility: CLINIC | Age: 89
End: 2025-09-15

## (undated) DEVICE — SUT HEWSON RETRIEVER

## (undated) DEVICE — SUT ETHIBOND 5 4-30" CCS

## (undated) DEVICE — WARMING BLANKET UPPER ADULT

## (undated) DEVICE — SUT MONOCRYL 3-0 27" PS-2 UNDYED

## (undated) DEVICE — SOL IRR POUR NS 0.9% 1000ML

## (undated) DEVICE — DRAPE 1/2 SHEET 40X57"

## (undated) DEVICE — DRAPE 3/4 SHEET 52X76"

## (undated) DEVICE — SUT VICRYL 2-0 27" CT-2 UNDYED

## (undated) DEVICE — ZIMMER PULSAVAC PLUS FAN KIT

## (undated) DEVICE — SOL IRR POUR H2O 1500ML

## (undated) DEVICE — GLV 8 PROTEXIS (WHITE)

## (undated) DEVICE — DRAPE XL SHEET 77X98"

## (undated) DEVICE — NDL HYPO SAFE 20G X 1.5" (YELLOW)

## (undated) DEVICE — DRAPE U LONG 47X70"

## (undated) DEVICE — SYR LUER LOK 50CC

## (undated) DEVICE — DRSG DERMABOND PRINEO 22CM

## (undated) DEVICE — ZIMMER CEMENT MIXING SYSTEM COMPACT VACUUM

## (undated) DEVICE — SAW BLADE ZIMMER RECIPROCATING SINGLE SIDED 10X70X1.19MM

## (undated) DEVICE — ELCTR AQUAMANTYS BIPOLAR SEALER 6.0

## (undated) DEVICE — DRAPE SPLIT SHEET 77" X 108"

## (undated) DEVICE — SOL IRR POUR NS 0.9% 500ML

## (undated) DEVICE — DRILL BIT BRASSELER TWIST 2.4MM 3/32 X 5"

## (undated) DEVICE — DRSG TEGADERM 6"X8"

## (undated) DEVICE — DRAPE HIP W POUCHES 87X115X134"

## (undated) DEVICE — PACK TOTAL HIP

## (undated) DEVICE — SOL BETADINE POUCH 0.75OZ STERILE

## (undated) DEVICE — ELCTR STRYKER NEPTUNE SMOKE EVACUATION PENCIL (GREEN)

## (undated) DEVICE — SUT VICRYL 0 18" CT-1 UNDYED (POP-OFF)